# Patient Record
Sex: MALE | Race: WHITE | Employment: OTHER | URBAN - METROPOLITAN AREA
[De-identification: names, ages, dates, MRNs, and addresses within clinical notes are randomized per-mention and may not be internally consistent; named-entity substitution may affect disease eponyms.]

---

## 2017-02-11 PROBLEM — E86.0 DEHYDRATION: Status: ACTIVE | Noted: 2017-02-11

## 2017-02-11 PROBLEM — R53.1 GENERAL WEAKNESS: Status: ACTIVE | Noted: 2017-02-11

## 2017-09-27 PROBLEM — C34.90 LUNG CANCER (HCC): Status: ACTIVE | Noted: 2017-09-27

## 2018-01-16 PROBLEM — I70.245 ATHEROSCLEROSIS OF NATIVE ARTERIES OF LEFT LEG WITH ULCERATION OF OTHER PART OF FOOT (HCC): Status: ACTIVE | Noted: 2018-01-16

## 2018-03-21 ENCOUNTER — HOSPITAL ENCOUNTER (OUTPATIENT)
Dept: INTERVENTIONAL RADIOLOGY/VASCULAR | Age: 83
Discharge: HOME | End: 2018-03-23
Payer: MEDICARE

## 2018-03-21 DIAGNOSIS — I73.9 PERIPHERAL VASCULAR DISEASE (HCC): ICD-10-CM

## 2018-03-21 PROCEDURE — 93923 UPR/LXTR ART STDY 3+ LVLS: CPT

## 2018-03-27 ENCOUNTER — OFFICE VISIT (OUTPATIENT)
Dept: VASCULAR SURGERY | Age: 83
End: 2018-03-27
Payer: MEDICARE

## 2018-03-27 DIAGNOSIS — I70.245 ATHEROSCLEROSIS OF NATIVE ARTERIES OF LEFT LEG WITH ULCERATION OF OTHER PART OF FOOT (HCC): Primary | ICD-10-CM

## 2018-03-27 PROCEDURE — 4040F PNEUMOC VAC/ADMIN/RCVD: CPT | Performed by: SURGERY

## 2018-03-27 PROCEDURE — 1123F ACP DISCUSS/DSCN MKR DOCD: CPT | Performed by: SURGERY

## 2018-03-27 PROCEDURE — G8427 DOCREV CUR MEDS BY ELIG CLIN: HCPCS | Performed by: SURGERY

## 2018-03-27 PROCEDURE — 99214 OFFICE O/P EST MOD 30 MIN: CPT | Performed by: SURGERY

## 2018-03-27 PROCEDURE — G8420 CALC BMI NORM PARAMETERS: HCPCS | Performed by: SURGERY

## 2018-03-27 PROCEDURE — 1036F TOBACCO NON-USER: CPT | Performed by: SURGERY

## 2018-03-27 PROCEDURE — G8599 NO ASA/ANTIPLAT THER USE RNG: HCPCS | Performed by: SURGERY

## 2018-03-27 PROCEDURE — G8484 FLU IMMUNIZE NO ADMIN: HCPCS | Performed by: SURGERY

## 2018-03-27 RX ORDER — CILOSTAZOL 100 MG/1
100 TABLET ORAL 2 TIMES DAILY
Qty: 60 TABLET | Refills: 5 | Status: SHIPPED | OUTPATIENT
Start: 2018-03-27 | End: 2019-01-01

## 2018-03-27 RX ORDER — MIRTAZAPINE 7.5 MG/1
30 TABLET, FILM COATED ORAL NIGHTLY
Status: ON HOLD | COMMUNITY
End: 2019-01-01 | Stop reason: HOSPADM

## 2018-03-27 NOTE — PROGRESS NOTES
Vascular Surgery Outpatient Progress Note      Chief Complaint   Patient presents with    Circulatory Problem     new foot ulcers (R) foot from wearing an air cast, little toe was amputated       HISTORY OF PRESENT ILLNESS:                The patient is a 80 y.o. male who returns for follow-up evaluation of peripheral vascular disease. He had amputation of his fifth toe but developed a new wound on the lateral aspect of his foot. He had repeat arterial studies that suggested significant disease. He denies any fevers or chills. He states 3 out of 10 pain that exacerbates with walking. Past Medical History:        Diagnosis Date    Arthritis     Cancer ([facility] [address]     lung    Carotid artery stenosis     Dementia     HIGH CHOLESTEROL     treated with medication    Hypertension     Parkinson disease ([facility] [address]     Peripheral vascular disease ([facility] [address]      Past Surgical History:        Procedure Laterality Date    BRONCHOSCOPY      CARDIAC SURGERY      triple bypass    COLONOSCOPY  2011    Dr. Kayleen Petty at [address] [place] [address]      rt hip arthroplasty    HERNIA REPAIR      [facility]     Current Medications:   Prior to Admission medications    Medication Sig Start Date End Date Taking?  Authorizing Provider   mirtazapine (REMERON) 7.5 MG tablet Take 7.5 mg by mouth nightly   Yes Historical Provider, MD   cilostazol (PLETAL) 100 MG tablet Take 1 tablet by mouth 2 times daily 3/27/18  Yes Leydi Zuñiga MD   gabapentin (NEURONTIN) 100 MG capsule Take 100 mg by mouth 3 times daily   Yes Historical Provider, MD   acetaminophen (TYLENOL) 325 MG tablet Take 650 mg by mouth every 6 hours as needed for Pain   Yes Historical Provider, MD   Memantine HCl (NAMENDA PO) Take by mouth   Yes Historical Provider, MD   Donepezil HCl (ARICEPT PO) Take by mouth   Yes Historical Provider, MD   propranolol (INDERAL) 20 MG tablet Take 20 mg by mouth 2 times daily   Yes Historical Provider, MD   omeprazole (PRILOSEC) 40 MG delayed release capsule Take 40 mg by mouth daily   Yes Historical Provider, MD   CARBIDOPA PO Take by mouth 12.5/50 one three times per day   Yes Historical Provider, MD   hyoscyamine (LEVBID) 0.375 MG CR tablet Take 0.375 mg by mouth every 12 hours as needed for Cramping   Yes Historical Provider, MD   Vitamin D (CHOLECALCIFEROL) 1000 UNITS CAPS capsule Take 1,000 Units by mouth daily. Yes Historical Provider, MD   digoxin (LANOXIN) 0.25 MG tablet Take 250 mcg by mouth daily. Yes Historical Provider, MD   metoprolol (LOPRESSOR) 50 MG tablet Take 50 mg by mouth 2 times daily. Yes Historical Provider, MD   pravastatin (PRAVACHOL) 40 MG tablet Take 40 mg by mouth daily. Yes Historical Provider, MD   aspirin 81 MG EC tablet Take 1 tablet by mouth daily. 9/16/11  Yes Basilio Otoole MD   doxazosin (CARDURA) 1 MG tablet   Take 2 mg by mouth daily    Yes Historical Provider, MD   lisinopril (PRINIVIL;ZESTRIL) 20 MG tablet Take 20 mg by mouth daily. Yes Historical Provider, MD   Multiple Vitamins-Minerals (TREVOR MULTIVITAMIN FOR MEN PO) Take  by mouth. Yes Historical Provider, MD   cephALEXin (KEFLEX) 500 MG capsule Take 500 mg by mouth 4 times daily    Historical Provider, MD   MECLIZINE HCL PO Take by mouth    Historical Provider, MD   Naproxen Sodium (ALEVE PO) Take by mouth    Historical Provider, MD   warfarin (COUMADIN) 5 MG tablet 5mg daily 9/16/11 9/15/12  Basilio Otoole MD     Allergies:  Fexofenadine-pseudoephed er    Social History     Social History    Marital status:      Spouse name: N/A    Number of children: N/A    Years of education: N/A     Occupational History    Not on file.      Social History Main Topics    Smoking status: Former Smoker     Packs/day: 1.25     Types: Cigarettes     Quit date: 9/6/1985    Smokeless tobacco: Never Used    Alcohol use No    Drug use: No    Sexual activity: Not Currently     Other Topics Concern    Not on file     Social History Narrative    Lives in [facility] with 2 daughters - retired. Family History   Problem Relation Age of Onset    Cancer Mother      [address]  unconfirmed    Cancer Other      sister - skin       REVIEW OF SYSTEMS (New symptoms):    Eyes:      Blurred vision:  No [x]/Yes []               Diplopia:   No [x]/Yes []               Vision loss:       No [x]/Yes []   Ears, nose, throat:             Hearing loss:    No [x]/Yes []      Vertigo:   No [x]/Yes []                       Swallowing problem:  No [x]/Yes []               Nose bleeds:   No [x]/Yes []      Voice hoarseness:  No [x]/Yes []  Respiratory:             Cough:   No [x]/Yes []      Pleuritic chest pain:  No [x]/Yes []                        Dyspnea:   No [x]/Yes []      Wheezing:   No [x]/Yes []  Cardiovascular:             Angina:   No [x]/Yes []      Palpitations:   No [x]/Yes []          Claudication:    No [x]/Yes []      Leg swelling:   No [x]/Yes []  Gastrointestinal:             Nausea or vomiting:  No [x]/Yes []               Abdominal pain:  No [x]/Yes []                     Intestinal bleeding: No [x]/Yes []  Musculoskeletal:             Leg pain:   No []/Yes [x]      Back pain:   No [x]/Yes []                    Weakness:   No [x]/Yes []  Neurologic:             Numbness:   No [x]/Yes []      Paralysis:   No [x]/Yes []                       Headaches:   No [x]/Yes []  Hematologic, lymphatic:   Anemia:   No [x]/Yes []              Bleeding or bruising:  No [x]/Yes []              Fevers or chills: No [x]/Yes []  Endocrine:             Temp intolerance:   No [x]/Yes []                       Polydipsia, polyuria:  No [x]/Yes []  Skin:              Rash:    No [x]/Yes []      Ulcers:   No []/Yes [x]              Abnorm pigment: No [x]/Yes []  :              Frequency/urgency:  No [x]/Yes []      Hematuria:    No [x]/Yes []                      Incontinence:    No [x]/Yes []    PHYSICAL EXAM:  There were no vitals filed for this visit. General Appearance: alert and oriented to person, place and time, well developed and well- nourished, in no acute distress  Skin: warm and dry, no rash or erythema  Head: normocephalic and atraumatic  Eyes: extraocular eye movements intact, conjunctivae normal  ENT: external ear and ear canal normal bilaterally, nose without deformity  Pulmonary/Chest: clear to auscultation bilaterally- no wheezes, rales or rhonchi, normal air movement, no respiratory distress  Cardiovascular: normal rate, regular rhythm, normal S1 and S2, no murmurs, no carotid bruits  Abdomen: soft, non-tender, non-distended, normal bowel sounds, no masses or organomegaly  Musculoskeletal: normal range of motion, no joint swelling, deformity or tenderness  Neurologic: no cranial nerve deficit, gait, coordination and speech normal  Extremities: no leg edema bilaterally    PULSE EXAM      Right      Left   Brachial     Radial     Femoral     Popliteal     Dorsalis Pedis     Posterior Tibial     (3=normal, 2=diminished, 1=barely palpable, 4=widened)    RADIOLOGY:     IMPRESSION/RECOMMENDATIONS:      Problem List Items Addressed This Visit     Atherosclerosis of native arteries of left leg with ulceration of other part of foot ([address] - Primary          I reviewed with the patient and his daughter that I do recommend proceeding with an arteriogram to better evaluate the circulation and potentially perform intervention. They understand and agree. The daughter also mentioned that he was on Pletal previously but it was stopped when he was experiencing gastritis. She states that he was taking significant ibuprofen during the time for back pain and spinal stenosis and the gastritis resolved after he stopped taking ibuprofen. I will restart the Pletal and asked him to notify me of any side effects. No Follow-up on file.

## 2018-03-28 PROBLEM — I70.235 ATHEROSCLEROSIS OF NATIVE ARTERIES OF RIGHT LEG WITH ULCERATION OF OTHER PART OF FOOT (HCC): Chronic | Status: ACTIVE | Noted: 2018-03-28

## 2018-04-04 ENCOUNTER — HOSPITAL ENCOUNTER (OUTPATIENT)
Dept: CARDIAC CATH/INVASIVE PROCEDURES | Age: 83
Discharge: HOME | End: 2018-04-04
Payer: MEDICARE

## 2018-04-04 ENCOUNTER — TELEPHONE (OUTPATIENT)
Dept: VASCULAR SURGERY | Age: 83
End: 2018-04-04

## 2018-04-04 VITALS
RESPIRATION RATE: 16 BRPM | TEMPERATURE: 98.5 F | DIASTOLIC BLOOD PRESSURE: 75 MMHG | HEART RATE: 87 BPM | BODY MASS INDEX: 20 KG/M2 | HEIGHT: 68 IN | WEIGHT: 132 LBS | SYSTOLIC BLOOD PRESSURE: 148 MMHG

## 2018-04-04 DIAGNOSIS — I70.235 ATHEROSCLEROSIS OF NATIVE ARTERIES OF RIGHT LEG WITH ULCERATION OF OTHER PART OF FOOT (HCC): Chronic | ICD-10-CM

## 2018-04-04 LAB
ABO/RH: NORMAL
ANION GAP SERPL CALCULATED.3IONS-SCNC: 12 MMOL/L (ref 7–16)
ANTIBODY SCREEN: NORMAL
BUN BLDV-MCNC: 27 MG/DL (ref 8–23)
CALCIUM SERPL-MCNC: 9.7 MG/DL (ref 8.6–10.2)
CHLORIDE BLD-SCNC: 100 MMOL/L (ref 98–107)
CO2: 30 MMOL/L (ref 22–29)
CREAT SERPL-MCNC: 1.5 MG/DL (ref 0.7–1.2)
GFR AFRICAN AMERICAN: 54
GFR NON-AFRICAN AMERICAN: 45 ML/MIN/1.73
GLUCOSE BLD-MCNC: 102 MG/DL (ref 74–109)
HCT VFR BLD CALC: 29.1 % (ref 37–54)
HEMOGLOBIN: 9.6 G/DL (ref 12.5–16.5)
MCH RBC QN AUTO: 33.3 PG (ref 26–35)
MCHC RBC AUTO-ENTMCNC: 33 % (ref 32–34.5)
MCV RBC AUTO: 101 FL (ref 80–99.9)
PDW BLD-RTO: 13.6 FL (ref 11.5–15)
PLATELET # BLD: 163 E9/L (ref 130–450)
PMV BLD AUTO: 10.3 FL (ref 7–12)
POTASSIUM SERPL-SCNC: 4.5 MMOL/L (ref 3.5–5)
RBC # BLD: 2.88 E12/L (ref 3.8–5.8)
SODIUM BLD-SCNC: 142 MMOL/L (ref 132–146)
WBC # BLD: 6.7 E9/L (ref 4.5–11.5)

## 2018-04-04 PROCEDURE — C1725 CATH, TRANSLUMIN NON-LASER: HCPCS

## 2018-04-04 PROCEDURE — 86900 BLOOD TYPING SEROLOGIC ABO: CPT

## 2018-04-04 PROCEDURE — 2500000003 HC RX 250 WO HCPCS

## 2018-04-04 PROCEDURE — C1769 GUIDE WIRE: HCPCS

## 2018-04-04 PROCEDURE — 6360000002 HC RX W HCPCS

## 2018-04-04 PROCEDURE — C1894 INTRO/SHEATH, NON-LASER: HCPCS

## 2018-04-04 PROCEDURE — C1760 CLOSURE DEV, VASC: HCPCS

## 2018-04-04 PROCEDURE — 75710 ARTERY X-RAYS ARM/LEG: CPT | Performed by: SURGERY

## 2018-04-04 PROCEDURE — G0269 OCCLUSIVE DEVICE IN VEIN ART: HCPCS | Performed by: SURGERY

## 2018-04-04 PROCEDURE — 80048 BASIC METABOLIC PNL TOTAL CA: CPT

## 2018-04-04 PROCEDURE — 86850 RBC ANTIBODY SCREEN: CPT

## 2018-04-04 PROCEDURE — 86901 BLOOD TYPING SEROLOGIC RH(D): CPT

## 2018-04-04 PROCEDURE — 85027 COMPLETE CBC AUTOMATED: CPT

## 2018-04-04 PROCEDURE — 36415 COLL VENOUS BLD VENIPUNCTURE: CPT

## 2018-04-04 PROCEDURE — C1887 CATHETER, GUIDING: HCPCS

## 2018-04-04 PROCEDURE — 36247 INS CATH ABD/L-EXT ART 3RD: CPT | Performed by: SURGERY

## 2018-04-04 PROCEDURE — 2709999900 HC NON-CHARGEABLE SUPPLY

## 2018-04-04 RX ORDER — ONDANSETRON 2 MG/ML
4 INJECTION INTRAMUSCULAR; INTRAVENOUS EVERY 8 HOURS PRN
Status: DISCONTINUED | OUTPATIENT
Start: 2018-04-04 | End: 2018-04-05 | Stop reason: HOSPADM

## 2018-04-04 RX ORDER — SODIUM CHLORIDE 9 MG/ML
INJECTION, SOLUTION INTRAVENOUS CONTINUOUS
Status: DISCONTINUED | OUTPATIENT
Start: 2018-04-04 | End: 2018-04-05 | Stop reason: HOSPADM

## 2018-04-04 RX ORDER — ACETAMINOPHEN 325 MG/1
650 TABLET ORAL EVERY 4 HOURS PRN
Status: DISCONTINUED | OUTPATIENT
Start: 2018-04-04 | End: 2018-04-05 | Stop reason: HOSPADM

## 2018-04-04 RX ORDER — SODIUM CHLORIDE 0.9 % (FLUSH) 0.9 %
10 SYRINGE (ML) INJECTION PRN
Status: DISCONTINUED | OUTPATIENT
Start: 2018-04-04 | End: 2018-04-05 | Stop reason: HOSPADM

## 2018-04-09 ENCOUNTER — HOSPITAL ENCOUNTER (OUTPATIENT)
Dept: CT IMAGING | Age: 83
Discharge: HOME | End: 2018-04-11
Payer: MEDICARE

## 2018-04-09 DIAGNOSIS — C34.11 SQUAMOUS CELL CARCINOMA OF BRONCHUS IN RIGHT UPPER LOBE (HCC): ICD-10-CM

## 2018-04-09 PROCEDURE — 2580000003 HC RX 258: Performed by: RADIOLOGY

## 2018-04-09 PROCEDURE — 71260 CT THORAX DX C+: CPT

## 2018-04-09 PROCEDURE — 6360000004 HC RX CONTRAST MEDICATION: Performed by: RADIOLOGY

## 2018-04-09 RX ORDER — SODIUM CHLORIDE 0.9 % (FLUSH) 0.9 %
10 SYRINGE (ML) INJECTION ONCE
Status: COMPLETED | OUTPATIENT
Start: 2018-04-09 | End: 2018-04-09

## 2018-04-09 RX ADMIN — Medication 10 ML: at 07:44

## 2018-04-09 RX ADMIN — IOPAMIDOL 90 ML: 755 INJECTION, SOLUTION INTRAVENOUS at 07:44

## 2018-04-16 ENCOUNTER — HOSPITAL ENCOUNTER (OUTPATIENT)
Dept: WOUND CARE | Age: 83
Discharge: HOME | End: 2018-04-16
Payer: MEDICARE

## 2018-04-16 VITALS
TEMPERATURE: 97.6 F | HEART RATE: 72 BPM | RESPIRATION RATE: 16 BRPM | DIASTOLIC BLOOD PRESSURE: 70 MMHG | SYSTOLIC BLOOD PRESSURE: 120 MMHG | WEIGHT: 138 LBS | BODY MASS INDEX: 20.92 KG/M2 | HEIGHT: 68 IN

## 2018-04-16 PROCEDURE — 11042 DBRDMT SUBQ TIS 1ST 20SQCM/<: CPT

## 2018-04-16 PROCEDURE — 99213 OFFICE O/P EST LOW 20 MIN: CPT

## 2018-04-23 ENCOUNTER — HOSPITAL ENCOUNTER (OUTPATIENT)
Dept: WOUND CARE | Age: 83
Discharge: HOME | End: 2018-04-23
Payer: MEDICARE

## 2018-04-23 VITALS
HEIGHT: 68 IN | SYSTOLIC BLOOD PRESSURE: 114 MMHG | DIASTOLIC BLOOD PRESSURE: 60 MMHG | WEIGHT: 138 LBS | RESPIRATION RATE: 16 BRPM | TEMPERATURE: 98.4 F | BODY MASS INDEX: 20.92 KG/M2 | HEART RATE: 76 BPM

## 2018-04-23 PROCEDURE — 99213 OFFICE O/P EST LOW 20 MIN: CPT

## 2018-04-26 ENCOUNTER — HOSPITAL ENCOUNTER (OUTPATIENT)
Dept: RADIATION ONCOLOGY | Age: 83
Discharge: HOME | End: 2018-04-26
Payer: MEDICARE

## 2018-04-26 VITALS
TEMPERATURE: 97.8 F | WEIGHT: 132.2 LBS | HEART RATE: 104 BPM | DIASTOLIC BLOOD PRESSURE: 62 MMHG | RESPIRATION RATE: 20 BRPM | BODY MASS INDEX: 20.1 KG/M2 | SYSTOLIC BLOOD PRESSURE: 162 MMHG

## 2018-04-26 DIAGNOSIS — C80.1 CANCER (HCC): Primary | ICD-10-CM

## 2018-04-26 PROCEDURE — 99212 OFFICE O/P EST SF 10 MIN: CPT

## 2018-04-26 PROCEDURE — 99213 OFFICE O/P EST LOW 20 MIN: CPT | Performed by: RADIOLOGY

## 2018-05-07 ENCOUNTER — HOSPITAL ENCOUNTER (OUTPATIENT)
Dept: WOUND CARE | Age: 83
Discharge: HOME | End: 2018-05-07
Payer: MEDICARE

## 2018-05-07 VITALS
HEART RATE: 80 BPM | WEIGHT: 138 LBS | DIASTOLIC BLOOD PRESSURE: 70 MMHG | BODY MASS INDEX: 20.92 KG/M2 | RESPIRATION RATE: 20 BRPM | HEIGHT: 68 IN | TEMPERATURE: 98 F | SYSTOLIC BLOOD PRESSURE: 138 MMHG

## 2018-05-07 PROCEDURE — 11042 DBRDMT SUBQ TIS 1ST 20SQCM/<: CPT

## 2018-05-07 RX ORDER — CLOPIDOGREL BISULFATE 75 MG/1
75 TABLET ORAL DAILY
COMMUNITY

## 2018-05-14 ENCOUNTER — HOSPITAL ENCOUNTER (OUTPATIENT)
Dept: WOUND CARE | Age: 83
Discharge: HOME | End: 2018-05-14
Payer: MEDICARE

## 2018-05-14 VITALS
HEART RATE: 60 BPM | HEIGHT: 68 IN | RESPIRATION RATE: 20 BRPM | SYSTOLIC BLOOD PRESSURE: 126 MMHG | DIASTOLIC BLOOD PRESSURE: 68 MMHG | TEMPERATURE: 98.5 F | BODY MASS INDEX: 20 KG/M2 | WEIGHT: 132 LBS

## 2018-05-14 PROCEDURE — 99213 OFFICE O/P EST LOW 20 MIN: CPT

## 2018-06-18 ENCOUNTER — HOSPITAL ENCOUNTER (OUTPATIENT)
Dept: WOUND CARE | Age: 83
Discharge: HOME | End: 2018-06-18

## 2018-07-24 ENCOUNTER — HOSPITAL ENCOUNTER (OUTPATIENT)
Dept: RADIATION ONCOLOGY | Age: 83
Discharge: HOME | End: 2018-07-24
Payer: MEDICARE

## 2018-07-24 ENCOUNTER — TELEPHONE (OUTPATIENT)
Dept: RADIATION ONCOLOGY | Age: 83
End: 2018-07-24

## 2018-07-24 VITALS
SYSTOLIC BLOOD PRESSURE: 126 MMHG | DIASTOLIC BLOOD PRESSURE: 60 MMHG | BODY MASS INDEX: 20.28 KG/M2 | WEIGHT: 133.38 LBS

## 2018-07-24 DIAGNOSIS — C80.1 CANCER (HCC): Primary | ICD-10-CM

## 2018-07-24 PROCEDURE — 99213 OFFICE O/P EST LOW 20 MIN: CPT | Performed by: NURSE PRACTITIONER

## 2018-07-24 NOTE — PROGRESS NOTES
Radiation Oncology   3 Month Follow Up Note        7/30/2018    Baker Cabot  Vitals:    07/24/18 1610   BP: 126/60     Wt Readings from Last 1 Encounters:   07/24/18 133 lb 6 oz (60.5 kg)         Yessy Cotto MD,      CC: Patient is here for 3 month follow up for post-radiation completion. HPI:  Baker Cabot is a pleasant 80 y.o. male with a diagnosis of locally advanced right lung s/p definitive RT completed 11/08/2017. Seen today in 3 month follow-up visit. He is accompanied by both his daughters at today's visit. Patient denies skin complaints, pleuritic pain, productive or dry cough, shortness of breath, ART or orthopnea. Patient uses CPAP at night. Per Patient and daughter's report patient is is weak, using wheelchair at today's visit but uses walker at home. They report patient losing weight and has decreased appetite. Daughter reports patient with limited activity, sits in recliner throughout most of the day. Patient is able to ambulate short distances utilizing walker. Patient is following with Dr. Ben You for medical oncology. Past Medical History:   Diagnosis Date    Arthritis     Atherosclerosis of native arteries of right leg with ulceration of other part of foot ([facility] [address] 3/28/2018    Cancer (HCC)     lung    Carotid artery stenosis     Dementia     HIGH CHOLESTEROL     treated with medication    Hypertension     Parkinson disease ([facility] [address]     Peripheral vascular disease ([facility] [address]        Past Surgical History:   Procedure Laterality Date    BRONCHOSCOPY      CARDIAC CATHETERIZATION  04/04/2018    Left transfemoral right superficial femoral artery catheterization, right lower extremity runoff  Len Jasmine MD   [address]      triple bypass    COLONOSCOPY  2011    Dr. Kiley Gallardo at [address] [place] [address]    HIP FRACTURE SURGERY Right 09/06/2011    Right hip hemiarthroplasty RAJAT Zuniga MD       Social History     Social History doxazosin (CARDURA) 1 MG tablet   Take 2 mg by mouth daily       lisinopril (PRINIVIL;ZESTRIL) 20 MG tablet Take 10 mg by mouth daily       Multiple Vitamins-Minerals (TREVOR MULTIVITAMIN FOR MEN PO) Take  by mouth. No current facility-administered medications for this encounter. REVIEW OF SYSTEMS:    Constitutional:  No fever, chills or rigors. Eyes: + wears glasses. No changes in vision, discharge, or pain  ENT: + hearing impaired. No headaches or vertigo. No mouth sores or sore throat. No change in taste or smell. Cardiovascular: No chest discomfort, dyspnea on exertion, palpitations or loss of consciousness. Respiratory: + CPAP @ HS. No productive or dry coughing, wheezing, shortness of breath. No ART, orthopnea or pleuritic pain. Gastrointestinal: No abdominal pain, appetite loss, blood in stools. No change in bowel habits. No hematemesis   Genitourinary: No dysuria, trouble voiding, or hematuria. No nocturia or increased frequency. Musculoskeletal: + Ambulates with walker. + weakness. No joint swelling. Integumentary: No rash or pruritis. Neurological: No headache or diplopia. No change in mood, affect, memory, mentation, behavior. Psychiatric: No anxiety, or depression. Endocrine: No temperature intolerance. No excessive thirst, fluid intake, or urination. No tremor. Hematologic/Lymphatic: No abnormal bruising or bleeding, blood clots or swollen lymph nodes. Allergic/Immunologic: No nasal congestion or hives. PHYSICAL EXAMINATION:   Vitals:    07/24/18 1610   BP: 126/60   Site: Right Arm   Position: Sitting   Cuff Size: Medium Adult   Weight: 133 lb 6 oz (60.5 kg)     Body mass index is 20.28 kg/m². Constitutional: A well developed, thin, elderly 80 y.o. male. Alert, orientated and cooperative. Head: normocephalic and atraumatic. EENT: EOMI NARGIS. MMM. External canals are patent and no discharge was appreciated. Septum was midline.  Oral mucosa pink and moist.

## 2018-08-06 ENCOUNTER — HOSPITAL ENCOUNTER (OUTPATIENT)
Dept: CT IMAGING | Age: 83
Discharge: HOME | End: 2018-08-08
Payer: MEDICARE

## 2018-08-06 DIAGNOSIS — C34.11 CANCER OF BRONCHUS OF RIGHT UPPER LOBE (HCC): ICD-10-CM

## 2018-08-06 PROCEDURE — 71260 CT THORAX DX C+: CPT

## 2018-08-06 PROCEDURE — 6360000004 HC RX CONTRAST MEDICATION: Performed by: RADIOLOGY

## 2018-08-06 PROCEDURE — 74177 CT ABD & PELVIS W/CONTRAST: CPT

## 2018-08-06 RX ORDER — SODIUM CHLORIDE 0.9 % (FLUSH) 0.9 %
10 SYRINGE (ML) INJECTION
Status: ACTIVE | OUTPATIENT
Start: 2018-08-06 | End: 2018-08-06

## 2018-08-06 RX ADMIN — IOPAMIDOL 110 ML: 755 INJECTION, SOLUTION INTRAVENOUS at 07:20

## 2018-08-06 RX ADMIN — IOHEXOL 50 ML: 240 INJECTION, SOLUTION INTRATHECAL; INTRAVASCULAR; INTRAVENOUS; ORAL at 07:19

## 2018-08-27 ENCOUNTER — HOSPITAL ENCOUNTER (OUTPATIENT)
Dept: WOUND CARE | Age: 83
Discharge: HOME | End: 2018-08-27
Payer: MEDICARE

## 2018-08-27 VITALS
BODY MASS INDEX: 20 KG/M2 | HEIGHT: 68 IN | SYSTOLIC BLOOD PRESSURE: 110 MMHG | HEART RATE: 76 BPM | DIASTOLIC BLOOD PRESSURE: 60 MMHG | TEMPERATURE: 98.5 F | RESPIRATION RATE: 18 BRPM | WEIGHT: 132 LBS

## 2018-08-27 PROCEDURE — 99213 OFFICE O/P EST LOW 20 MIN: CPT

## 2018-08-27 NOTE — PROGRESS NOTES
Wound Healing Center Followup Visit Note    Referring Physician : Shelia Mcintosh MD  [address] [facility] RECORD NUMBER:  [id number]  AGE: 80 y.o. GENDER: male  : 1933  EPISODE DATE:  2018    Subjective:     Chief Complaint   Patient presents with    Wound Check     RT FOOT       HISTORY of PRESENT ILLNESS JANAE Giang is a 80 y.o. male who presents today for wound/ulcer evaluation. History of Wound Context:  Follow up with debridement     Wound/Ulcer Pain Timing/Severity: none  Quality of pain: N/A  Severity:  0 / 10   Modifying Factors: None  Associated Signs/Symptoms: none    Ulcer Identification:  Ulcer Type: non-healing surgical  Contributing Factors: arterial insufficiency    Diabetic/Pressure/Non Pressure Ulcers only:  Ulcer: Non-Pressure ulcer, limited to breakdown of skin    Wound: Surgical incision        PAST MEDICAL HISTORY      Diagnosis Date    Arthritis     Atherosclerosis of native arteries of right leg with ulceration of other part of foot ([address] 3/28/2018    Cancer (HCC)     lung    Carotid artery stenosis     Dementia     HIGH CHOLESTEROL     treated with medication    Hypertension     Parkinson disease ([address]     Peripheral vascular disease ([address]      Past Surgical History:   Procedure Laterality Date    BRONCHOSCOPY      CARDIAC CATHETERIZATION  2018    Left transfemoral right superficial femoral artery catheterization, right lower extremity runoff  Marcene Osler, MD   [facility] CARDIAC SURGERY      triple bypass    COLONOSCOPY      Dr. Verla Holstein at [address] [facility]    HIP FRACTURE SURGERY Right 2011    Right hip hemiarthroplasty RAJAT Jolley MD     Family History   Problem Relation Age of Onset    Cancer Mother         [address]  unconfirmed    Cancer Other         sister - skin     Social History   Substance Use Topics    Smoking status: Former Smoker     Packs/day: 1.25     Types: Cigarettes     Quit date: 9/6/1985    Smokeless tobacco: Never Used    Alcohol use No      Comment: remote Hx of moderate alcohol use, quit 1985     Allergies   Allergen Reactions    Fexofenadine-Pseudoephed Er      Current Outpatient Prescriptions on File Prior to Encounter   Medication Sig Dispense Refill    clopidogrel (PLAVIX) 75 MG tablet Take 75 mg by mouth daily      collagenase 250 UNIT/GM ointment Apply 1 g topically daily Apply topically daily.  mirtazapine (REMERON) 7.5 MG tablet Take 7.5 mg by mouth nightly      cilostazol (PLETAL) 100 MG tablet Take 1 tablet by mouth 2 times daily 60 tablet 5    gabapentin (NEURONTIN) 100 MG capsule Take 300 mg by mouth nightly. Jayna Goes acetaminophen (TYLENOL) 325 MG tablet Take 650 mg by mouth every 6 hours as needed for Pain      Memantine HCl (NAMENDA PO) Take 5 mg by mouth 2 times daily       Donepezil HCl (ARICEPT PO) Take by mouth      propranolol (INDERAL) 20 MG tablet Take 20 mg by mouth 2 times daily      CARBIDOPA PO Take  mg by mouth three times daily       hyoscyamine (LEVBID) 0.375 MG CR tablet Take 0.375 mg by mouth every 12 hours       Vitamin D (CHOLECALCIFEROL) 1000 UNITS CAPS capsule Take 2,000 Units by mouth daily       digoxin (LANOXIN) 0.25 MG tablet Take 250 mcg by mouth daily.  pravastatin (PRAVACHOL) 40 MG tablet Take 40 mg by mouth daily.  aspirin 81 MG EC tablet Take 1 tablet by mouth daily. 30 tablet 0    doxazosin (CARDURA) 1 MG tablet   Take 2 mg by mouth daily       lisinopril (PRINIVIL;ZESTRIL) 20 MG tablet Take 10 mg by mouth daily       Multiple Vitamins-Minerals (TREVOR MULTIVITAMIN FOR MEN PO) Take  by mouth. No current facility-administered medications on file prior to encounter.         REVIEW OF SYSTEMS See HPI    Objective:    /60   Pulse 76   Temp 98.5 °F (36.9 °C) (Oral)   Resp 18   Ht 5' 8\" (1.727 m)   Wt 132 lb (59.9 kg)   BMI 20.07 kg/m²   Wt Readings from Last 3 Encounters:   08/27/18 132 lb (59.9 kg)   07/24/18 133 lb 6 oz (60.5 kg)   05/14/18 132 lb (59.9 kg)     PHYSICAL EXAM  CONSTITUTIONAL:   Awake, alert, cooperative   EYES:  lids and lashes normal   ENT: external ears and nose without lesions   NECK:  supple, symmetrical, trachea midline   SKIN:  Open wound Present    Assessment:     Problem List Items Addressed This Visit     None        Procedure Note  Indications:  Based on my examination of this patient's wound(s)/ulcer(s) today, debridement is not required to promote healing and evaluate the wound base. Performed by: Bill Aguilar DPM    Consent obtained:  Yes    Time out taken:  Yes    Pain Control: Anesthetic  Anesthetic: 2% Lidocaine Gel Topical     Debridement:Other (comment)  Pre Debridement Measurements:  Are located in the [place]  Documentation Flow Sheet    Wound/Ulcer #: 1    Post Debridement Measurements:  Wound/Ulcer Descriptions are Pre Debridement except measurements:    Wound 09/05/11 Abrasion(s) Elbow (Active)   Number of days: 9146       Wound 09/05/11 Other (Comment) Hip surgical incision (Active)   Number of days: 2824       Wound 09/05/11 Other (Comment)  right hip surgical incision (Active)   Number of days: 0430       Wound 04/16/18 Arterial ulcer Foot Right;Lateral wound #2 right lateral foot, acquired 2-1-18 (Active)   Wound Image   5/14/2018  3:23 PM   Wound Type Wound 4/16/2018  3:03 PM   Wound Arterial 4/16/2018  3:03 PM   Dressing Status Clean;Dry; Intact 5/14/2018  4:09 PM   Dressing Changed Changed/New 5/14/2018  4:09 PM   Dressing/Treatment Dry dressing;Silicone dressing 0/47/8216  4:09 PM   Wound Cleansed Rinsed/Irrigated with saline 5/14/2018  4:09 PM   Wound Length (cm) 1.5 cm 5/14/2018  3:23 PM   Wound Width (cm) 2.3 cm 5/14/2018  3:23 PM   Wound Depth (cm)  0.2 5/14/2018  3:23 PM   Calculated Wound Size (cm^2) (l*w) 3.45 cm^2 5/14/2018  3:23 PM   Change in Wound Size % (l*w) -15 5/14/2018  3:23 PM   Wound Assessment Yellow;Pink 5/14/2018  3:23 PM IF OKAY WITH PRIMARY CARE. [address] [facility] followup visit _________2WEEKS____________________  (Please note your next appointment above and if you are unable to keep, kindly give a 24 hour notice. Thank you.)    Physician signature:__________________________      If you experience any of the following, please call the [facility] during business hours:    * Increase in Pain  * Temperature over 101  * Increase in drainage from your wound  * Drainage with a foul odor  * Bleeding  * Increase in swelling  * Need for compression bandage changes due to slippage, breakthrough drainage. If you need medical attention outside of the business hours of the [facility] please contact your PCP or go to the nearest emergency room.         Electronically signed by Dino Meyer DPM on 8/27/2018 at 2:15 PM

## 2018-08-27 NOTE — PLAN OF CARE
Problem: Wound:  Goal: Will show signs of wound healing; wound closure and no evidence of infection  Will show signs of wound healing; wound closure and no evidence of infection   Outcome: Ongoing      Problem: Arterial:  Goal: Optimize blood flow for wound healing  Optimize blood flow for wound healing   Outcome: Ongoing      Problem: Falls - Risk of:  Goal: Will remain free from falls  Will remain free from falls   Outcome: Ongoing

## 2018-09-10 ENCOUNTER — HOSPITAL ENCOUNTER (OUTPATIENT)
Dept: WOUND CARE | Age: 83
Discharge: HOME | End: 2018-09-10
Payer: MEDICARE

## 2018-09-17 ENCOUNTER — HOSPITAL ENCOUNTER (OUTPATIENT)
Dept: WOUND CARE | Age: 83
Discharge: HOME | End: 2018-09-17
Payer: MEDICARE

## 2018-09-17 VITALS
DIASTOLIC BLOOD PRESSURE: 80 MMHG | TEMPERATURE: 98.2 F | HEIGHT: 68 IN | WEIGHT: 132 LBS | SYSTOLIC BLOOD PRESSURE: 140 MMHG | HEART RATE: 80 BPM | BODY MASS INDEX: 20 KG/M2 | RESPIRATION RATE: 18 BRPM

## 2018-09-17 PROCEDURE — 99212 OFFICE O/P EST SF 10 MIN: CPT

## 2018-09-17 NOTE — PLAN OF CARE
Problem: Wound:  Goal: Will show signs of wound healing; wound closure and no evidence of infection  Will show signs of wound healing; wound closure and no evidence of infection   Outcome: Completed Date Met: 09/17/18      Problem: Arterial:  Goal: Optimize blood flow for wound healing  Optimize blood flow for wound healing   Outcome: Completed Date Met: 09/17/18      Problem: Falls - Risk of:  Goal: Will remain free from falls  Will remain free from falls   Outcome: Completed Date Met: 09/17/18

## 2018-09-17 NOTE — PROGRESS NOTES
procedural Pain 0 4/16/2018  3:19 PM   Number of days: 154       Wound 04/16/18 Arterial ulcer Ankle Right;Posterior wound #3 right posterior ankle, acquired 2-1-18 (Active)   Wound Image   5/14/2018  3:23 PM   Wound Type Wound 4/16/2018  3:03 PM   Wound Arterial 4/16/2018  3:03 PM   Dressing Status Clean;Dry; Intact 5/7/2018  3:26 PM   Dressing Changed Changed/New 5/7/2018  3:26 PM   Dressing/Treatment Moist to dry 5/7/2018  3:26 PM   Wound Cleansed Rinsed/Irrigated with saline 5/7/2018  3:26 PM   Wound Length (cm) 0 cm 5/14/2018  3:23 PM   Wound Width (cm) 0 cm 5/14/2018  3:23 PM   Wound Depth (cm)  0 5/14/2018  3:23 PM   Calculated Wound Size (cm^2) (l*w) 0 cm^2 5/14/2018  3:23 PM   Change in Wound Size % (l*w) 100 5/14/2018  3:23 PM   Wound Assessment Red 5/7/2018  2:34 PM   Drainage Amount None 5/7/2018  2:34 PM   Odor None 5/7/2018  2:34 PM   Zakia-wound Assessment Red 5/7/2018  2:34 PM   Time out No 5/7/2018  3:13 PM   Procedural Pain 1 4/16/2018  3:19 PM   Post procedural Pain 0 4/16/2018  3:19 PM   Number of days: 154       Wound 04/16/18 Arterial ulcer Foot Right;Medial wound #4 right medial foot, acquired 2-15-18 (Active)   Wound Image   5/14/2018  3:23 PM   Wound Type Wound 4/16/2018  3:03 PM   Wound Arterial 4/16/2018  3:03 PM   Dressing Status Clean;Dry; Intact 5/7/2018  3:26 PM   Dressing Changed Changed/New 5/7/2018  3:26 PM   Dressing/Treatment Moist to dry 5/7/2018  3:26 PM   Wound Cleansed Rinsed/Irrigated with saline 5/7/2018  3:26 PM   Wound Length (cm) 0.3 cm 5/14/2018  3:23 PM   Wound Width (cm) 0.3 cm 5/14/2018  3:23 PM   Wound Depth (cm)  0.1 5/14/2018  3:23 PM   Calculated Wound Size (cm^2) (l*w) 0.09 cm^2 5/14/2018  3:23 PM   Change in Wound Size % (l*w) 64 5/14/2018  3:23 PM   Wound Assessment Pink 5/14/2018  3:23 PM   Drainage Amount None 5/14/2018  3:23 PM   Odor None 5/14/2018  3:23 PM   Zakia-wound Assessment Pink; Intact 5/14/2018  3:23 PM   Time out No 5/14/2018  3:58 PM   Procedural Pain 1 4/16/2018  3:19 PM   Post procedural Pain 0 4/16/2018  3:19 PM   Number of days: 154       Wound 08/27/18 Other (Comment) Foot Lateral;Right #5 ACQUIRED  (Active)   Wound Image   9/17/2018  3:33 PM   Wound Type Wound 8/27/2018  1:49 PM   Dressing Changed Changed/New 8/27/2018  2:52 PM   Dressing/Treatment Dry dressing 8/27/2018  2:52 PM   Wound Cleansed Rinsed/Irrigated with saline 8/27/2018  2:52 PM   Wound Length (cm) 1 cm 8/27/2018  1:49 PM   Wound Width (cm) 0.3 cm 8/27/2018  1:49 PM   Wound Depth (cm)  0.1 8/27/2018  1:49 PM   Calculated Wound Size (cm^2) (l*w) 0.3 cm^2 8/27/2018  1:49 PM   Wound Assessment Red 8/27/2018  1:49 PM   Drainage Amount Small 8/27/2018  1:49 PM   Drainage Description Serous 8/27/2018  1:49 PM   Odor None 8/27/2018  1:49 PM   Zakia-wound Assessment Pink;Purple 8/27/2018  1:49 PM   Debridement per physician None 9/17/2018  3:33 PM   Number of days: 21       Incision 09/06/11 Hip Right (Active)   Number of days: 2568     Percent of Wound/Ulcer Debrided: 0%    Total Surface Area Debrided:  0 sq cm     Estimated Blood Loss:  None  Hemostasis Achieved:  not needed    Procedural Pain:  0  / 10   Post Procedural Pain:  0 / 10     Response to treatment:  Well tolerated by patient. Plan:   Treatment Note please see attached Discharge Instructions    Written patient dismissal instructions given to patient and signed by patient or POA. Patient discharged from [facility] with healed right foot wound.         Discharge Instructions       Visit Discharge/Physician Orders     Discharge condition: Stable     Assessment of pain at discharge:NONE     Anesthetic used: NONE     Discharge to: Home     Left via:Private automobile     Accompanied by: accompanied by sibling     ECF/HHA:      Dressing Orders:RIGHT LATERAL FOOT ULCER- healed     Treatment Orders:  ELEVATE LOWER LEGS AS TOLERATED     EAT FOOD HIGH IN PROTEIN AND VITAMIN C     TAKE MULTIVITAMIN DAILY IF OKAY WITH PRIMARY CARE.     [facility] followup visit _________as needed__________________  (Please note your next appointment above and if you are unable to keep, kindly give a 24 hour notice. Thank you.)     Physician signature:__________________________        If you experience any of the following, please call the [facility] during business hours:     * Increase in Pain  * Temperature over 101  * Increase in drainage from your wound  * Drainage with a foul odor  * Bleeding  * Increase in swelling  * Need for compression bandage changes due to slippage, breakthrough drainage.     If you need medical attention outside of the business hours of the [facility] please contact your PCP or go to the nearest emergency room.         Electronically signed by Antonietta Barrera DPM on 9/17/2018 at 3:38 PM

## 2018-09-26 PROBLEM — E86.0 DEHYDRATION: Status: RESOLVED | Noted: 2017-02-11 | Resolved: 2018-01-01

## 2019-01-01 ENCOUNTER — ANESTHESIA EVENT (OUTPATIENT)
Dept: ENDOSCOPY | Age: 84
DRG: 871 | End: 2019-01-01
Payer: MEDICARE

## 2019-01-01 ENCOUNTER — HOSPITAL ENCOUNTER (OUTPATIENT)
Dept: INFUSION THERAPY | Age: 84
Discharge: HOME | End: 2019-05-08
Payer: MEDICARE

## 2019-01-01 ENCOUNTER — APPOINTMENT (OUTPATIENT)
Dept: GENERAL RADIOLOGY | Age: 84
DRG: 870 | End: 2019-01-01
Payer: MEDICARE

## 2019-01-01 ENCOUNTER — HOSPITAL ENCOUNTER (OUTPATIENT)
Dept: CT IMAGING | Age: 84
Discharge: HOME | End: 2019-02-05
Payer: MEDICARE

## 2019-01-01 ENCOUNTER — HOSPITAL ENCOUNTER (OUTPATIENT)
Dept: PET IMAGING | Age: 84
Discharge: HOME | End: 2019-02-18
Payer: MEDICARE

## 2019-01-01 ENCOUNTER — APPOINTMENT (OUTPATIENT)
Dept: CT IMAGING | Age: 84
DRG: 871 | End: 2019-01-01
Payer: MEDICARE

## 2019-01-01 ENCOUNTER — APPOINTMENT (OUTPATIENT)
Dept: GENERAL RADIOLOGY | Age: 84
DRG: 871 | End: 2019-01-01
Payer: MEDICARE

## 2019-01-01 ENCOUNTER — HOSPITAL ENCOUNTER (OUTPATIENT)
Dept: INFUSION THERAPY | Age: 84
Discharge: HOME | End: 2019-04-29
Payer: MEDICARE

## 2019-01-01 ENCOUNTER — APPOINTMENT (OUTPATIENT)
Dept: CT IMAGING | Age: 84
End: 2019-01-01
Payer: MEDICARE

## 2019-01-01 ENCOUNTER — HOSPITAL ENCOUNTER (OUTPATIENT)
Dept: RADIATION ONCOLOGY | Age: 84
Discharge: HOME | End: 2019-04-16
Attending: RADIOLOGY
Payer: MEDICARE

## 2019-01-01 ENCOUNTER — HOSPITAL ENCOUNTER (EMERGENCY)
Age: 84
Discharge: HOME | End: 2019-06-09
Attending: EMERGENCY MEDICINE
Payer: MEDICARE

## 2019-01-01 ENCOUNTER — HOSPITAL ENCOUNTER (OUTPATIENT)
Dept: RADIATION ONCOLOGY | Age: 84
Discharge: HOME | End: 2019-05-03
Attending: RADIOLOGY
Payer: MEDICARE

## 2019-01-01 ENCOUNTER — APPOINTMENT (OUTPATIENT)
Dept: CT IMAGING | Age: 84
DRG: 870 | End: 2019-01-01
Payer: MEDICARE

## 2019-01-01 ENCOUNTER — TELEPHONE (OUTPATIENT)
Dept: CASE MANAGEMENT | Age: 84
End: 2019-01-01

## 2019-01-01 ENCOUNTER — HOSPITAL ENCOUNTER (OUTPATIENT)
Dept: RADIATION ONCOLOGY | Age: 84
Discharge: HOME | End: 2019-03-06
Payer: MEDICARE

## 2019-01-01 ENCOUNTER — HOSPITAL ENCOUNTER (INPATIENT)
Age: 84
LOS: 7 days | DRG: 870 | End: 2019-09-18
Attending: EMERGENCY MEDICINE | Admitting: INTERNAL MEDICINE
Payer: MEDICARE

## 2019-01-01 ENCOUNTER — HOSPITAL ENCOUNTER (OUTPATIENT)
Dept: INFUSION THERAPY | Age: 84
Discharge: HOME | End: 2019-04-25
Payer: MEDICARE

## 2019-01-01 ENCOUNTER — APPOINTMENT (OUTPATIENT)
Dept: GENERAL RADIOLOGY | Age: 84
DRG: 308 | End: 2019-01-01
Payer: MEDICARE

## 2019-01-01 ENCOUNTER — TELEPHONE (OUTPATIENT)
Dept: RADIATION ONCOLOGY | Age: 84
End: 2019-01-01

## 2019-01-01 ENCOUNTER — APPOINTMENT (OUTPATIENT)
Dept: GENERAL RADIOLOGY | Age: 84
End: 2019-01-01
Payer: MEDICARE

## 2019-01-01 ENCOUNTER — HOSPITAL ENCOUNTER (OUTPATIENT)
Dept: RADIATION ONCOLOGY | Age: 84
Discharge: HOME | End: 2019-05-01
Attending: RADIOLOGY
Payer: MEDICARE

## 2019-01-01 ENCOUNTER — HOSPITAL ENCOUNTER (INPATIENT)
Age: 84
LOS: 15 days | Discharge: NURSING, REHAB OR LONG-TERM CARE | DRG: 871 | End: 2019-07-08
Attending: EMERGENCY MEDICINE | Admitting: INTERNAL MEDICINE
Payer: MEDICARE

## 2019-01-01 ENCOUNTER — HOSPITAL ENCOUNTER (OUTPATIENT)
Dept: RADIATION ONCOLOGY | Age: 84
Discharge: HOME | End: 2019-06-10
Attending: RADIOLOGY
Payer: MEDICARE

## 2019-01-01 ENCOUNTER — APPOINTMENT (OUTPATIENT)
Dept: RADIATION ONCOLOGY | Age: 84
End: 2019-01-01
Attending: RADIOLOGY
Payer: MEDICARE

## 2019-01-01 ENCOUNTER — HOSPITAL ENCOUNTER (OUTPATIENT)
Dept: RADIATION ONCOLOGY | Age: 84
Discharge: HOME | End: 2019-04-24
Attending: RADIOLOGY
Payer: MEDICARE

## 2019-01-01 ENCOUNTER — HOSPITAL ENCOUNTER (INPATIENT)
Age: 84
LOS: 2 days | Discharge: HOME | DRG: 308 | End: 2019-07-17
Attending: EMERGENCY MEDICINE | Admitting: INTERNAL MEDICINE
Payer: MEDICARE

## 2019-01-01 ENCOUNTER — ANESTHESIA (OUTPATIENT)
Dept: ENDOSCOPY | Age: 84
DRG: 871 | End: 2019-01-01
Payer: MEDICARE

## 2019-01-01 ENCOUNTER — HOSPITAL ENCOUNTER (OUTPATIENT)
Dept: RADIATION ONCOLOGY | Age: 84
Discharge: HOME | End: 2019-01-31
Payer: MEDICARE

## 2019-01-01 ENCOUNTER — HOSPITAL ENCOUNTER (OUTPATIENT)
Dept: CT IMAGING | Age: 84
Discharge: HOME | End: 2019-06-10
Payer: MEDICARE

## 2019-01-01 ENCOUNTER — APPOINTMENT (OUTPATIENT)
Dept: ULTRASOUND IMAGING | Age: 84
DRG: 870 | End: 2019-01-01
Payer: MEDICARE

## 2019-01-01 ENCOUNTER — HOSPITAL ENCOUNTER (OUTPATIENT)
Dept: RADIATION ONCOLOGY | Age: 84
Discharge: HOME | End: 2019-02-25
Payer: MEDICARE

## 2019-01-01 VITALS
WEIGHT: 129 LBS | OXYGEN SATURATION: 96 % | BODY MASS INDEX: 19.61 KG/M2 | HEART RATE: 64 BPM | DIASTOLIC BLOOD PRESSURE: 48 MMHG | TEMPERATURE: 96 F | SYSTOLIC BLOOD PRESSURE: 84 MMHG

## 2019-01-01 VITALS
HEART RATE: 90 BPM | DIASTOLIC BLOOD PRESSURE: 64 MMHG | WEIGHT: 133.3 LBS | OXYGEN SATURATION: 97 % | BODY MASS INDEX: 20.27 KG/M2 | SYSTOLIC BLOOD PRESSURE: 142 MMHG

## 2019-01-01 VITALS
HEART RATE: 69 BPM | TEMPERATURE: 99.1 F | RESPIRATION RATE: 22 BRPM | DIASTOLIC BLOOD PRESSURE: 67 MMHG | SYSTOLIC BLOOD PRESSURE: 146 MMHG

## 2019-01-01 VITALS
OXYGEN SATURATION: 95 % | RESPIRATION RATE: 18 BRPM | WEIGHT: 129.8 LBS | TEMPERATURE: 98.3 F | SYSTOLIC BLOOD PRESSURE: 114 MMHG | DIASTOLIC BLOOD PRESSURE: 49 MMHG | BODY MASS INDEX: 19.67 KG/M2 | HEART RATE: 71 BPM | HEIGHT: 68 IN

## 2019-01-01 VITALS
SYSTOLIC BLOOD PRESSURE: 134 MMHG | TEMPERATURE: 99.2 F | WEIGHT: 130.5 LBS | RESPIRATION RATE: 16 BRPM | BODY MASS INDEX: 19.78 KG/M2 | HEIGHT: 68 IN | OXYGEN SATURATION: 99 % | DIASTOLIC BLOOD PRESSURE: 64 MMHG | HEART RATE: 96 BPM

## 2019-01-01 VITALS — OXYGEN SATURATION: 99 % | DIASTOLIC BLOOD PRESSURE: 77 MMHG | SYSTOLIC BLOOD PRESSURE: 125 MMHG

## 2019-01-01 VITALS
TEMPERATURE: 100.6 F | OXYGEN SATURATION: 93 % | HEIGHT: 68 IN | RESPIRATION RATE: 9 BRPM | SYSTOLIC BLOOD PRESSURE: 166 MMHG | WEIGHT: 161 LBS | DIASTOLIC BLOOD PRESSURE: 88 MMHG | BODY MASS INDEX: 24.4 KG/M2

## 2019-01-01 VITALS
RESPIRATION RATE: 18 BRPM | HEIGHT: 68 IN | WEIGHT: 131.31 LBS | DIASTOLIC BLOOD PRESSURE: 60 MMHG | BODY MASS INDEX: 19.9 KG/M2 | TEMPERATURE: 98.4 F | SYSTOLIC BLOOD PRESSURE: 104 MMHG | HEART RATE: 70 BPM

## 2019-01-01 VITALS
HEART RATE: 67 BPM | DIASTOLIC BLOOD PRESSURE: 78 MMHG | WEIGHT: 132.4 LBS | OXYGEN SATURATION: 97 % | TEMPERATURE: 97.6 F | SYSTOLIC BLOOD PRESSURE: 150 MMHG | BODY MASS INDEX: 20.13 KG/M2

## 2019-01-01 VITALS
HEART RATE: 72 BPM | TEMPERATURE: 98 F | WEIGHT: 133.2 LBS | SYSTOLIC BLOOD PRESSURE: 122 MMHG | HEIGHT: 68 IN | DIASTOLIC BLOOD PRESSURE: 60 MMHG | RESPIRATION RATE: 18 BRPM | BODY MASS INDEX: 20.19 KG/M2

## 2019-01-01 VITALS
BODY MASS INDEX: 20.16 KG/M2 | WEIGHT: 133 LBS | TEMPERATURE: 97.5 F | HEIGHT: 68 IN | SYSTOLIC BLOOD PRESSURE: 161 MMHG | DIASTOLIC BLOOD PRESSURE: 79 MMHG | RESPIRATION RATE: 18 BRPM | OXYGEN SATURATION: 96 % | HEART RATE: 75 BPM

## 2019-01-01 VITALS
TEMPERATURE: 98.2 F | RESPIRATION RATE: 22 BRPM | SYSTOLIC BLOOD PRESSURE: 104 MMHG | HEART RATE: 62 BPM | DIASTOLIC BLOOD PRESSURE: 54 MMHG

## 2019-01-01 DIAGNOSIS — A41.9 SEPTIC SHOCK (HCC): Primary | ICD-10-CM

## 2019-01-01 DIAGNOSIS — R50.9 FEVER, UNSPECIFIED FEVER CAUSE: Primary | ICD-10-CM

## 2019-01-01 DIAGNOSIS — C34.11 CANCER OF BRONCHUS OF RIGHT UPPER LOBE (HCC): ICD-10-CM

## 2019-01-01 DIAGNOSIS — C34.90 MALIGNANT NEOPLASM OF LUNG, UNSPECIFIED LATERALITY, UNSPECIFIED PART OF LUNG (HCC): Primary | ICD-10-CM

## 2019-01-01 DIAGNOSIS — R65.21 SEPTIC SHOCK (HCC): Primary | ICD-10-CM

## 2019-01-01 DIAGNOSIS — C80.1 CANCER (HCC): Primary | ICD-10-CM

## 2019-01-01 DIAGNOSIS — G20 PARKINSON'S DISEASE (HCC): ICD-10-CM

## 2019-01-01 DIAGNOSIS — J96.01 ACUTE RESPIRATORY FAILURE WITH HYPOXIA (HCC): ICD-10-CM

## 2019-01-01 DIAGNOSIS — E86.0 DEHYDRATION: ICD-10-CM

## 2019-01-01 DIAGNOSIS — E87.5 HYPERKALEMIA: ICD-10-CM

## 2019-01-01 DIAGNOSIS — N30.00 ACUTE CYSTITIS WITHOUT HEMATURIA: ICD-10-CM

## 2019-01-01 DIAGNOSIS — K83.8 COMMON BILE DUCT DILATATION: ICD-10-CM

## 2019-01-01 DIAGNOSIS — R00.1 BRADYCARDIA: Primary | ICD-10-CM

## 2019-01-01 DIAGNOSIS — N17.9 AKI (ACUTE KIDNEY INJURY) (HCC): ICD-10-CM

## 2019-01-01 DIAGNOSIS — C34.11 MALIGNANT NEOPLASM OF UPPER LOBE OF RIGHT LUNG (HCC): Primary | ICD-10-CM

## 2019-01-01 DIAGNOSIS — R26.2 AMBULATORY DYSFUNCTION: Primary | ICD-10-CM

## 2019-01-01 DIAGNOSIS — K56.7 ILEUS (HCC): ICD-10-CM

## 2019-01-01 LAB
AADO2: 119.1 MMHG
AADO2: 151.1 MMHG
AADO2: 177.5 MMHG
AADO2: 180 MMHG
AADO2: 196.2 MMHG
AADO2: 210.7 MMHG
AADO2: 247.7 MMHG
AADO2: 270.4 MMHG
AADO2: 322.9 MMHG
AADO2: 448.1 MMHG
AADO2: 96.9 MMHG
ABO/RH: NORMAL
ALBUMIN SERPL-MCNC: 1.5 G/DL (ref 3.5–5.2)
ALBUMIN SERPL-MCNC: 1.8 G/DL (ref 3.5–5.2)
ALBUMIN SERPL-MCNC: 1.8 G/DL (ref 3.5–5.2)
ALBUMIN SERPL-MCNC: 1.9 G/DL (ref 3.5–5.2)
ALBUMIN SERPL-MCNC: 2.1 G/DL (ref 3.5–5.2)
ALBUMIN SERPL-MCNC: 2.2 G/DL (ref 3.5–5.2)
ALBUMIN SERPL-MCNC: 2.8 G/DL (ref 3.5–5.2)
ALBUMIN SERPL-MCNC: 2.9 G/DL (ref 3.5–5.2)
ALBUMIN SERPL-MCNC: 3.8 G/DL (ref 3.5–5.2)
ALBUMIN SERPL-MCNC: 3.9 G/DL (ref 3.5–5.2)
ALP BLD-CCNC: 121 U/L (ref 40–129)
ALP BLD-CCNC: 255 U/L (ref 40–129)
ALP BLD-CCNC: 745 U/L (ref 40–129)
ALP BLD-CCNC: 91 U/L (ref 40–129)
ALP BLD-CCNC: 93 U/L (ref 40–129)
ALT SERPL-CCNC: 10 U/L (ref 0–40)
ALT SERPL-CCNC: 109 U/L (ref 0–40)
ALT SERPL-CCNC: 16 U/L (ref 0–40)
ALT SERPL-CCNC: 7 U/L (ref 0–40)
ALT SERPL-CCNC: <5 U/L (ref 0–40)
ANION GAP SERPL CALCULATED.3IONS-SCNC: 10 MMOL/L (ref 7–16)
ANION GAP SERPL CALCULATED.3IONS-SCNC: 11 MMOL/L (ref 7–16)
ANION GAP SERPL CALCULATED.3IONS-SCNC: 12 MMOL/L (ref 7–16)
ANION GAP SERPL CALCULATED.3IONS-SCNC: 13 MMOL/L (ref 7–16)
ANION GAP SERPL CALCULATED.3IONS-SCNC: 14 MMOL/L (ref 7–16)
ANION GAP SERPL CALCULATED.3IONS-SCNC: 15 MMOL/L (ref 7–16)
ANION GAP SERPL CALCULATED.3IONS-SCNC: 16 MMOL/L (ref 7–16)
ANION GAP SERPL CALCULATED.3IONS-SCNC: 17 MMOL/L (ref 7–16)
ANION GAP SERPL CALCULATED.3IONS-SCNC: 17 MMOL/L (ref 7–16)
ANION GAP SERPL CALCULATED.3IONS-SCNC: 19 MMOL/L (ref 7–16)
ANION GAP SERPL CALCULATED.3IONS-SCNC: 24 MMOL/L (ref 7–16)
ANION GAP SERPL CALCULATED.3IONS-SCNC: 7 MMOL/L (ref 7–16)
ANION GAP SERPL CALCULATED.3IONS-SCNC: 8 MMOL/L (ref 7–16)
ANION GAP SERPL CALCULATED.3IONS-SCNC: 9 MMOL/L (ref 7–16)
ANISOCYTOSIS: ABNORMAL
ANTIBODY SCREEN: NORMAL
AST SERPL-CCNC: 16 U/L (ref 0–39)
AST SERPL-CCNC: 18 U/L (ref 0–39)
AST SERPL-CCNC: 29 U/L (ref 0–39)
AST SERPL-CCNC: 40 U/L (ref 0–39)
AST SERPL-CCNC: 772 U/L (ref 0–39)
B.E.: -10.2 MMOL/L (ref -3–3)
B.E.: -10.5 MMOL/L (ref -3–3)
B.E.: -11.3 MMOL/L (ref -3–3)
B.E.: -11.5 MMOL/L (ref -3–3)
B.E.: -12 MMOL/L (ref -3–3)
B.E.: -13.7 MMOL/L (ref -3–3)
B.E.: -7.3 MMOL/L (ref -3–3)
B.E.: -8.5 MMOL/L (ref -3–3)
B.E.: -8.7 MMOL/L (ref -3–3)
B.E.: -9.5 MMOL/L (ref -3–3)
B.E.: -9.8 MMOL/L (ref -3–3)
B.E.: 1.2 MMOL/L (ref -3–3)
BACTERIA: ABNORMAL /HPF
BACTERIA: NORMAL /HPF
BASOPHILIC STIPPLING: ABNORMAL
BASOPHILIC STIPPLING: ABNORMAL
BASOPHILS ABSOLUTE: 0 E9/L (ref 0–0.2)
BASOPHILS ABSOLUTE: 0.02 E9/L (ref 0–0.2)
BASOPHILS ABSOLUTE: 0.02 E9/L (ref 0–0.2)
BASOPHILS ABSOLUTE: 0.03 E9/L (ref 0–0.2)
BASOPHILS ABSOLUTE: 0.04 E9/L (ref 0–0.2)
BASOPHILS ABSOLUTE: 0.06 E9/L (ref 0–0.2)
BASOPHILS ABSOLUTE: 0.08 E9/L (ref 0–0.2)
BASOPHILS RELATIVE PERCENT: 0 % (ref 0–2)
BASOPHILS RELATIVE PERCENT: 0.1 % (ref 0–2)
BASOPHILS RELATIVE PERCENT: 0.2 % (ref 0–2)
BASOPHILS RELATIVE PERCENT: 0.3 % (ref 0–2)
BASOPHILS RELATIVE PERCENT: 0.4 % (ref 0–2)
BASOPHILS RELATIVE PERCENT: 0.5 % (ref 0–2)
BASOPHILS RELATIVE PERCENT: 0.9 % (ref 0–2)
BILIRUB SERPL-MCNC: 0.3 MG/DL (ref 0–1.2)
BILIRUB SERPL-MCNC: 0.3 MG/DL (ref 0–1.2)
BILIRUB SERPL-MCNC: 0.4 MG/DL (ref 0–1.2)
BILIRUB SERPL-MCNC: 0.7 MG/DL (ref 0–1.2)
BILIRUB SERPL-MCNC: 1.5 MG/DL (ref 0–1.2)
BILIRUBIN DIRECT: 1.1 MG/DL (ref 0–0.3)
BILIRUBIN DIRECT: <0.2 MG/DL (ref 0–0.3)
BILIRUBIN URINE: ABNORMAL
BILIRUBIN URINE: NEGATIVE
BILIRUBIN, INDIRECT: ABNORMAL MG/DL (ref 0–1)
BLOOD BANK DISPENSE STATUS: NORMAL
BLOOD BANK PRODUCT CODE: NORMAL
BLOOD CULTURE, ROUTINE: ABNORMAL
BLOOD CULTURE, ROUTINE: ABNORMAL
BLOOD CULTURE, ROUTINE: NORMAL
BLOOD, URINE: ABNORMAL
BLOOD, URINE: NEGATIVE
BPU ID: NORMAL
BUN BLDV-MCNC: 30 MG/DL (ref 8–23)
BUN BLDV-MCNC: 32 MG/DL (ref 8–23)
BUN BLDV-MCNC: 32 MG/DL (ref 8–23)
BUN BLDV-MCNC: 33 MG/DL (ref 8–23)
BUN BLDV-MCNC: 34 MG/DL (ref 8–23)
BUN BLDV-MCNC: 35 MG/DL (ref 8–23)
BUN BLDV-MCNC: 39 MG/DL (ref 8–23)
BUN BLDV-MCNC: 40 MG/DL (ref 8–23)
BUN BLDV-MCNC: 43 MG/DL (ref 8–23)
BUN BLDV-MCNC: 45 MG/DL (ref 8–23)
BUN BLDV-MCNC: 48 MG/DL (ref 8–23)
BUN BLDV-MCNC: 48 MG/DL (ref 8–23)
BUN BLDV-MCNC: 50 MG/DL (ref 8–23)
BUN BLDV-MCNC: 51 MG/DL (ref 8–23)
BUN BLDV-MCNC: 51 MG/DL (ref 8–23)
BUN BLDV-MCNC: 53 MG/DL (ref 8–23)
BUN BLDV-MCNC: 54 MG/DL (ref 8–23)
BUN BLDV-MCNC: 54 MG/DL (ref 8–23)
BUN BLDV-MCNC: 56 MG/DL (ref 8–23)
BUN BLDV-MCNC: 56 MG/DL (ref 8–23)
BUN BLDV-MCNC: 58 MG/DL (ref 8–23)
BUN BLDV-MCNC: 62 MG/DL (ref 8–23)
BUN BLDV-MCNC: 62 MG/DL (ref 8–23)
BUN BLDV-MCNC: 63 MG/DL (ref 8–23)
BUN BLDV-MCNC: 65 MG/DL (ref 8–23)
BUN BLDV-MCNC: 65 MG/DL (ref 8–23)
BUN BLDV-MCNC: 67 MG/DL (ref 8–23)
BUN BLDV-MCNC: 68 MG/DL (ref 8–23)
BUN BLDV-MCNC: 70 MG/DL (ref 8–23)
BUN BLDV-MCNC: 70 MG/DL (ref 8–23)
BUN BLDV-MCNC: 74 MG/DL (ref 8–23)
BUN BLDV-MCNC: 76 MG/DL (ref 8–23)
BUN BLDV-MCNC: 78 MG/DL (ref 8–23)
BUN BLDV-MCNC: 81 MG/DL (ref 8–23)
BUN BLDV-MCNC: 84 MG/DL (ref 8–23)
BUN BLDV-MCNC: 85 MG/DL (ref 8–23)
BUN BLDV-MCNC: 87 MG/DL (ref 8–23)
BUN BLDV-MCNC: 91 MG/DL (ref 8–23)
BURR CELLS: ABNORMAL
C DIFF TOXIN/ANTIGEN: ABNORMAL
CALCIUM IONIZED: 1.1 MMOL/L (ref 1.15–1.33)
CALCIUM IONIZED: 1.15 MMOL/L (ref 1.15–1.33)
CALCIUM IONIZED: 1.2 MMOL/L (ref 1.15–1.33)
CALCIUM IONIZED: 1.21 MMOL/L (ref 1.15–1.33)
CALCIUM IONIZED: 1.21 MMOL/L (ref 1.15–1.33)
CALCIUM IONIZED: 1.24 MMOL/L (ref 1.15–1.33)
CALCIUM IONIZED: 1.24 MMOL/L (ref 1.15–1.33)
CALCIUM SERPL-MCNC: 7.5 MG/DL (ref 8.6–10.2)
CALCIUM SERPL-MCNC: 7.6 MG/DL (ref 8.6–10.2)
CALCIUM SERPL-MCNC: 7.6 MG/DL (ref 8.6–10.2)
CALCIUM SERPL-MCNC: 7.7 MG/DL (ref 8.6–10.2)
CALCIUM SERPL-MCNC: 7.8 MG/DL (ref 8.6–10.2)
CALCIUM SERPL-MCNC: 7.9 MG/DL (ref 8.6–10.2)
CALCIUM SERPL-MCNC: 8 MG/DL (ref 8.6–10.2)
CALCIUM SERPL-MCNC: 8.1 MG/DL (ref 8.6–10.2)
CALCIUM SERPL-MCNC: 8.1 MG/DL (ref 8.6–10.2)
CALCIUM SERPL-MCNC: 8.2 MG/DL (ref 8.6–10.2)
CALCIUM SERPL-MCNC: 8.3 MG/DL (ref 8.6–10.2)
CALCIUM SERPL-MCNC: 8.4 MG/DL (ref 8.6–10.2)
CALCIUM SERPL-MCNC: 8.5 MG/DL (ref 8.6–10.2)
CALCIUM SERPL-MCNC: 8.5 MG/DL (ref 8.6–10.2)
CALCIUM SERPL-MCNC: 8.6 MG/DL (ref 8.6–10.2)
CALCIUM SERPL-MCNC: 8.7 MG/DL (ref 8.6–10.2)
CALCIUM SERPL-MCNC: 8.8 MG/DL (ref 8.6–10.2)
CALCIUM SERPL-MCNC: 8.9 MG/DL (ref 8.6–10.2)
CALCIUM SERPL-MCNC: 9.1 MG/DL (ref 8.6–10.2)
CALCIUM SERPL-MCNC: 9.1 MG/DL (ref 8.6–10.2)
CALCIUM SERPL-MCNC: 9.2 MG/DL (ref 8.6–10.2)
CALCIUM SERPL-MCNC: 9.3 MG/DL (ref 8.6–10.2)
CALCIUM SERPL-MCNC: 9.4 MG/DL (ref 8.6–10.2)
CHLORIDE BLD-SCNC: 100 MMOL/L (ref 98–107)
CHLORIDE BLD-SCNC: 100 MMOL/L (ref 98–107)
CHLORIDE BLD-SCNC: 101 MMOL/L (ref 98–107)
CHLORIDE BLD-SCNC: 107 MMOL/L (ref 98–107)
CHLORIDE BLD-SCNC: 107 MMOL/L (ref 98–107)
CHLORIDE BLD-SCNC: 108 MMOL/L (ref 98–107)
CHLORIDE BLD-SCNC: 108 MMOL/L (ref 98–107)
CHLORIDE BLD-SCNC: 109 MMOL/L (ref 98–107)
CHLORIDE BLD-SCNC: 111 MMOL/L (ref 98–107)
CHLORIDE BLD-SCNC: 111 MMOL/L (ref 98–107)
CHLORIDE BLD-SCNC: 112 MMOL/L (ref 98–107)
CHLORIDE BLD-SCNC: 112 MMOL/L (ref 98–107)
CHLORIDE BLD-SCNC: 113 MMOL/L (ref 98–107)
CHLORIDE BLD-SCNC: 114 MMOL/L (ref 98–107)
CHLORIDE BLD-SCNC: 115 MMOL/L (ref 98–107)
CHLORIDE BLD-SCNC: 115 MMOL/L (ref 98–107)
CHLORIDE BLD-SCNC: 116 MMOL/L (ref 98–107)
CHLORIDE BLD-SCNC: 119 MMOL/L (ref 98–107)
CHLORIDE BLD-SCNC: 120 MMOL/L (ref 98–107)
CHLORIDE BLD-SCNC: 121 MMOL/L (ref 98–107)
CHLORIDE BLD-SCNC: 122 MMOL/L (ref 98–107)
CHLORIDE BLD-SCNC: 124 MMOL/L (ref 98–107)
CHLORIDE BLD-SCNC: 126 MMOL/L (ref 98–107)
CHLORIDE BLD-SCNC: 127 MMOL/L (ref 98–107)
CHLORIDE BLD-SCNC: 91 MMOL/L (ref 98–107)
CHLORIDE BLD-SCNC: 92 MMOL/L (ref 98–107)
CHLORIDE BLD-SCNC: 95 MMOL/L (ref 98–107)
CHLORIDE BLD-SCNC: 96 MMOL/L (ref 98–107)
CHLORIDE BLD-SCNC: 96 MMOL/L (ref 98–107)
CHLORIDE BLD-SCNC: 97 MMOL/L (ref 98–107)
CHLORIDE BLD-SCNC: 98 MMOL/L (ref 98–107)
CHLORIDE BLD-SCNC: 99 MMOL/L (ref 98–107)
CHLORIDE URINE RANDOM: <20 MMOL/L
CK MB: 6 NG/ML (ref 0–7.7)
CLARITY: ABNORMAL
CLARITY: CLEAR
CO2: 14 MMOL/L (ref 22–29)
CO2: 15 MMOL/L (ref 22–29)
CO2: 16 MMOL/L (ref 22–29)
CO2: 17 MMOL/L (ref 22–29)
CO2: 18 MMOL/L (ref 22–29)
CO2: 19 MMOL/L (ref 22–29)
CO2: 19 MMOL/L (ref 22–29)
CO2: 20 MMOL/L (ref 22–29)
CO2: 20 MMOL/L (ref 22–29)
CO2: 21 MMOL/L (ref 22–29)
CO2: 22 MMOL/L (ref 22–29)
CO2: 23 MMOL/L (ref 22–29)
CO2: 23 MMOL/L (ref 22–29)
CO2: 24 MMOL/L (ref 22–29)
CO2: 24 MMOL/L (ref 22–29)
CO2: 25 MMOL/L (ref 22–29)
CO2: 26 MMOL/L (ref 22–29)
CO2: 27 MMOL/L (ref 22–29)
CO2: 28 MMOL/L (ref 22–29)
CO2: 29 MMOL/L (ref 22–29)
CO2: 29 MMOL/L (ref 22–29)
CO2: 31 MMOL/L (ref 22–29)
CO2: 32 MMOL/L (ref 22–29)
CO2: 34 MMOL/L (ref 22–29)
COHB: 0.1 % (ref 0–1.5)
COHB: 0.1 % (ref 0–1.5)
COHB: 0.2 % (ref 0–1.5)
COHB: 0.3 % (ref 0–1.5)
COHB: 0.3 % (ref 0–1.5)
COHB: 0.4 % (ref 0–1.5)
COHB: 0.5 % (ref 0–1.5)
COHB: 0.5 % (ref 0–1.5)
COHB: 0.6 % (ref 0–1.5)
COHB: 0.9 % (ref 0–1.5)
COHB: 1 % (ref 0–1.5)
COHB: 1.5 % (ref 0–1.5)
COLOR: YELLOW
CORTISOL TOTAL: 96.3 MCG/DL (ref 2.68–18.4)
CREAT SERPL-MCNC: 1.2 MG/DL (ref 0.7–1.2)
CREAT SERPL-MCNC: 1.3 MG/DL (ref 0.7–1.2)
CREAT SERPL-MCNC: 1.4 MG/DL (ref 0.7–1.2)
CREAT SERPL-MCNC: 1.4 MG/DL (ref 0.7–1.2)
CREAT SERPL-MCNC: 1.5 MG/DL (ref 0.7–1.2)
CREAT SERPL-MCNC: 1.5 MG/DL (ref 0.7–1.2)
CREAT SERPL-MCNC: 1.6 MG/DL (ref 0.7–1.2)
CREAT SERPL-MCNC: 1.7 MG/DL (ref 0.7–1.2)
CREAT SERPL-MCNC: 1.8 MG/DL (ref 0.7–1.2)
CREAT SERPL-MCNC: 1.9 MG/DL (ref 0.7–1.2)
CREAT SERPL-MCNC: 2 MG/DL (ref 0.7–1.2)
CREAT SERPL-MCNC: 2 MG/DL (ref 0.7–1.2)
CREAT SERPL-MCNC: 2.1 MG/DL (ref 0.7–1.2)
CREAT SERPL-MCNC: 2.1 MG/DL (ref 0.7–1.2)
CREAT SERPL-MCNC: 2.2 MG/DL (ref 0.7–1.2)
CREAT SERPL-MCNC: 2.2 MG/DL (ref 0.7–1.2)
CREAT SERPL-MCNC: 2.3 MG/DL (ref 0.7–1.2)
CREAT SERPL-MCNC: 2.3 MG/DL (ref 0.7–1.2)
CREAT SERPL-MCNC: 2.4 MG/DL (ref 0.7–1.2)
CREAT SERPL-MCNC: 2.5 MG/DL (ref 0.7–1.2)
CREAT SERPL-MCNC: 2.6 MG/DL (ref 0.7–1.2)
CREAT SERPL-MCNC: 2.7 MG/DL (ref 0.7–1.2)
CREAT SERPL-MCNC: 2.9 MG/DL (ref 0.7–1.2)
CREAT SERPL-MCNC: 2.9 MG/DL (ref 0.7–1.2)
CREAT SERPL-MCNC: 3 MG/DL (ref 0.7–1.2)
CREATININE URINE: 70 MG/DL (ref 40–278)
CRITICAL: ABNORMAL
CULTURE, BLOOD 2: ABNORMAL
CULTURE, BLOOD 2: ABNORMAL
CULTURE, BLOOD 2: NORMAL
DATE ANALYZED: ABNORMAL
DATE OF COLLECTION: ABNORMAL
DESCRIPTION BLOOD BANK: NORMAL
DIGOXIN LEVEL: 1.7 NG/ML (ref 0.8–2)
DIGOXIN LEVEL: 3.5 NG/ML (ref 0.8–2)
DOHLE BODIES: ABNORMAL
EKG ATRIAL RATE: 104 BPM
EKG ATRIAL RATE: 109 BPM
EKG ATRIAL RATE: 227 BPM
EKG ATRIAL RATE: 277 BPM
EKG ATRIAL RATE: 48 BPM
EKG ATRIAL RATE: 72 BPM
EKG ATRIAL RATE: 95 BPM
EKG P AXIS: 65 DEGREES
EKG P AXIS: 83 DEGREES
EKG P-R INTERVAL: 208 MS
EKG P-R INTERVAL: 208 MS
EKG P-R INTERVAL: 96 MS
EKG Q-T INTERVAL: 298 MS
EKG Q-T INTERVAL: 332 MS
EKG Q-T INTERVAL: 346 MS
EKG Q-T INTERVAL: 346 MS
EKG Q-T INTERVAL: 376 MS
EKG Q-T INTERVAL: 380 MS
EKG Q-T INTERVAL: 448 MS
EKG QRS DURATION: 100 MS
EKG QRS DURATION: 102 MS
EKG QRS DURATION: 104 MS
EKG QRS DURATION: 78 MS
EKG QRS DURATION: 92 MS
EKG QRS DURATION: 92 MS
EKG QRS DURATION: 98 MS
EKG QTC CALCULATION (BAZETT): 331 MS
EKG QTC CALCULATION (BAZETT): 378 MS
EKG QTC CALCULATION (BAZETT): 410 MS
EKG QTC CALCULATION (BAZETT): 436 MS
EKG QTC CALCULATION (BAZETT): 465 MS
EKG QTC CALCULATION (BAZETT): 472 MS
EKG QTC CALCULATION (BAZETT): 490 MS
EKG R AXIS: -25 DEGREES
EKG R AXIS: -28 DEGREES
EKG R AXIS: -39 DEGREES
EKG R AXIS: -41 DEGREES
EKG R AXIS: -44 DEGREES
EKG R AXIS: 15 DEGREES
EKG R AXIS: 3 DEGREES
EKG T AXIS: 108 DEGREES
EKG T AXIS: 54 DEGREES
EKG T AXIS: 74 DEGREES
EKG T AXIS: 86 DEGREES
EKG T AXIS: 89 DEGREES
EKG T AXIS: 94 DEGREES
EKG T AXIS: 95 DEGREES
EKG VENTRICULAR RATE: 104 BPM
EKG VENTRICULAR RATE: 109 BPM
EKG VENTRICULAR RATE: 163 BPM
EKG VENTRICULAR RATE: 33 BPM
EKG VENTRICULAR RATE: 70 BPM
EKG VENTRICULAR RATE: 72 BPM
EKG VENTRICULAR RATE: 95 BPM
EOSINOPHILS ABSOLUTE: 0 E9/L (ref 0.05–0.5)
EOSINOPHILS ABSOLUTE: 0.01 E9/L (ref 0.05–0.5)
EOSINOPHILS ABSOLUTE: 0.01 E9/L (ref 0.05–0.5)
EOSINOPHILS ABSOLUTE: 0.07 E9/L (ref 0.05–0.5)
EOSINOPHILS ABSOLUTE: 0.07 E9/L (ref 0.05–0.5)
EOSINOPHILS ABSOLUTE: 0.08 E9/L (ref 0.05–0.5)
EOSINOPHILS ABSOLUTE: 0.16 E9/L (ref 0.05–0.5)
EOSINOPHILS ABSOLUTE: 0.21 E9/L (ref 0.05–0.5)
EOSINOPHILS ABSOLUTE: 0.24 E9/L (ref 0.05–0.5)
EOSINOPHILS ABSOLUTE: 0.26 E9/L (ref 0.05–0.5)
EOSINOPHILS ABSOLUTE: 0.3 E9/L (ref 0.05–0.5)
EOSINOPHILS ABSOLUTE: 0.36 E9/L (ref 0.05–0.5)
EOSINOPHILS ABSOLUTE: 0.39 E9/L (ref 0.05–0.5)
EOSINOPHILS ABSOLUTE: 0.44 E9/L (ref 0.05–0.5)
EOSINOPHILS RELATIVE PERCENT: 0 % (ref 0–6)
EOSINOPHILS RELATIVE PERCENT: 0.1 % (ref 0–6)
EOSINOPHILS RELATIVE PERCENT: 0.7 % (ref 0–6)
EOSINOPHILS RELATIVE PERCENT: 0.9 % (ref 0–6)
EOSINOPHILS RELATIVE PERCENT: 0.9 % (ref 0–6)
EOSINOPHILS RELATIVE PERCENT: 1.4 % (ref 0–6)
EOSINOPHILS RELATIVE PERCENT: 1.5 % (ref 0–6)
EOSINOPHILS RELATIVE PERCENT: 1.7 % (ref 0–6)
EOSINOPHILS RELATIVE PERCENT: 1.9 % (ref 0–6)
EOSINOPHILS RELATIVE PERCENT: 2.4 % (ref 0–6)
EOSINOPHILS RELATIVE PERCENT: 2.9 % (ref 0–6)
EOSINOPHILS RELATIVE PERCENT: 3.2 % (ref 0–6)
EOSINOPHILS RELATIVE PERCENT: 4 % (ref 0–6)
FILM ARRAY ADENOVIRUS: NORMAL
FILM ARRAY BORDETELLA PERTUSSIS: NORMAL
FILM ARRAY CHLAMYDOPHILIA PNEUMONIAE: NORMAL
FILM ARRAY CORONAVIRUS 229E: NORMAL
FILM ARRAY CORONAVIRUS HKU1: NORMAL
FILM ARRAY CORONAVIRUS NL63: NORMAL
FILM ARRAY CORONAVIRUS OC43: NORMAL
FILM ARRAY INFLUENZA A VIRUS 09H1: NORMAL
FILM ARRAY INFLUENZA A VIRUS H1: NORMAL
FILM ARRAY INFLUENZA A VIRUS H3: NORMAL
FILM ARRAY INFLUENZA A VIRUS: NORMAL
FILM ARRAY INFLUENZA B: NORMAL
FILM ARRAY METAPNEUMOVIRUS: NORMAL
FILM ARRAY MYCOPLASMA PNEUMONIAE: NORMAL
FILM ARRAY PARAINFLUENZA VIRUS 1: NORMAL
FILM ARRAY PARAINFLUENZA VIRUS 2: NORMAL
FILM ARRAY PARAINFLUENZA VIRUS 3: NORMAL
FILM ARRAY PARAINFLUENZA VIRUS 4: NORMAL
FILM ARRAY RESPIRATORY SYNCITIAL VIRUS: NORMAL
FILM ARRAY RHINOVIRUS/ENTEROVIRUS: NORMAL
FIO2: 100 %
FIO2: 100 %
FIO2: 40 %
FIO2: 40 %
FIO2: 45 %
FIO2: 45 %
FIO2: 50 %
FIO2: 60 %
FIO2: 70 %
GAMMA GLUTAMYL TRANSFERASE: 415 U/L (ref 10–71)
GFR AFRICAN AMERICAN: 24
GFR AFRICAN AMERICAN: 25
GFR AFRICAN AMERICAN: 25
GFR AFRICAN AMERICAN: 27
GFR AFRICAN AMERICAN: 28
GFR AFRICAN AMERICAN: 28
GFR AFRICAN AMERICAN: 30
GFR AFRICAN AMERICAN: 31
GFR AFRICAN AMERICAN: 33
GFR AFRICAN AMERICAN: 33
GFR AFRICAN AMERICAN: 35
GFR AFRICAN AMERICAN: 35
GFR AFRICAN AMERICAN: 36
GFR AFRICAN AMERICAN: 39
GFR AFRICAN AMERICAN: 39
GFR AFRICAN AMERICAN: 41
GFR AFRICAN AMERICAN: 44
GFR AFRICAN AMERICAN: 46
GFR AFRICAN AMERICAN: 50
GFR AFRICAN AMERICAN: 54
GFR AFRICAN AMERICAN: 58
GFR AFRICAN AMERICAN: 58
GFR AFRICAN AMERICAN: >60
GFR NON-AFRICAN AMERICAN: 20 ML/MIN/1.73
GFR NON-AFRICAN AMERICAN: 21 ML/MIN/1.73
GFR NON-AFRICAN AMERICAN: 21 ML/MIN/1.73
GFR NON-AFRICAN AMERICAN: 23 ML/MIN/1.73
GFR NON-AFRICAN AMERICAN: 24 ML/MIN/1.73
GFR NON-AFRICAN AMERICAN: 24 ML/MIN/1.73
GFR NON-AFRICAN AMERICAN: 25 ML/MIN/1.73
GFR NON-AFRICAN AMERICAN: 26 ML/MIN/1.73
GFR NON-AFRICAN AMERICAN: 27 ML/MIN/1.73
GFR NON-AFRICAN AMERICAN: 27 ML/MIN/1.73
GFR NON-AFRICAN AMERICAN: 29 ML/MIN/1.73
GFR NON-AFRICAN AMERICAN: 29 ML/MIN/1.73
GFR NON-AFRICAN AMERICAN: 30 ML/MIN/1.73
GFR NON-AFRICAN AMERICAN: 32 ML/MIN/1.73
GFR NON-AFRICAN AMERICAN: 32 ML/MIN/1.73
GFR NON-AFRICAN AMERICAN: 34 ML/MIN/1.73
GFR NON-AFRICAN AMERICAN: 36 ML/MIN/1.73
GFR NON-AFRICAN AMERICAN: 38 ML/MIN/1.73
GFR NON-AFRICAN AMERICAN: 41 ML/MIN/1.73
GFR NON-AFRICAN AMERICAN: 44 ML/MIN/1.73
GFR NON-AFRICAN AMERICAN: 48 ML/MIN/1.73
GFR NON-AFRICAN AMERICAN: 48 ML/MIN/1.73
GFR NON-AFRICAN AMERICAN: 52 ML/MIN/1.73
GFR NON-AFRICAN AMERICAN: 57 ML/MIN/1.73
GLUCOSE BLD-MCNC: 100 MG/DL (ref 74–99)
GLUCOSE BLD-MCNC: 104 MG/DL (ref 74–99)
GLUCOSE BLD-MCNC: 105 MG/DL (ref 74–99)
GLUCOSE BLD-MCNC: 106 MG/DL (ref 74–99)
GLUCOSE BLD-MCNC: 115 MG/DL (ref 74–99)
GLUCOSE BLD-MCNC: 120 MG/DL (ref 74–99)
GLUCOSE BLD-MCNC: 123 MG/DL (ref 74–99)
GLUCOSE BLD-MCNC: 123 MG/DL (ref 74–99)
GLUCOSE BLD-MCNC: 126 MG/DL (ref 74–99)
GLUCOSE BLD-MCNC: 127 MG/DL (ref 74–99)
GLUCOSE BLD-MCNC: 136 MG/DL (ref 74–99)
GLUCOSE BLD-MCNC: 138 MG/DL (ref 74–99)
GLUCOSE BLD-MCNC: 141 MG/DL (ref 74–99)
GLUCOSE BLD-MCNC: 143 MG/DL (ref 74–99)
GLUCOSE BLD-MCNC: 146 MG/DL (ref 74–99)
GLUCOSE BLD-MCNC: 147 MG/DL (ref 74–99)
GLUCOSE BLD-MCNC: 153 MG/DL (ref 74–99)
GLUCOSE BLD-MCNC: 153 MG/DL (ref 74–99)
GLUCOSE BLD-MCNC: 154 MG/DL (ref 74–99)
GLUCOSE BLD-MCNC: 155 MG/DL (ref 74–99)
GLUCOSE BLD-MCNC: 157 MG/DL (ref 74–99)
GLUCOSE BLD-MCNC: 161 MG/DL (ref 74–99)
GLUCOSE BLD-MCNC: 166 MG/DL (ref 74–99)
GLUCOSE BLD-MCNC: 176 MG/DL (ref 74–99)
GLUCOSE BLD-MCNC: 178 MG/DL (ref 74–99)
GLUCOSE BLD-MCNC: 185 MG/DL (ref 74–99)
GLUCOSE BLD-MCNC: 186 MG/DL (ref 74–99)
GLUCOSE BLD-MCNC: 191 MG/DL (ref 74–99)
GLUCOSE BLD-MCNC: 191 MG/DL (ref 74–99)
GLUCOSE BLD-MCNC: 194 MG/DL (ref 74–99)
GLUCOSE BLD-MCNC: 205 MG/DL (ref 74–99)
GLUCOSE BLD-MCNC: 225 MG/DL (ref 74–99)
GLUCOSE BLD-MCNC: 233 MG/DL (ref 74–99)
GLUCOSE BLD-MCNC: 247 MG/DL (ref 74–99)
GLUCOSE BLD-MCNC: 362 MG/DL (ref 74–99)
GLUCOSE BLD-MCNC: 516 MG/DL (ref 74–99)
GLUCOSE BLD-MCNC: 63 MG/DL (ref 74–99)
GLUCOSE BLD-MCNC: 79 MG/DL (ref 74–99)
GLUCOSE BLD-MCNC: 89 MG/DL (ref 74–99)
GLUCOSE BLD-MCNC: 93 MG/DL (ref 74–99)
GLUCOSE BLD-MCNC: 95 MG/DL (ref 74–99)
GLUCOSE BLD-MCNC: 99 MG/DL (ref 74–99)
GLUCOSE URINE: 100 MG/DL
GLUCOSE URINE: NEGATIVE MG/DL
HCO3: 11.9 MMOL/L (ref 22–26)
HCO3: 13.7 MMOL/L (ref 22–26)
HCO3: 13.9 MMOL/L (ref 22–26)
HCO3: 14.1 MMOL/L (ref 22–26)
HCO3: 14.2 MMOL/L (ref 22–26)
HCO3: 15.1 MMOL/L (ref 22–26)
HCO3: 15.2 MMOL/L (ref 22–26)
HCO3: 15.2 MMOL/L (ref 22–26)
HCO3: 15.3 MMOL/L (ref 22–26)
HCO3: 15.8 MMOL/L (ref 22–26)
HCO3: 16.5 MMOL/L (ref 22–26)
HCO3: 24.6 MMOL/L (ref 22–26)
HCT VFR BLD CALC: 21.5 % (ref 37–54)
HCT VFR BLD CALC: 21.7 % (ref 37–54)
HCT VFR BLD CALC: 22.4 % (ref 37–54)
HCT VFR BLD CALC: 23.5 % (ref 37–54)
HCT VFR BLD CALC: 24.1 % (ref 37–54)
HCT VFR BLD CALC: 24.2 % (ref 37–54)
HCT VFR BLD CALC: 24.4 % (ref 37–54)
HCT VFR BLD CALC: 24.8 % (ref 37–54)
HCT VFR BLD CALC: 25.4 % (ref 37–54)
HCT VFR BLD CALC: 26.1 % (ref 37–54)
HCT VFR BLD CALC: 26.2 % (ref 37–54)
HCT VFR BLD CALC: 26.6 % (ref 37–54)
HCT VFR BLD CALC: 27.2 % (ref 37–54)
HCT VFR BLD CALC: 27.2 % (ref 37–54)
HCT VFR BLD CALC: 27.6 % (ref 37–54)
HCT VFR BLD CALC: 27.6 % (ref 37–54)
HCT VFR BLD CALC: 27.8 % (ref 37–54)
HCT VFR BLD CALC: 27.9 % (ref 37–54)
HCT VFR BLD CALC: 28.6 % (ref 37–54)
HCT VFR BLD CALC: 28.7 % (ref 37–54)
HCT VFR BLD CALC: 29.1 % (ref 37–54)
HCT VFR BLD CALC: 29.2 % (ref 37–54)
HCT VFR BLD CALC: 29.2 % (ref 37–54)
HCT VFR BLD CALC: 29.4 % (ref 37–54)
HCT VFR BLD CALC: 29.8 % (ref 37–54)
HCT VFR BLD CALC: 30.2 % (ref 37–54)
HCT VFR BLD CALC: 30.9 % (ref 37–54)
HCT VFR BLD CALC: 32.4 % (ref 37–54)
HCT VFR BLD CALC: 32.4 % (ref 37–54)
HCT VFR BLD CALC: 33.2 % (ref 37–54)
HCT VFR BLD CALC: 33.5 % (ref 37–54)
HCT VFR BLD CALC: 34.3 % (ref 37–54)
HCT VFR BLD CALC: 38.7 % (ref 37–54)
HEMOGLOBIN: 10 G/DL (ref 12.5–16.5)
HEMOGLOBIN: 10.1 G/DL (ref 12.5–16.5)
HEMOGLOBIN: 10.3 G/DL (ref 12.5–16.5)
HEMOGLOBIN: 10.6 G/DL (ref 12.5–16.5)
HEMOGLOBIN: 10.7 G/DL (ref 12.5–16.5)
HEMOGLOBIN: 12.6 G/DL (ref 12.5–16.5)
HEMOGLOBIN: 6.8 G/DL (ref 12.5–16.5)
HEMOGLOBIN: 6.8 G/DL (ref 12.5–16.5)
HEMOGLOBIN: 7.1 G/DL (ref 12.5–16.5)
HEMOGLOBIN: 7.5 G/DL (ref 12.5–16.5)
HEMOGLOBIN: 7.5 G/DL (ref 12.5–16.5)
HEMOGLOBIN: 7.8 G/DL (ref 12.5–16.5)
HEMOGLOBIN: 7.9 G/DL (ref 12.5–16.5)
HEMOGLOBIN: 8.1 G/DL (ref 12.5–16.5)
HEMOGLOBIN: 8.1 G/DL (ref 12.5–16.5)
HEMOGLOBIN: 8.2 G/DL (ref 12.5–16.5)
HEMOGLOBIN: 8.5 G/DL (ref 12.5–16.5)
HEMOGLOBIN: 8.6 G/DL (ref 12.5–16.5)
HEMOGLOBIN: 8.7 G/DL (ref 12.5–16.5)
HEMOGLOBIN: 8.9 G/DL (ref 12.5–16.5)
HEMOGLOBIN: 9 G/DL (ref 12.5–16.5)
HEMOGLOBIN: 9.1 G/DL (ref 12.5–16.5)
HEMOGLOBIN: 9.1 G/DL (ref 12.5–16.5)
HEMOGLOBIN: 9.2 G/DL (ref 12.5–16.5)
HEMOGLOBIN: 9.3 G/DL (ref 12.5–16.5)
HEMOGLOBIN: 9.4 G/DL (ref 12.5–16.5)
HEMOGLOBIN: 9.7 G/DL (ref 12.5–16.5)
HEMOGLOBIN: 9.9 G/DL (ref 12.5–16.5)
HEMOGLOBIN: 9.9 G/DL (ref 12.5–16.5)
HHB: 0.3 % (ref 0–5)
HHB: 0.5 % (ref 0–5)
HHB: 0.7 % (ref 0–5)
HHB: 1 % (ref 0–5)
HHB: 1.5 % (ref 0–5)
HHB: 1.5 % (ref 0–5)
HHB: 1.6 % (ref 0–5)
HHB: 1.7 % (ref 0–5)
HHB: 1.7 % (ref 0–5)
HHB: 1.9 % (ref 0–5)
HHB: 6.6 % (ref 0–5)
HHB: 9 % (ref 0–5)
HYPOCHROMIA: ABNORMAL
IMMATURE GRANULOCYTES #: 0.07 E9/L
IMMATURE GRANULOCYTES #: 0.08 E9/L
IMMATURE GRANULOCYTES #: 0.1 E9/L
IMMATURE GRANULOCYTES #: 0.13 E9/L
IMMATURE GRANULOCYTES #: 0.17 E9/L
IMMATURE GRANULOCYTES #: 0.2 E9/L
IMMATURE GRANULOCYTES #: 0.2 E9/L
IMMATURE GRANULOCYTES #: 0.21 E9/L
IMMATURE GRANULOCYTES #: 0.3 E9/L
IMMATURE GRANULOCYTES #: 0.39 E9/L
IMMATURE GRANULOCYTES %: 0.6 % (ref 0–5)
IMMATURE GRANULOCYTES %: 1 % (ref 0–5)
IMMATURE GRANULOCYTES %: 1 % (ref 0–5)
IMMATURE GRANULOCYTES %: 1.2 % (ref 0–5)
IMMATURE GRANULOCYTES %: 1.5 % (ref 0–5)
IMMATURE GRANULOCYTES %: 1.7 % (ref 0–5)
IMMATURE GRANULOCYTES %: 2 % (ref 0–5)
IMMATURE GRANULOCYTES %: 2.1 % (ref 0–5)
IMMATURE GRANULOCYTES %: 2.1 % (ref 0–5)
IMMATURE GRANULOCYTES %: 2.2 % (ref 0–5)
KETONES, URINE: 15 MG/DL
KETONES, URINE: NEGATIVE MG/DL
LAB: ABNORMAL
LACTIC ACID, SEPSIS: 1.9 MMOL/L (ref 0.5–1.9)
LACTIC ACID, SEPSIS: 3.9 MMOL/L (ref 0.5–1.9)
LACTIC ACID, SEPSIS: 5 MMOL/L (ref 0.5–1.9)
LACTIC ACID: 0.8 MMOL/L (ref 0.5–2.2)
LACTIC ACID: 1.5 MMOL/L (ref 0.5–2.2)
LACTIC ACID: 1.8 MMOL/L (ref 0.5–2.2)
LACTIC ACID: 3.3 MMOL/L (ref 0.5–2.2)
LACTIC ACID: 3.7 MMOL/L (ref 0.5–2.2)
LACTIC ACID: 3.8 MMOL/L (ref 0.5–2.2)
LACTIC ACID: 6.2 MMOL/L (ref 0.5–2.2)
LACTIC ACID: 6.5 MMOL/L (ref 0.5–2.2)
LEUKOCYTE ESTERASE, URINE: ABNORMAL
LV EF: 70 %
LVEF MODALITY: NORMAL
LYMPHOCYTES ABSOLUTE: 0 E9/L (ref 1.5–4)
LYMPHOCYTES ABSOLUTE: 0 E9/L (ref 1.5–4)
LYMPHOCYTES ABSOLUTE: 0.11 E9/L (ref 1.5–4)
LYMPHOCYTES ABSOLUTE: 0.17 E9/L (ref 1.5–4)
LYMPHOCYTES ABSOLUTE: 0.24 E9/L (ref 1.5–4)
LYMPHOCYTES ABSOLUTE: 0.25 E9/L (ref 1.5–4)
LYMPHOCYTES ABSOLUTE: 0.32 E9/L (ref 1.5–4)
LYMPHOCYTES ABSOLUTE: 0.36 E9/L (ref 1.5–4)
LYMPHOCYTES ABSOLUTE: 0.38 E9/L (ref 1.5–4)
LYMPHOCYTES ABSOLUTE: 0.39 E9/L (ref 1.5–4)
LYMPHOCYTES ABSOLUTE: 0.44 E9/L (ref 1.5–4)
LYMPHOCYTES ABSOLUTE: 0.58 E9/L (ref 1.5–4)
LYMPHOCYTES ABSOLUTE: 0.59 E9/L (ref 1.5–4)
LYMPHOCYTES ABSOLUTE: 0.59 E9/L (ref 1.5–4)
LYMPHOCYTES ABSOLUTE: 0.61 E9/L (ref 1.5–4)
LYMPHOCYTES ABSOLUTE: 0.62 E9/L (ref 1.5–4)
LYMPHOCYTES ABSOLUTE: 0.63 E9/L (ref 1.5–4)
LYMPHOCYTES ABSOLUTE: 0.74 E9/L (ref 1.5–4)
LYMPHOCYTES ABSOLUTE: 0.74 E9/L (ref 1.5–4)
LYMPHOCYTES ABSOLUTE: 0.79 E9/L (ref 1.5–4)
LYMPHOCYTES ABSOLUTE: 0.83 E9/L (ref 1.5–4)
LYMPHOCYTES RELATIVE PERCENT: 0.6 % (ref 20–42)
LYMPHOCYTES RELATIVE PERCENT: 0.9 % (ref 20–42)
LYMPHOCYTES RELATIVE PERCENT: 1.7 % (ref 20–42)
LYMPHOCYTES RELATIVE PERCENT: 1.9 % (ref 20–42)
LYMPHOCYTES RELATIVE PERCENT: 2.6 % (ref 20–42)
LYMPHOCYTES RELATIVE PERCENT: 3.2 % (ref 20–42)
LYMPHOCYTES RELATIVE PERCENT: 3.5 % (ref 20–42)
LYMPHOCYTES RELATIVE PERCENT: 4 % (ref 20–42)
LYMPHOCYTES RELATIVE PERCENT: 4.3 % (ref 20–42)
LYMPHOCYTES RELATIVE PERCENT: 4.4 % (ref 20–42)
LYMPHOCYTES RELATIVE PERCENT: 5.1 % (ref 20–42)
LYMPHOCYTES RELATIVE PERCENT: 5.2 % (ref 20–42)
LYMPHOCYTES RELATIVE PERCENT: 5.3 % (ref 20–42)
LYMPHOCYTES RELATIVE PERCENT: 5.9 % (ref 20–42)
LYMPHOCYTES RELATIVE PERCENT: 6 % (ref 20–42)
LYMPHOCYTES RELATIVE PERCENT: 6.2 % (ref 20–42)
LYMPHOCYTES RELATIVE PERCENT: 6.4 % (ref 20–42)
LYMPHOCYTES RELATIVE PERCENT: 6.6 % (ref 20–42)
LYMPHOCYTES RELATIVE PERCENT: 7.3 % (ref 20–42)
LYMPHOCYTES RELATIVE PERCENT: 7.8 % (ref 20–42)
Lab: ABNORMAL
MAGNESIUM: 2.1 MG/DL (ref 1.6–2.6)
MAGNESIUM: 2.2 MG/DL (ref 1.6–2.6)
MAGNESIUM: 2.3 MG/DL (ref 1.6–2.6)
MAGNESIUM: 2.3 MG/DL (ref 1.6–2.6)
MCH RBC QN AUTO: 31.3 PG (ref 26–35)
MCH RBC QN AUTO: 31.5 PG (ref 26–35)
MCH RBC QN AUTO: 31.9 PG (ref 26–35)
MCH RBC QN AUTO: 32 PG (ref 26–35)
MCH RBC QN AUTO: 32.1 PG (ref 26–35)
MCH RBC QN AUTO: 32.1 PG (ref 26–35)
MCH RBC QN AUTO: 32.2 PG (ref 26–35)
MCH RBC QN AUTO: 32.4 PG (ref 26–35)
MCH RBC QN AUTO: 32.7 PG (ref 26–35)
MCH RBC QN AUTO: 32.8 PG (ref 26–35)
MCH RBC QN AUTO: 32.9 PG (ref 26–35)
MCH RBC QN AUTO: 32.9 PG (ref 26–35)
MCH RBC QN AUTO: 33 PG (ref 26–35)
MCH RBC QN AUTO: 33.1 PG (ref 26–35)
MCH RBC QN AUTO: 33.2 PG (ref 26–35)
MCH RBC QN AUTO: 33.3 PG (ref 26–35)
MCH RBC QN AUTO: 33.6 PG (ref 26–35)
MCHC RBC AUTO-ENTMCNC: 30.7 % (ref 32–34.5)
MCHC RBC AUTO-ENTMCNC: 30.9 % (ref 32–34.5)
MCHC RBC AUTO-ENTMCNC: 31 % (ref 32–34.5)
MCHC RBC AUTO-ENTMCNC: 31.1 % (ref 32–34.5)
MCHC RBC AUTO-ENTMCNC: 31.1 % (ref 32–34.5)
MCHC RBC AUTO-ENTMCNC: 31.2 % (ref 32–34.5)
MCHC RBC AUTO-ENTMCNC: 31.3 % (ref 32–34.5)
MCHC RBC AUTO-ENTMCNC: 31.3 % (ref 32–34.5)
MCHC RBC AUTO-ENTMCNC: 31.5 % (ref 32–34.5)
MCHC RBC AUTO-ENTMCNC: 31.6 % (ref 32–34.5)
MCHC RBC AUTO-ENTMCNC: 31.6 % (ref 32–34.5)
MCHC RBC AUTO-ENTMCNC: 31.7 % (ref 32–34.5)
MCHC RBC AUTO-ENTMCNC: 31.8 % (ref 32–34.5)
MCHC RBC AUTO-ENTMCNC: 31.9 % (ref 32–34.5)
MCHC RBC AUTO-ENTMCNC: 32 % (ref 32–34.5)
MCHC RBC AUTO-ENTMCNC: 32.2 % (ref 32–34.5)
MCHC RBC AUTO-ENTMCNC: 32.2 % (ref 32–34.5)
MCHC RBC AUTO-ENTMCNC: 32.4 % (ref 32–34.5)
MCHC RBC AUTO-ENTMCNC: 32.6 % (ref 32–34.5)
MCHC RBC AUTO-ENTMCNC: 32.8 % (ref 32–34.5)
MCHC RBC AUTO-ENTMCNC: 33.2 % (ref 32–34.5)
MCV RBC AUTO: 100.3 FL (ref 80–99.9)
MCV RBC AUTO: 100.4 FL (ref 80–99.9)
MCV RBC AUTO: 100.4 FL (ref 80–99.9)
MCV RBC AUTO: 100.5 FL (ref 80–99.9)
MCV RBC AUTO: 100.7 FL (ref 80–99.9)
MCV RBC AUTO: 101.3 FL (ref 80–99.9)
MCV RBC AUTO: 101.5 FL (ref 80–99.9)
MCV RBC AUTO: 101.5 FL (ref 80–99.9)
MCV RBC AUTO: 102.2 FL (ref 80–99.9)
MCV RBC AUTO: 102.4 FL (ref 80–99.9)
MCV RBC AUTO: 102.6 FL (ref 80–99.9)
MCV RBC AUTO: 102.7 FL (ref 80–99.9)
MCV RBC AUTO: 103.5 FL (ref 80–99.9)
MCV RBC AUTO: 103.5 FL (ref 80–99.9)
MCV RBC AUTO: 103.6 FL (ref 80–99.9)
MCV RBC AUTO: 104 FL (ref 80–99.9)
MCV RBC AUTO: 104.2 FL (ref 80–99.9)
MCV RBC AUTO: 104.6 FL (ref 80–99.9)
MCV RBC AUTO: 104.8 FL (ref 80–99.9)
MCV RBC AUTO: 106.2 FL (ref 80–99.9)
MCV RBC AUTO: 106.2 FL (ref 80–99.9)
MCV RBC AUTO: 106.3 FL (ref 80–99.9)
MCV RBC AUTO: 98.5 FL (ref 80–99.9)
MCV RBC AUTO: 99.3 FL (ref 80–99.9)
MCV RBC AUTO: 99.6 FL (ref 80–99.9)
METAMYELOCYTES RELATIVE PERCENT: 4.3 % (ref 0–1)
METER GLUCOSE: 117 MG/DL (ref 74–99)
METER GLUCOSE: 120 MG/DL (ref 74–99)
METER GLUCOSE: 320 MG/DL (ref 74–99)
METER GLUCOSE: 41 MG/DL (ref 74–99)
METER GLUCOSE: 41 MG/DL (ref 74–99)
METER GLUCOSE: 44 MG/DL (ref 74–99)
METER GLUCOSE: 54 MG/DL (ref 74–99)
METER GLUCOSE: 69 MG/DL (ref 74–99)
METER GLUCOSE: 73 MG/DL (ref 74–99)
METER GLUCOSE: 78 MG/DL (ref 74–99)
METER GLUCOSE: 79 MG/DL (ref 74–99)
METER GLUCOSE: 87 MG/DL (ref 74–99)
METER GLUCOSE: 96 MG/DL (ref 74–99)
METER GLUCOSE: 99 MG/DL (ref 74–99)
METHB: 0.1 % (ref 0–1.5)
METHB: 0.2 % (ref 0–1.5)
METHB: 0.3 % (ref 0–1.5)
METHB: 0.4 % (ref 0–1.5)
MODE: ABNORMAL
MODE: AC
MONOCYTES ABSOLUTE: 0.19 E9/L (ref 0.1–0.95)
MONOCYTES ABSOLUTE: 0.28 E9/L (ref 0.1–0.95)
MONOCYTES ABSOLUTE: 0.33 E9/L (ref 0.1–0.95)
MONOCYTES ABSOLUTE: 0.36 E9/L (ref 0.1–0.95)
MONOCYTES ABSOLUTE: 0.49 E9/L (ref 0.1–0.95)
MONOCYTES ABSOLUTE: 0.51 E9/L (ref 0.1–0.95)
MONOCYTES ABSOLUTE: 0.52 E9/L (ref 0.1–0.95)
MONOCYTES ABSOLUTE: 0.53 E9/L (ref 0.1–0.95)
MONOCYTES ABSOLUTE: 0.64 E9/L (ref 0.1–0.95)
MONOCYTES ABSOLUTE: 0.7 E9/L (ref 0.1–0.95)
MONOCYTES ABSOLUTE: 0.71 E9/L (ref 0.1–0.95)
MONOCYTES ABSOLUTE: 0.73 E9/L (ref 0.1–0.95)
MONOCYTES ABSOLUTE: 0.76 E9/L (ref 0.1–0.95)
MONOCYTES ABSOLUTE: 0.8 E9/L (ref 0.1–0.95)
MONOCYTES ABSOLUTE: 1 E9/L (ref 0.1–0.95)
MONOCYTES ABSOLUTE: 1.02 E9/L (ref 0.1–0.95)
MONOCYTES ABSOLUTE: 1.06 E9/L (ref 0.1–0.95)
MONOCYTES ABSOLUTE: 1.09 E9/L (ref 0.1–0.95)
MONOCYTES ABSOLUTE: 1.16 E9/L (ref 0.1–0.95)
MONOCYTES ABSOLUTE: 1.18 E9/L (ref 0.1–0.95)
MONOCYTES ABSOLUTE: 1.19 E9/L (ref 0.1–0.95)
MONOCYTES ABSOLUTE: 1.33 E9/L (ref 0.1–0.95)
MONOCYTES ABSOLUTE: 3.63 E9/L (ref 0.1–0.95)
MONOCYTES RELATIVE PERCENT: 0.9 % (ref 2–12)
MONOCYTES RELATIVE PERCENT: 0.9 % (ref 2–12)
MONOCYTES RELATIVE PERCENT: 1.7 % (ref 2–12)
MONOCYTES RELATIVE PERCENT: 11.4 % (ref 2–12)
MONOCYTES RELATIVE PERCENT: 12.3 % (ref 2–12)
MONOCYTES RELATIVE PERCENT: 12.5 % (ref 2–12)
MONOCYTES RELATIVE PERCENT: 13.4 % (ref 2–12)
MONOCYTES RELATIVE PERCENT: 2.6 % (ref 2–12)
MONOCYTES RELATIVE PERCENT: 2.6 % (ref 2–12)
MONOCYTES RELATIVE PERCENT: 3.5 % (ref 2–12)
MONOCYTES RELATIVE PERCENT: 4 % (ref 2–12)
MONOCYTES RELATIVE PERCENT: 4.3 % (ref 2–12)
MONOCYTES RELATIVE PERCENT: 4.7 % (ref 2–12)
MONOCYTES RELATIVE PERCENT: 5.2 % (ref 2–12)
MONOCYTES RELATIVE PERCENT: 6.1 % (ref 2–12)
MONOCYTES RELATIVE PERCENT: 6.3 % (ref 2–12)
MONOCYTES RELATIVE PERCENT: 7 % (ref 2–12)
MONOCYTES RELATIVE PERCENT: 7 % (ref 2–12)
MONOCYTES RELATIVE PERCENT: 7.4 % (ref 2–12)
MONOCYTES RELATIVE PERCENT: 8 % (ref 2–12)
MONOCYTES RELATIVE PERCENT: 8.2 % (ref 2–12)
MONOCYTES RELATIVE PERCENT: 8.6 % (ref 2–12)
MONOCYTES RELATIVE PERCENT: 8.7 % (ref 2–12)
MYELOCYTE PERCENT: 0.9 % (ref 0–0)
NEUTROPHILS ABSOLUTE: 10.14 E9/L (ref 1.8–7.3)
NEUTROPHILS ABSOLUTE: 11.35 E9/L (ref 1.8–7.3)
NEUTROPHILS ABSOLUTE: 11.74 E9/L (ref 1.8–7.3)
NEUTROPHILS ABSOLUTE: 12.04 E9/L (ref 1.8–7.3)
NEUTROPHILS ABSOLUTE: 16.36 E9/L (ref 1.8–7.3)
NEUTROPHILS ABSOLUTE: 16.76 E9/L (ref 1.8–7.3)
NEUTROPHILS ABSOLUTE: 18.24 E9/L (ref 1.8–7.3)
NEUTROPHILS ABSOLUTE: 24.54 E9/L (ref 1.8–7.3)
NEUTROPHILS ABSOLUTE: 26.77 E9/L (ref 1.8–7.3)
NEUTROPHILS ABSOLUTE: 38.02 E9/L (ref 1.8–7.3)
NEUTROPHILS ABSOLUTE: 5.37 E9/L (ref 1.8–7.3)
NEUTROPHILS ABSOLUTE: 56.87 E9/L (ref 1.8–7.3)
NEUTROPHILS ABSOLUTE: 6.42 E9/L (ref 1.8–7.3)
NEUTROPHILS ABSOLUTE: 6.97 E9/L (ref 1.8–7.3)
NEUTROPHILS ABSOLUTE: 7.47 E9/L (ref 1.8–7.3)
NEUTROPHILS ABSOLUTE: 7.57 E9/L (ref 1.8–7.3)
NEUTROPHILS ABSOLUTE: 7.73 E9/L (ref 1.8–7.3)
NEUTROPHILS ABSOLUTE: 7.78 E9/L (ref 1.8–7.3)
NEUTROPHILS ABSOLUTE: 8.04 E9/L (ref 1.8–7.3)
NEUTROPHILS ABSOLUTE: 8.19 E9/L (ref 1.8–7.3)
NEUTROPHILS ABSOLUTE: 9.32 E9/L (ref 1.8–7.3)
NEUTROPHILS ABSOLUTE: 9.48 E9/L (ref 1.8–7.3)
NEUTROPHILS ABSOLUTE: 9.94 E9/L (ref 1.8–7.3)
NEUTROPHILS RELATIVE PERCENT: 77.3 % (ref 43–80)
NEUTROPHILS RELATIVE PERCENT: 78.3 % (ref 43–80)
NEUTROPHILS RELATIVE PERCENT: 78.7 % (ref 43–80)
NEUTROPHILS RELATIVE PERCENT: 78.8 % (ref 43–80)
NEUTROPHILS RELATIVE PERCENT: 82.1 % (ref 43–80)
NEUTROPHILS RELATIVE PERCENT: 83.5 % (ref 43–80)
NEUTROPHILS RELATIVE PERCENT: 83.6 % (ref 43–80)
NEUTROPHILS RELATIVE PERCENT: 84.4 % (ref 43–80)
NEUTROPHILS RELATIVE PERCENT: 84.4 % (ref 43–80)
NEUTROPHILS RELATIVE PERCENT: 85.4 % (ref 43–80)
NEUTROPHILS RELATIVE PERCENT: 86.2 % (ref 43–80)
NEUTROPHILS RELATIVE PERCENT: 87.9 % (ref 43–80)
NEUTROPHILS RELATIVE PERCENT: 88 % (ref 43–80)
NEUTROPHILS RELATIVE PERCENT: 89.6 % (ref 43–80)
NEUTROPHILS RELATIVE PERCENT: 91.3 % (ref 43–80)
NEUTROPHILS RELATIVE PERCENT: 91.3 % (ref 43–80)
NEUTROPHILS RELATIVE PERCENT: 92.2 % (ref 43–80)
NEUTROPHILS RELATIVE PERCENT: 93 % (ref 43–80)
NEUTROPHILS RELATIVE PERCENT: 93.9 % (ref 43–80)
NEUTROPHILS RELATIVE PERCENT: 96.5 % (ref 43–80)
NEUTROPHILS RELATIVE PERCENT: 96.5 % (ref 43–80)
NEUTROPHILS RELATIVE PERCENT: 97.4 % (ref 43–80)
NEUTROPHILS RELATIVE PERCENT: 97.4 % (ref 43–80)
NITRITE, URINE: NEGATIVE
NITRITE, URINE: POSITIVE
O2 CONTENT: 10.5 ML/DL
O2 CONTENT: 11.2 ML/DL
O2 CONTENT: 11.3 ML/DL
O2 CONTENT: 11.4 ML/DL
O2 CONTENT: 12.1 ML/DL
O2 CONTENT: 12.5 ML/DL
O2 CONTENT: 12.9 ML/DL
O2 CONTENT: 13.4 ML/DL
O2 CONTENT: 13.5 ML/DL
O2 CONTENT: 13.5 ML/DL
O2 CONTENT: 13.6 ML/DL
O2 CONTENT: 13.8 ML/DL
O2 SATURATION: 90.9 % (ref 92–98.5)
O2 SATURATION: 93.4 % (ref 92–98.5)
O2 SATURATION: 98.1 % (ref 92–98.5)
O2 SATURATION: 98.3 % (ref 92–98.5)
O2 SATURATION: 98.3 % (ref 92–98.5)
O2 SATURATION: 98.4 % (ref 92–98.5)
O2 SATURATION: 98.5 % (ref 92–98.5)
O2 SATURATION: 98.5 % (ref 92–98.5)
O2 SATURATION: 99 % (ref 92–98.5)
O2 SATURATION: 99.3 % (ref 92–98.5)
O2 SATURATION: 99.5 % (ref 92–98.5)
O2 SATURATION: 99.7 % (ref 92–98.5)
O2HB: 90.4 % (ref 94–97)
O2HB: 92.7 % (ref 94–97)
O2HB: 97.1 % (ref 94–97)
O2HB: 97.2 % (ref 94–97)
O2HB: 97.6 % (ref 94–97)
O2HB: 97.7 % (ref 94–97)
O2HB: 97.8 % (ref 94–97)
O2HB: 97.8 % (ref 94–97)
O2HB: 98.2 % (ref 94–97)
O2HB: 98.6 % (ref 94–97)
O2HB: 98.8 % (ref 94–97)
O2HB: 99 % (ref 94–97)
OPERATOR ID: 1023
OPERATOR ID: 1874
OPERATOR ID: 2464
OPERATOR ID: 2464
OPERATOR ID: 2577
OPERATOR ID: 421
OPERATOR ID: 7874
OPERATOR ID: ABNORMAL
ORGANISM: ABNORMAL
OVALOCYTES: ABNORMAL
PATIENT TEMP: 37 C
PCO2: 24.4 MMHG (ref 35–45)
PCO2: 24.7 MMHG (ref 35–45)
PCO2: 25.2 MMHG (ref 35–45)
PCO2: 26.2 MMHG (ref 35–45)
PCO2: 27 MMHG (ref 35–45)
PCO2: 27.3 MMHG (ref 35–45)
PCO2: 28.7 MMHG (ref 35–45)
PCO2: 30.9 MMHG (ref 35–45)
PCO2: 31.9 MMHG (ref 35–45)
PCO2: 32.5 MMHG (ref 35–45)
PCO2: 34 MMHG (ref 35–45)
PCO2: 40.7 MMHG (ref 35–45)
PDW BLD-RTO: 14.3 FL (ref 11.5–15)
PDW BLD-RTO: 14.4 FL (ref 11.5–15)
PDW BLD-RTO: 14.5 FL (ref 11.5–15)
PDW BLD-RTO: 14.6 FL (ref 11.5–15)
PDW BLD-RTO: 14.7 FL (ref 11.5–15)
PDW BLD-RTO: 14.8 FL (ref 11.5–15)
PDW BLD-RTO: 14.9 FL (ref 11.5–15)
PDW BLD-RTO: 15.1 FL (ref 11.5–15)
PDW BLD-RTO: 15.2 FL (ref 11.5–15)
PDW BLD-RTO: 15.3 FL (ref 11.5–15)
PDW BLD-RTO: 15.4 FL (ref 11.5–15)
PDW BLD-RTO: 15.5 FL (ref 11.5–15)
PDW BLD-RTO: 15.5 FL (ref 11.5–15)
PDW BLD-RTO: 16.6 FL (ref 11.5–15)
PDW BLD-RTO: 16.7 FL (ref 11.5–15)
PDW BLD-RTO: 16.8 FL (ref 11.5–15)
PDW BLD-RTO: 16.9 FL (ref 11.5–15)
PDW BLD-RTO: 17 FL (ref 11.5–15)
PEEP/CPAP: 5 CMH2O
PEEP/CPAP: 7 CMH2O
PEEP/CPAP: 7 CMH2O
PERFORMED ON: ABNORMAL
PFO2: 1.32 MMHG/%
PFO2: 1.51 MMHG/%
PFO2: 1.89 MMHG/%
PFO2: 2.14 MMHG/%
PFO2: 2.58 MMHG/%
PFO2: 2.6 MMHG/%
PFO2: 2.88 MMHG/%
PFO2: 2.99 MMHG/%
PFO2: 3.16 MMHG/%
PFO2: 3.63 MMHG/%
PFO2: 3.77 MMHG/%
PH BLOOD GAS: 7.21 (ref 7.35–7.45)
PH BLOOD GAS: 7.25 (ref 7.35–7.45)
PH BLOOD GAS: 7.26 (ref 7.35–7.45)
PH BLOOD GAS: 7.3 (ref 7.35–7.45)
PH BLOOD GAS: 7.3 (ref 7.35–7.45)
PH BLOOD GAS: 7.31 (ref 7.35–7.45)
PH BLOOD GAS: 7.34 (ref 7.35–7.45)
PH BLOOD GAS: 7.38 (ref 7.35–7.45)
PH BLOOD GAS: 7.39 (ref 7.35–7.45)
PH BLOOD GAS: 7.4 (ref 7.35–7.45)
PH BLOOD GAS: 7.41 (ref 7.35–7.45)
PH BLOOD GAS: 7.48 (ref 7.35–7.45)
PH UA: 5 (ref 5–9)
PH UA: 5 (ref 5–9)
PH UA: 5.5 (ref 5–9)
PH UA: 6 (ref 5–9)
PHOSPHORUS: 3.2 MG/DL (ref 2.5–4.5)
PHOSPHORUS: 3.3 MG/DL (ref 2.5–4.5)
PHOSPHORUS: 3.9 MG/DL (ref 2.5–4.5)
PHOSPHORUS: 4.4 MG/DL (ref 2.5–4.5)
PLATELET # BLD: 108 E9/L (ref 130–450)
PLATELET # BLD: 110 E9/L (ref 130–450)
PLATELET # BLD: 117 E9/L (ref 130–450)
PLATELET # BLD: 119 E9/L (ref 130–450)
PLATELET # BLD: 131 E9/L (ref 130–450)
PLATELET # BLD: 134 E9/L (ref 130–450)
PLATELET # BLD: 136 E9/L (ref 130–450)
PLATELET # BLD: 139 E9/L (ref 130–450)
PLATELET # BLD: 140 E9/L (ref 130–450)
PLATELET # BLD: 145 E9/L (ref 130–450)
PLATELET # BLD: 162 E9/L (ref 130–450)
PLATELET # BLD: 163 E9/L (ref 130–450)
PLATELET # BLD: 176 E9/L (ref 130–450)
PLATELET # BLD: 180 E9/L (ref 130–450)
PLATELET # BLD: 180 E9/L (ref 130–450)
PLATELET # BLD: 181 E9/L (ref 130–450)
PLATELET # BLD: 186 E9/L (ref 130–450)
PLATELET # BLD: 195 E9/L (ref 130–450)
PLATELET # BLD: 195 E9/L (ref 130–450)
PLATELET # BLD: 197 E9/L (ref 130–450)
PLATELET # BLD: 209 E9/L (ref 130–450)
PLATELET # BLD: 234 E9/L (ref 130–450)
PLATELET # BLD: 236 E9/L (ref 130–450)
PLATELET # BLD: 243 E9/L (ref 130–450)
PLATELET # BLD: 261 E9/L (ref 130–450)
PMV BLD AUTO: 10.4 FL (ref 7–12)
PMV BLD AUTO: 10.8 FL (ref 7–12)
PMV BLD AUTO: 10.9 FL (ref 7–12)
PMV BLD AUTO: 11.2 FL (ref 7–12)
PMV BLD AUTO: 11.3 FL (ref 7–12)
PMV BLD AUTO: 11.5 FL (ref 7–12)
PMV BLD AUTO: 11.5 FL (ref 7–12)
PMV BLD AUTO: 11.6 FL (ref 7–12)
PMV BLD AUTO: 11.6 FL (ref 7–12)
PMV BLD AUTO: 11.8 FL (ref 7–12)
PMV BLD AUTO: 12 FL (ref 7–12)
PMV BLD AUTO: 12 FL (ref 7–12)
PMV BLD AUTO: 12.1 FL (ref 7–12)
PMV BLD AUTO: 12.4 FL (ref 7–12)
PMV BLD AUTO: 12.5 FL (ref 7–12)
PMV BLD AUTO: 12.5 FL (ref 7–12)
PO2: 119.4 MMHG (ref 60–100)
PO2: 129.2 MMHG (ref 60–100)
PO2: 129.8 MMHG (ref 60–100)
PO2: 129.9 MMHG (ref 60–100)
PO2: 132.5 MMHG (ref 60–100)
PO2: 145.3 MMHG (ref 60–100)
PO2: 189.4 MMHG (ref 60–100)
PO2: 213.7 MMHG (ref 60–100)
PO2: 282.5 MMHG (ref 60–100)
PO2: 376.9 MMHG (ref 60–100)
PO2: 65.9 MMHG (ref 60–100)
PO2: 67.9 MMHG (ref 60–100)
POC CHLORIDE: 104 MMOL/L (ref 100–108)
POC CHLORIDE: 104 MMOL/L (ref 100–108)
POC CHLORIDE: 95 MMOL/L (ref 100–108)
POC CHLORIDE: 96 MMOL/L (ref 100–108)
POC CREATININE: 1.5 MG/DL (ref 0.7–1.2)
POC CREATININE: 2.1 MG/DL (ref 0.7–1.2)
POC CREATININE: 2.1 MG/DL (ref 0.7–1.2)
POC CREATININE: 2.6 MG/DL (ref 0.7–1.2)
POC POTASSIUM: 4.6 MMOL/L (ref 3.5–5)
POC POTASSIUM: 5.2 MMOL/L (ref 3.5–5)
POC POTASSIUM: 5.7 MMOL/L (ref 3.5–5)
POC POTASSIUM: 7.8 MMOL/L (ref 3.5–5)
POC SODIUM: 131 MMOL/L (ref 132–146)
POC SODIUM: 134 MMOL/L (ref 132–146)
POC SODIUM: 135 MMOL/L (ref 132–146)
POC SODIUM: 140 MMOL/L (ref 132–146)
POIKILOCYTES: ABNORMAL
POLYCHROMASIA: ABNORMAL
POTASSIUM REFLEX MAGNESIUM: 3.6 MMOL/L (ref 3.5–5)
POTASSIUM REFLEX MAGNESIUM: 3.7 MMOL/L (ref 3.5–5)
POTASSIUM REFLEX MAGNESIUM: 3.7 MMOL/L (ref 3.5–5)
POTASSIUM REFLEX MAGNESIUM: 4.2 MMOL/L (ref 3.5–5)
POTASSIUM REFLEX MAGNESIUM: 4.2 MMOL/L (ref 3.5–5)
POTASSIUM REFLEX MAGNESIUM: 4.3 MMOL/L (ref 3.5–5)
POTASSIUM REFLEX MAGNESIUM: 4.7 MMOL/L (ref 3.5–5)
POTASSIUM REFLEX MAGNESIUM: 4.9 MMOL/L (ref 3.5–5)
POTASSIUM REFLEX MAGNESIUM: 5 MMOL/L (ref 3.5–5)
POTASSIUM REFLEX MAGNESIUM: 5.1 MMOL/L (ref 3.5–5)
POTASSIUM REFLEX MAGNESIUM: 5.6 MMOL/L (ref 3.5–5)
POTASSIUM REFLEX MAGNESIUM: 5.6 MMOL/L (ref 3.5–5)
POTASSIUM REFLEX MAGNESIUM: 6.1 MMOL/L (ref 3.5–5)
POTASSIUM SERPL-SCNC: 3.2 MMOL/L (ref 3.5–5)
POTASSIUM SERPL-SCNC: 3.3 MMOL/L (ref 3.5–5)
POTASSIUM SERPL-SCNC: 3.5 MMOL/L (ref 3.5–5)
POTASSIUM SERPL-SCNC: 3.6 MMOL/L (ref 3.5–5)
POTASSIUM SERPL-SCNC: 3.6 MMOL/L (ref 3.5–5)
POTASSIUM SERPL-SCNC: 3.7 MMOL/L (ref 3.5–5)
POTASSIUM SERPL-SCNC: 3.8 MMOL/L (ref 3.5–5)
POTASSIUM SERPL-SCNC: 3.9 MMOL/L (ref 3.5–5)
POTASSIUM SERPL-SCNC: 3.9 MMOL/L (ref 3.5–5)
POTASSIUM SERPL-SCNC: 4 MMOL/L (ref 3.5–5)
POTASSIUM SERPL-SCNC: 4 MMOL/L (ref 3.5–5)
POTASSIUM SERPL-SCNC: 4.1 MMOL/L (ref 3.5–5)
POTASSIUM SERPL-SCNC: 4.1 MMOL/L (ref 3.5–5)
POTASSIUM SERPL-SCNC: 4.2 MMOL/L (ref 3.5–5)
POTASSIUM SERPL-SCNC: 4.3 MMOL/L (ref 3.5–5)
POTASSIUM SERPL-SCNC: 4.4 MMOL/L (ref 3.5–5)
POTASSIUM SERPL-SCNC: 4.5 MMOL/L (ref 3.5–5)
POTASSIUM SERPL-SCNC: 4.5 MMOL/L (ref 3.5–5)
POTASSIUM SERPL-SCNC: 4.6 MMOL/L (ref 3.5–5)
POTASSIUM SERPL-SCNC: 4.7 MMOL/L (ref 3.5–5)
POTASSIUM SERPL-SCNC: 5.1 MMOL/L (ref 3.5–5)
POTASSIUM SERPL-SCNC: 5.7 MMOL/L (ref 3.5–5)
POTASSIUM SERPL-SCNC: 6.1 MMOL/L (ref 3.5–5)
POTASSIUM SERPL-SCNC: 6.1 MMOL/L (ref 3.5–5)
POTASSIUM, UR: 62.7 MMOL/L
PROCALCITONIN: 0.61 NG/ML (ref 0–0.08)
PROCALCITONIN: 94.44 NG/ML (ref 0–0.08)
PROTEIN UA: 30 MG/DL
PROTEIN UA: >=300 MG/DL
PROTEIN UA: ABNORMAL MG/DL
PROTEIN UA: NEGATIVE MG/DL
RBC # BLD: 2.1 E12/L (ref 3.8–5.8)
RBC # BLD: 2.11 E12/L (ref 3.8–5.8)
RBC # BLD: 2.4 E12/L (ref 3.8–5.8)
RBC # BLD: 2.45 E12/L (ref 3.8–5.8)
RBC # BLD: 2.5 E12/L (ref 3.8–5.8)
RBC # BLD: 2.6 E12/L (ref 3.8–5.8)
RBC # BLD: 2.65 E12/L (ref 3.8–5.8)
RBC # BLD: 2.7 E12/L (ref 3.8–5.8)
RBC # BLD: 2.71 E12/L (ref 3.8–5.8)
RBC # BLD: 2.72 E12/L (ref 3.8–5.8)
RBC # BLD: 2.75 E12/L (ref 3.8–5.8)
RBC # BLD: 2.78 E12/L (ref 3.8–5.8)
RBC # BLD: 2.78 E12/L (ref 3.8–5.8)
RBC # BLD: 2.82 E12/L (ref 3.8–5.8)
RBC # BLD: 2.84 E12/L (ref 3.8–5.8)
RBC # BLD: 2.86 E12/L (ref 3.8–5.8)
RBC # BLD: 2.91 E12/L (ref 3.8–5.8)
RBC # BLD: 2.98 E12/L (ref 3.8–5.8)
RBC # BLD: 3.01 E12/L (ref 3.8–5.8)
RBC # BLD: 3.05 E12/L (ref 3.8–5.8)
RBC # BLD: 3.15 E12/L (ref 3.8–5.8)
RBC # BLD: 3.17 E12/L (ref 3.8–5.8)
RBC # BLD: 3.27 E12/L (ref 3.8–5.8)
RBC # BLD: 3.31 E12/L (ref 3.8–5.8)
RBC # BLD: 3.85 E12/L (ref 3.8–5.8)
RBC # BLD: NORMAL 10*6/UL
RBC UA: ABNORMAL /HPF (ref 0–2)
RBC UA: NORMAL /HPF (ref 0–2)
REASON FOR REJECTION: NORMAL
REJECTED TEST: NORMAL
RI(T): 0.67
RI(T): 1
RI(T): 1.04
RI(T): 1.16
RI(T): 1.37
RI(T): 1.39
RI(T): 2.1
RI(T): 2.44
RI(T): 3.1
RI(T): 3.76
RI(T): 72 %
RR MECHANICAL: 12 B/MIN
RR MECHANICAL: 16 B/MIN
RR MECHANICAL: 16 B/MIN
RR MECHANICAL: 18 B/MIN
SCHISTOCYTES: ABNORMAL
SODIUM BLD-SCNC: 131 MMOL/L (ref 132–146)
SODIUM BLD-SCNC: 132 MMOL/L (ref 132–146)
SODIUM BLD-SCNC: 132 MMOL/L (ref 132–146)
SODIUM BLD-SCNC: 134 MMOL/L (ref 132–146)
SODIUM BLD-SCNC: 135 MMOL/L (ref 132–146)
SODIUM BLD-SCNC: 135 MMOL/L (ref 132–146)
SODIUM BLD-SCNC: 136 MMOL/L (ref 132–146)
SODIUM BLD-SCNC: 136 MMOL/L (ref 132–146)
SODIUM BLD-SCNC: 137 MMOL/L (ref 132–146)
SODIUM BLD-SCNC: 138 MMOL/L (ref 132–146)
SODIUM BLD-SCNC: 139 MMOL/L (ref 132–146)
SODIUM BLD-SCNC: 140 MMOL/L (ref 132–146)
SODIUM BLD-SCNC: 141 MMOL/L (ref 132–146)
SODIUM BLD-SCNC: 142 MMOL/L (ref 132–146)
SODIUM BLD-SCNC: 142 MMOL/L (ref 132–146)
SODIUM BLD-SCNC: 143 MMOL/L (ref 132–146)
SODIUM BLD-SCNC: 143 MMOL/L (ref 132–146)
SODIUM BLD-SCNC: 144 MMOL/L (ref 132–146)
SODIUM BLD-SCNC: 144 MMOL/L (ref 132–146)
SODIUM BLD-SCNC: 145 MMOL/L (ref 132–146)
SODIUM BLD-SCNC: 146 MMOL/L (ref 132–146)
SODIUM BLD-SCNC: 147 MMOL/L (ref 132–146)
SODIUM BLD-SCNC: 148 MMOL/L (ref 132–146)
SODIUM BLD-SCNC: 150 MMOL/L (ref 132–146)
SODIUM BLD-SCNC: 151 MMOL/L (ref 132–146)
SODIUM BLD-SCNC: 153 MMOL/L (ref 132–146)
SODIUM BLD-SCNC: 154 MMOL/L (ref 132–146)
SODIUM BLD-SCNC: 155 MMOL/L (ref 132–146)
SODIUM BLD-SCNC: 155 MMOL/L (ref 132–146)
SODIUM BLD-SCNC: 158 MMOL/L (ref 132–146)
SODIUM BLD-SCNC: 159 MMOL/L (ref 132–146)
SODIUM BLD-SCNC: 161 MMOL/L (ref 132–146)
SODIUM URINE: 28 MMOL/L
SOURCE, BLOOD GAS: ABNORMAL
SPECIFIC GRAVITY UA: 1.02 (ref 1–1.03)
SPECIFIC GRAVITY UA: 1.02 (ref 1–1.03)
SPECIFIC GRAVITY UA: <=1.005 (ref 1–1.03)
SPECIFIC GRAVITY UA: <=1.005 (ref 1–1.03)
TEAR DROP CELLS: ABNORMAL
TEAR DROP CELLS: ABNORMAL
THB: 7.3 G/DL (ref 11.5–16.5)
THB: 8 G/DL (ref 11.5–16.5)
THB: 8.1 G/DL (ref 11.5–16.5)
THB: 8.5 G/DL (ref 11.5–16.5)
THB: 8.6 G/DL (ref 11.5–16.5)
THB: 8.6 G/DL (ref 11.5–16.5)
THB: 9.3 G/DL (ref 11.5–16.5)
THB: 9.4 G/DL (ref 11.5–16.5)
THB: 9.6 G/DL (ref 11.5–16.5)
THB: 9.6 G/DL (ref 11.5–16.5)
THB: 9.7 G/DL (ref 11.5–16.5)
THB: 9.8 G/DL (ref 11.5–16.5)
TIME ANALYZED: 126
TIME ANALYZED: 1303
TIME ANALYZED: 159
TIME ANALYZED: 1907
TIME ANALYZED: 2246
TIME ANALYZED: 434
TIME ANALYZED: 452
TIME ANALYZED: 525
TIME ANALYZED: 531
TIME ANALYZED: 544
TIME ANALYZED: 602
TIME ANALYZED: 638
TOTAL CK: 516 U/L (ref 20–200)
TOTAL PROTEIN: 5.2 G/DL (ref 6.4–8.3)
TOTAL PROTEIN: 5.9 G/DL (ref 6.4–8.3)
TOTAL PROTEIN: 6.5 G/DL (ref 6.4–8.3)
TOTAL PROTEIN: 6.9 G/DL (ref 6.4–8.3)
TOTAL PROTEIN: 7.1 G/DL (ref 6.4–8.3)
TOXIC GRANULATION: ABNORMAL
TOXIC GRANULATION: ABNORMAL
TROPONIN: 0.02 NG/ML (ref 0–0.03)
TROPONIN: 0.02 NG/ML (ref 0–0.03)
TROPONIN: 0.21 NG/ML (ref 0–0.03)
TROPONIN: <0.01 NG/ML (ref 0–0.03)
UREA NITROGEN, UR: 359 MG/DL (ref 800–1666)
URINE CULTURE, ROUTINE: ABNORMAL
URINE CULTURE, ROUTINE: NORMAL
UROBILINOGEN, URINE: 0.2 E.U./DL
UROBILINOGEN, URINE: 1 E.U./DL
VT MECHANICAL: 400 ML
VT MECHANICAL: 500 ML
WBC # BLD: 10.8 E9/L (ref 4.5–11.5)
WBC # BLD: 10.9 E9/L (ref 4.5–11.5)
WBC # BLD: 11 E9/L (ref 4.5–11.5)
WBC # BLD: 11.2 E9/L (ref 4.5–11.5)
WBC # BLD: 12.9 E9/L (ref 4.5–11.5)
WBC # BLD: 13.3 E9/L (ref 4.5–11.5)
WBC # BLD: 14.4 E9/L (ref 4.5–11.5)
WBC # BLD: 17.4 E9/L (ref 4.5–11.5)
WBC # BLD: 18.8 E9/L (ref 4.5–11.5)
WBC # BLD: 19.1 E9/L (ref 4.5–11.5)
WBC # BLD: 25.3 E9/L (ref 4.5–11.5)
WBC # BLD: 27.6 E9/L (ref 4.5–11.5)
WBC # BLD: 39.2 E9/L (ref 4.5–11.5)
WBC # BLD: 6.1 E9/L (ref 4.5–11.5)
WBC # BLD: 60.5 E9/L (ref 4.5–11.5)
WBC # BLD: 7.7 E9/L (ref 4.5–11.5)
WBC # BLD: 8.1 E9/L (ref 4.5–11.5)
WBC # BLD: 8.3 E9/L (ref 4.5–11.5)
WBC # BLD: 8.4 E9/L (ref 4.5–11.5)
WBC # BLD: 8.9 E9/L (ref 4.5–11.5)
WBC # BLD: 9.2 E9/L (ref 4.5–11.5)
WBC # BLD: 9.5 E9/L (ref 4.5–11.5)
WBC # BLD: 9.6 E9/L (ref 4.5–11.5)
WBC # BLD: 9.8 E9/L (ref 4.5–11.5)
WBC # BLD: 9.9 E9/L (ref 4.5–11.5)
WBC UA: >20 /HPF (ref 0–5)
WBC UA: ABNORMAL /HPF (ref 0–5)
WBC UA: ABNORMAL /HPF (ref 0–5)
WBC UA: NORMAL /HPF (ref 0–5)

## 2019-01-01 PROCEDURE — 6360000002 HC RX W HCPCS: Performed by: INTERNAL MEDICINE

## 2019-01-01 PROCEDURE — C9113 INJ PANTOPRAZOLE SODIUM, VIA: HCPCS | Performed by: INTERNAL MEDICINE

## 2019-01-01 PROCEDURE — 6370000000 HC RX 637 (ALT 250 FOR IP): Performed by: SURGERY

## 2019-01-01 PROCEDURE — 71045 X-RAY EXAM CHEST 1 VIEW: CPT

## 2019-01-01 PROCEDURE — 84484 ASSAY OF TROPONIN QUANT: CPT

## 2019-01-01 PROCEDURE — 87449 NOS EACH ORGANISM AG IA: CPT

## 2019-01-01 PROCEDURE — 94003 VENT MGMT INPAT SUBQ DAY: CPT

## 2019-01-01 PROCEDURE — 94002 VENT MGMT INPAT INIT DAY: CPT

## 2019-01-01 PROCEDURE — 7100000001 HC PACU RECOVERY - ADDTL 15 MIN: Performed by: SURGERY

## 2019-01-01 PROCEDURE — 36415 COLL VENOUS BLD VENIPUNCTURE: CPT

## 2019-01-01 PROCEDURE — 1200000000 HC SEMI PRIVATE

## 2019-01-01 PROCEDURE — 82805 BLOOD GASES W/O2 SATURATION: CPT

## 2019-01-01 PROCEDURE — 87633 RESP VIRUS 12-25 TARGETS: CPT

## 2019-01-01 PROCEDURE — 82565 ASSAY OF CREATININE: CPT

## 2019-01-01 PROCEDURE — 2140000000 HC CCU INTERMEDIATE R&B

## 2019-01-01 PROCEDURE — 92526 ORAL FUNCTION THERAPY: CPT

## 2019-01-01 PROCEDURE — 6370000000 HC RX 637 (ALT 250 FOR IP): Performed by: INTERNAL MEDICINE

## 2019-01-01 PROCEDURE — 80048 BASIC METABOLIC PNL TOTAL CA: CPT

## 2019-01-01 PROCEDURE — 6370000000 HC RX 637 (ALT 250 FOR IP): Performed by: STUDENT IN AN ORGANIZED HEALTH CARE EDUCATION/TRAINING PROGRAM

## 2019-01-01 PROCEDURE — 2500000003 HC RX 250 WO HCPCS: Performed by: INTERNAL MEDICINE

## 2019-01-01 PROCEDURE — 51702 INSERT TEMP BLADDER CATH: CPT

## 2019-01-01 PROCEDURE — 99291 CRITICAL CARE FIRST HOUR: CPT | Performed by: INTERNAL MEDICINE

## 2019-01-01 PROCEDURE — 82962 GLUCOSE BLOOD TEST: CPT

## 2019-01-01 PROCEDURE — 6370000000 HC RX 637 (ALT 250 FOR IP): Performed by: SPECIALIST

## 2019-01-01 PROCEDURE — 6360000002 HC RX W HCPCS: Performed by: STUDENT IN AN ORGANIZED HEALTH CARE EDUCATION/TRAINING PROGRAM

## 2019-01-01 PROCEDURE — 82330 ASSAY OF CALCIUM: CPT

## 2019-01-01 PROCEDURE — 2580000003 HC RX 258: Performed by: INTERNAL MEDICINE

## 2019-01-01 PROCEDURE — 36620 INSERTION CATHETER ARTERY: CPT

## 2019-01-01 PROCEDURE — 6360000004 HC RX CONTRAST MEDICATION: Performed by: RADIOLOGY

## 2019-01-01 PROCEDURE — 2580000003 HC RX 258: Performed by: SPECIALIST

## 2019-01-01 PROCEDURE — 97530 THERAPEUTIC ACTIVITIES: CPT

## 2019-01-01 PROCEDURE — 84132 ASSAY OF SERUM POTASSIUM: CPT

## 2019-01-01 PROCEDURE — 2709999900 HC NON-CHARGEABLE SUPPLY: Performed by: SURGERY

## 2019-01-01 PROCEDURE — 2580000003 HC RX 258: Performed by: SURGERY

## 2019-01-01 PROCEDURE — 6360000002 HC RX W HCPCS: Performed by: SURGERY

## 2019-01-01 PROCEDURE — 87486 CHLMYD PNEUM DNA AMP PROBE: CPT

## 2019-01-01 PROCEDURE — 2500000003 HC RX 250 WO HCPCS: Performed by: STUDENT IN AN ORGANIZED HEALTH CARE EDUCATION/TRAINING PROGRAM

## 2019-01-01 PROCEDURE — 96360 HYDRATION IV INFUSION INIT: CPT

## 2019-01-01 PROCEDURE — 86901 BLOOD TYPING SEROLOGIC RH(D): CPT

## 2019-01-01 PROCEDURE — A9552 F18 FDG: HCPCS | Performed by: RADIOLOGY

## 2019-01-01 PROCEDURE — 74176 CT ABD & PELVIS W/O CONTRAST: CPT

## 2019-01-01 PROCEDURE — 87040 BLOOD CULTURE FOR BACTERIA: CPT

## 2019-01-01 PROCEDURE — C9113 INJ PANTOPRAZOLE SODIUM, VIA: HCPCS | Performed by: SURGERY

## 2019-01-01 PROCEDURE — 2000000000 HC ICU R&B

## 2019-01-01 PROCEDURE — 85018 HEMOGLOBIN: CPT

## 2019-01-01 PROCEDURE — 6360000002 HC RX W HCPCS: Performed by: SPECIALIST

## 2019-01-01 PROCEDURE — 83605 ASSAY OF LACTIC ACID: CPT

## 2019-01-01 PROCEDURE — P9016 RBC LEUKOCYTES REDUCED: HCPCS

## 2019-01-01 PROCEDURE — 99999 PR OFFICE/OUTPT VISIT,PROCEDURE ONLY: CPT | Performed by: RADIOLOGY

## 2019-01-01 PROCEDURE — 99231 SBSQ HOSP IP/OBS SF/LOW 25: CPT | Performed by: CLINICAL NURSE SPECIALIST

## 2019-01-01 PROCEDURE — 99231 SBSQ HOSP IP/OBS SF/LOW 25: CPT | Performed by: NURSE PRACTITIONER

## 2019-01-01 PROCEDURE — 0BH17EZ INSERTION OF ENDOTRACHEAL AIRWAY INTO TRACHEA, VIA NATURAL OR ARTIFICIAL OPENING: ICD-10-PCS | Performed by: INTERNAL MEDICINE

## 2019-01-01 PROCEDURE — 77386 HC NTSTY MODUL RAD TX DLVR CPLX: CPT | Performed by: RADIOLOGY

## 2019-01-01 PROCEDURE — 94660 CPAP INITIATION&MGMT: CPT

## 2019-01-01 PROCEDURE — 77336 RADIATION PHYSICS CONSULT: CPT | Performed by: RADIOLOGY

## 2019-01-01 PROCEDURE — 85025 COMPLETE CBC W/AUTO DIFF WBC: CPT

## 2019-01-01 PROCEDURE — 80076 HEPATIC FUNCTION PANEL: CPT

## 2019-01-01 PROCEDURE — 74018 RADEX ABDOMEN 1 VIEW: CPT

## 2019-01-01 PROCEDURE — 81001 URINALYSIS AUTO W/SCOPE: CPT

## 2019-01-01 PROCEDURE — 97535 SELF CARE MNGMENT TRAINING: CPT

## 2019-01-01 PROCEDURE — 3609018000 HC EGD ESOPHAGOGASTRODUODENOSCOPY PEG TUBE INSERTION: Performed by: SURGERY

## 2019-01-01 PROCEDURE — 99213 OFFICE O/P EST LOW 20 MIN: CPT

## 2019-01-01 PROCEDURE — 99285 EMERGENCY DEPT VISIT HI MDM: CPT

## 2019-01-01 PROCEDURE — 93306 TTE W/DOPPLER COMPLETE: CPT

## 2019-01-01 PROCEDURE — 96366 THER/PROPH/DIAG IV INF ADDON: CPT

## 2019-01-01 PROCEDURE — 82040 ASSAY OF SERUM ALBUMIN: CPT

## 2019-01-01 PROCEDURE — 80053 COMPREHEN METABOLIC PANEL: CPT

## 2019-01-01 PROCEDURE — 3430000000 HC RX DIAGNOSTIC RADIOPHARMACEUTICAL: Performed by: RADIOLOGY

## 2019-01-01 PROCEDURE — 77334 RADIATION TREATMENT AID(S): CPT

## 2019-01-01 PROCEDURE — 97162 PT EVAL MOD COMPLEX 30 MIN: CPT

## 2019-01-01 PROCEDURE — 94799 UNLISTED PULMONARY SVC/PX: CPT

## 2019-01-01 PROCEDURE — 99215 OFFICE O/P EST HI 40 MIN: CPT | Performed by: RADIOLOGY

## 2019-01-01 PROCEDURE — 96365 THER/PROPH/DIAG IV INF INIT: CPT

## 2019-01-01 PROCEDURE — 2500000003 HC RX 250 WO HCPCS: Performed by: PREVENTIVE MEDICINE

## 2019-01-01 PROCEDURE — 37799 UNLISTED PX VASCULAR SURGERY: CPT

## 2019-01-01 PROCEDURE — 84540 ASSAY OF URINE/UREA-N: CPT

## 2019-01-01 PROCEDURE — 84100 ASSAY OF PHOSPHORUS: CPT

## 2019-01-01 PROCEDURE — 2500000003 HC RX 250 WO HCPCS: Performed by: PHYSICIAN ASSISTANT

## 2019-01-01 PROCEDURE — 2500000003 HC RX 250 WO HCPCS

## 2019-01-01 PROCEDURE — 6360000002 HC RX W HCPCS: Performed by: EMERGENCY MEDICINE

## 2019-01-01 PROCEDURE — 96374 THER/PROPH/DIAG INJ IV PUSH: CPT

## 2019-01-01 PROCEDURE — 93010 ELECTROCARDIOGRAM REPORT: CPT | Performed by: INTERNAL MEDICINE

## 2019-01-01 PROCEDURE — 99213 OFFICE O/P EST LOW 20 MIN: CPT | Performed by: RADIOLOGY

## 2019-01-01 PROCEDURE — 99212 OFFICE O/P EST SF 10 MIN: CPT

## 2019-01-01 PROCEDURE — 99223 1ST HOSP IP/OBS HIGH 75: CPT | Performed by: CLINICAL NURSE SPECIALIST

## 2019-01-01 PROCEDURE — 82947 ASSAY GLUCOSE BLOOD QUANT: CPT

## 2019-01-01 PROCEDURE — 80162 ASSAY OF DIGOXIN TOTAL: CPT

## 2019-01-01 PROCEDURE — 93005 ELECTROCARDIOGRAM TRACING: CPT | Performed by: INTERNAL MEDICINE

## 2019-01-01 PROCEDURE — 84300 ASSAY OF URINE SODIUM: CPT

## 2019-01-01 PROCEDURE — 85014 HEMATOCRIT: CPT

## 2019-01-01 PROCEDURE — 77301 RADIOTHERAPY DOSE PLAN IMRT: CPT | Performed by: RADIOLOGY

## 2019-01-01 PROCEDURE — 87077 CULTURE AEROBIC IDENTIFY: CPT

## 2019-01-01 PROCEDURE — 82436 ASSAY OF URINE CHLORIDE: CPT

## 2019-01-01 PROCEDURE — 2580000003 HC RX 258: Performed by: STUDENT IN AN ORGANIZED HEALTH CARE EDUCATION/TRAINING PROGRAM

## 2019-01-01 PROCEDURE — 2500000003 HC RX 250 WO HCPCS: Performed by: NURSE PRACTITIONER

## 2019-01-01 PROCEDURE — 2500000003 HC RX 250 WO HCPCS: Performed by: ANESTHESIOLOGY

## 2019-01-01 PROCEDURE — 86900 BLOOD TYPING SEROLOGIC ABO: CPT

## 2019-01-01 PROCEDURE — 82435 ASSAY OF BLOOD CHLORIDE: CPT

## 2019-01-01 PROCEDURE — 71250 CT THORAX DX C-: CPT

## 2019-01-01 PROCEDURE — 83735 ASSAY OF MAGNESIUM: CPT

## 2019-01-01 PROCEDURE — 76770 US EXAM ABDO BACK WALL COMP: CPT

## 2019-01-01 PROCEDURE — 7100000000 HC PACU RECOVERY - FIRST 15 MIN: Performed by: SURGERY

## 2019-01-01 PROCEDURE — 2500000003 HC RX 250 WO HCPCS: Performed by: SURGERY

## 2019-01-01 PROCEDURE — 77300 RADIATION THERAPY DOSE PLAN: CPT | Performed by: RADIOLOGY

## 2019-01-01 PROCEDURE — 82977 ASSAY OF GGT: CPT

## 2019-01-01 PROCEDURE — 97110 THERAPEUTIC EXERCISES: CPT

## 2019-01-01 PROCEDURE — 78815 PET IMAGE W/CT SKULL-THIGH: CPT

## 2019-01-01 PROCEDURE — 87581 M.PNEUMON DNA AMP PROBE: CPT

## 2019-01-01 PROCEDURE — 87324 CLOSTRIDIUM AG IA: CPT

## 2019-01-01 PROCEDURE — 92611 MOTION FLUOROSCOPY/SWALLOW: CPT | Performed by: SPEECH-LANGUAGE PATHOLOGIST

## 2019-01-01 PROCEDURE — 99291 CRITICAL CARE FIRST HOUR: CPT

## 2019-01-01 PROCEDURE — 96375 TX/PRO/DX INJ NEW DRUG ADDON: CPT

## 2019-01-01 PROCEDURE — 70450 CT HEAD/BRAIN W/O DYE: CPT

## 2019-01-01 PROCEDURE — 93005 ELECTROCARDIOGRAM TRACING: CPT | Performed by: STUDENT IN AN ORGANIZED HEALTH CARE EDUCATION/TRAINING PROGRAM

## 2019-01-01 PROCEDURE — 99221 1ST HOSP IP/OBS SF/LOW 40: CPT | Performed by: EMERGENCY MEDICINE

## 2019-01-01 PROCEDURE — 2580000003 HC RX 258: Performed by: NURSE PRACTITIONER

## 2019-01-01 PROCEDURE — 85027 COMPLETE CBC AUTOMATED: CPT

## 2019-01-01 PROCEDURE — 82550 ASSAY OF CK (CPK): CPT

## 2019-01-01 PROCEDURE — 2580000003 HC RX 258: Performed by: RADIOLOGY

## 2019-01-01 PROCEDURE — 2580000003 HC RX 258

## 2019-01-01 PROCEDURE — 97166 OT EVAL MOD COMPLEX 45 MIN: CPT

## 2019-01-01 PROCEDURE — 6370000000 HC RX 637 (ALT 250 FOR IP): Performed by: NURSE PRACTITIONER

## 2019-01-01 PROCEDURE — 84145 PROCALCITONIN (PCT): CPT

## 2019-01-01 PROCEDURE — 0DH63UZ INSERTION OF FEEDING DEVICE INTO STOMACH, PERCUTANEOUS APPROACH: ICD-10-PCS | Performed by: INTERNAL MEDICINE

## 2019-01-01 PROCEDURE — 77293 RESPIRATOR MOTION MGMT SIMUL: CPT | Performed by: RADIOLOGY

## 2019-01-01 PROCEDURE — 02HV33Z INSERTION OF INFUSION DEVICE INTO SUPERIOR VENA CAVA, PERCUTANEOUS APPROACH: ICD-10-PCS | Performed by: INTERNAL MEDICINE

## 2019-01-01 PROCEDURE — 87088 URINE BACTERIA CULTURE: CPT

## 2019-01-01 PROCEDURE — 6360000002 HC RX W HCPCS

## 2019-01-01 PROCEDURE — 3700000001 HC ADD 15 MINUTES (ANESTHESIA): Performed by: SURGERY

## 2019-01-01 PROCEDURE — 96367 TX/PROPH/DG ADDL SEQ IV INF: CPT

## 2019-01-01 PROCEDURE — 74230 X-RAY XM SWLNG FUNCJ C+: CPT

## 2019-01-01 PROCEDURE — 99232 SBSQ HOSP IP/OBS MODERATE 35: CPT | Performed by: EMERGENCY MEDICINE

## 2019-01-01 PROCEDURE — 2580000003 HC RX 258: Performed by: EMERGENCY MEDICINE

## 2019-01-01 PROCEDURE — 5A1955Z RESPIRATORY VENTILATION, GREATER THAN 96 CONSECUTIVE HOURS: ICD-10-PCS | Performed by: INTERNAL MEDICINE

## 2019-01-01 PROCEDURE — 73620 X-RAY EXAM OF FOOT: CPT

## 2019-01-01 PROCEDURE — 71260 CT THORAX DX C+: CPT

## 2019-01-01 PROCEDURE — 2500000003 HC RX 250 WO HCPCS: Performed by: EMERGENCY MEDICINE

## 2019-01-01 PROCEDURE — 87081 CULTURE SCREEN ONLY: CPT

## 2019-01-01 PROCEDURE — 86923 COMPATIBILITY TEST ELECTRIC: CPT

## 2019-01-01 PROCEDURE — 82553 CREATINE MB FRACTION: CPT

## 2019-01-01 PROCEDURE — 87186 SC STD MICRODIL/AGAR DIL: CPT

## 2019-01-01 PROCEDURE — 36430 TRANSFUSION BLD/BLD COMPNT: CPT

## 2019-01-01 PROCEDURE — 87798 DETECT AGENT NOS DNA AMP: CPT

## 2019-01-01 PROCEDURE — 82248 BILIRUBIN DIRECT: CPT

## 2019-01-01 PROCEDURE — 74160 CT ABDOMEN W/CONTRAST: CPT

## 2019-01-01 PROCEDURE — 77338 DESIGN MLC DEVICE FOR IMRT: CPT | Performed by: RADIOLOGY

## 2019-01-01 PROCEDURE — 36556 INSERT NON-TUNNEL CV CATH: CPT

## 2019-01-01 PROCEDURE — 84295 ASSAY OF SERUM SODIUM: CPT

## 2019-01-01 PROCEDURE — 6360000002 HC RX W HCPCS: Performed by: ANESTHESIOLOGY

## 2019-01-01 PROCEDURE — 82533 TOTAL CORTISOL: CPT

## 2019-01-01 PROCEDURE — 86850 RBC ANTIBODY SCREEN: CPT

## 2019-01-01 PROCEDURE — 92526 ORAL FUNCTION THERAPY: CPT | Performed by: SPEECH-LANGUAGE PATHOLOGIST

## 2019-01-01 PROCEDURE — 82570 ASSAY OF URINE CREATININE: CPT

## 2019-01-01 PROCEDURE — 3700000000 HC ANESTHESIA ATTENDED CARE: Performed by: SURGERY

## 2019-01-01 PROCEDURE — 99284 EMERGENCY DEPT VISIT MOD MDM: CPT

## 2019-01-01 PROCEDURE — 84133 ASSAY OF URINE POTASSIUM: CPT

## 2019-01-01 PROCEDURE — 99999 PR OFFICE/OUTPT VISIT,PROCEDURE ONLY: CPT | Performed by: NURSE PRACTITIONER

## 2019-01-01 PROCEDURE — 94761 N-INVAS EAR/PLS OXIMETRY MLT: CPT

## 2019-01-01 PROCEDURE — 93005 ELECTROCARDIOGRAM TRACING: CPT | Performed by: PREVENTIVE MEDICINE

## 2019-01-01 PROCEDURE — 93005 ELECTROCARDIOGRAM TRACING: CPT | Performed by: EMERGENCY MEDICINE

## 2019-01-01 PROCEDURE — 2700000000 HC OXYGEN THERAPY PER DAY

## 2019-01-01 PROCEDURE — 2580000003 HC RX 258: Performed by: PREVENTIVE MEDICINE

## 2019-01-01 RX ORDER — ESMOLOL HYDROCHLORIDE 10 MG/ML
INJECTION INTRAVENOUS PRN
Status: DISCONTINUED | OUTPATIENT
Start: 2019-01-01 | End: 2019-01-01 | Stop reason: SDUPTHER

## 2019-01-01 RX ORDER — DEXTROSE MONOHYDRATE 25 G/50ML
INJECTION, SOLUTION INTRAVENOUS
Status: DISPENSED
Start: 2019-01-01 | End: 2019-01-01

## 2019-01-01 RX ORDER — CHOLECALCIFEROL (VITAMIN D3) 50 MCG
2000 TABLET ORAL DAILY
Status: DISCONTINUED | OUTPATIENT
Start: 2019-01-01 | End: 2019-01-01 | Stop reason: HOSPADM

## 2019-01-01 RX ORDER — SODIUM CHLORIDE 450 MG/100ML
INJECTION, SOLUTION INTRAVENOUS CONTINUOUS
Status: DISCONTINUED | OUTPATIENT
Start: 2019-01-01 | End: 2019-01-01

## 2019-01-01 RX ORDER — SODIUM CHLORIDE 0.9 % (FLUSH) 0.9 %
10 SYRINGE (ML) INJECTION EVERY 12 HOURS SCHEDULED
Status: DISCONTINUED | OUTPATIENT
Start: 2019-01-01 | End: 2019-01-01 | Stop reason: HOSPADM

## 2019-01-01 RX ORDER — ASPIRIN 81 MG/1
81 TABLET ORAL DAILY
COMMUNITY

## 2019-01-01 RX ORDER — FLUDEOXYGLUCOSE F 18 200 MCI/ML
15 INJECTION, SOLUTION INTRAVENOUS
Status: COMPLETED | OUTPATIENT
Start: 2019-01-01 | End: 2019-01-01

## 2019-01-01 RX ORDER — SODIUM CHLORIDE 0.9 % (FLUSH) 0.9 %
10 SYRINGE (ML) INJECTION PRN
Status: CANCELLED | OUTPATIENT
Start: 2019-01-01

## 2019-01-01 RX ORDER — SODIUM CHLORIDE 9 MG/ML
INJECTION, SOLUTION INTRAVENOUS CONTINUOUS
Status: DISCONTINUED | OUTPATIENT
Start: 2019-01-01 | End: 2019-01-01

## 2019-01-01 RX ORDER — DEXTROSE MONOHYDRATE 25 G/50ML
25 INJECTION, SOLUTION INTRAVENOUS ONCE
Status: COMPLETED | OUTPATIENT
Start: 2019-01-01 | End: 2019-01-01

## 2019-01-01 RX ORDER — DIGOXIN 0.25 MG/ML
250 INJECTION INTRAMUSCULAR; INTRAVENOUS ONCE
Status: COMPLETED | OUTPATIENT
Start: 2019-01-01 | End: 2019-01-01

## 2019-01-01 RX ORDER — DILTIAZEM HYDROCHLORIDE 5 MG/ML
5 INJECTION INTRAVENOUS ONCE
Status: COMPLETED | OUTPATIENT
Start: 2019-01-01 | End: 2019-01-01

## 2019-01-01 RX ORDER — SODIUM CHLORIDE 0.9 % (FLUSH) 0.9 %
10 SYRINGE (ML) INJECTION PRN
Status: DISCONTINUED | OUTPATIENT
Start: 2019-01-01 | End: 2019-01-01 | Stop reason: SDUPTHER

## 2019-01-01 RX ORDER — ACETAMINOPHEN 500 MG
1000 TABLET ORAL ONCE
Status: DISCONTINUED | OUTPATIENT
Start: 2019-01-01 | End: 2019-01-01

## 2019-01-01 RX ORDER — POTASSIUM CHLORIDE 20 MEQ/1
20 TABLET, EXTENDED RELEASE ORAL ONCE
Status: COMPLETED | OUTPATIENT
Start: 2019-01-01 | End: 2019-01-01

## 2019-01-01 RX ORDER — DONEPEZIL HYDROCHLORIDE 5 MG/1
10 TABLET, FILM COATED ORAL DAILY
Status: DISCONTINUED | OUTPATIENT
Start: 2019-01-01 | End: 2019-01-01 | Stop reason: HOSPADM

## 2019-01-01 RX ORDER — HEPARIN SODIUM (PORCINE) LOCK FLUSH IV SOLN 100 UNIT/ML 100 UNIT/ML
500 SOLUTION INTRAVENOUS PRN
Status: CANCELLED | OUTPATIENT
Start: 2019-01-01

## 2019-01-01 RX ORDER — 0.9 % SODIUM CHLORIDE 0.9 %
1000 INTRAVENOUS SOLUTION INTRAVENOUS ONCE
Status: CANCELLED | OUTPATIENT
Start: 2019-01-01

## 2019-01-01 RX ORDER — LIDOCAINE HYDROCHLORIDE 20 MG/ML
JELLY TOPICAL ONCE
Status: COMPLETED | OUTPATIENT
Start: 2019-01-01 | End: 2019-01-01

## 2019-01-01 RX ORDER — 0.9 % SODIUM CHLORIDE 0.9 %
1000 INTRAVENOUS SOLUTION INTRAVENOUS ONCE
Status: COMPLETED | OUTPATIENT
Start: 2019-01-01 | End: 2019-01-01

## 2019-01-01 RX ORDER — DEXTROSE MONOHYDRATE 25 G/50ML
12.5 INJECTION, SOLUTION INTRAVENOUS PRN
Status: DISCONTINUED | OUTPATIENT
Start: 2019-01-01 | End: 2019-01-01 | Stop reason: HOSPADM

## 2019-01-01 RX ORDER — DEXTROSE MONOHYDRATE 50 MG/ML
INJECTION, SOLUTION INTRAVENOUS CONTINUOUS
Status: DISCONTINUED | OUTPATIENT
Start: 2019-01-01 | End: 2019-01-01

## 2019-01-01 RX ORDER — LORAZEPAM 0.5 MG/1
0.5 TABLET ORAL EVERY 4 HOURS PRN
Status: DISCONTINUED | OUTPATIENT
Start: 2019-01-01 | End: 2019-01-01 | Stop reason: HOSPADM

## 2019-01-01 RX ORDER — SODIUM CHLORIDE 0.9 % (FLUSH) 0.9 %
10 SYRINGE (ML) INJECTION EVERY 12 HOURS SCHEDULED
Status: DISCONTINUED | OUTPATIENT
Start: 2019-01-01 | End: 2019-01-01 | Stop reason: SDUPTHER

## 2019-01-01 RX ORDER — HYDRALAZINE HYDROCHLORIDE 20 MG/ML
5 INJECTION INTRAMUSCULAR; INTRAVENOUS EVERY 10 MIN PRN
Status: DISCONTINUED | OUTPATIENT
Start: 2019-01-01 | End: 2019-01-01 | Stop reason: HOSPADM

## 2019-01-01 RX ORDER — SODIUM CHLORIDE 9 MG/ML
INJECTION, SOLUTION INTRAVENOUS CONTINUOUS PRN
Status: DISCONTINUED | OUTPATIENT
Start: 2019-01-01 | End: 2019-01-01 | Stop reason: SDUPTHER

## 2019-01-01 RX ORDER — SODIUM CHLORIDE 0.9 % (FLUSH) 0.9 %
10 SYRINGE (ML) INJECTION PRN
Status: DISCONTINUED | OUTPATIENT
Start: 2019-01-01 | End: 2019-01-01 | Stop reason: HOSPADM

## 2019-01-01 RX ORDER — MIRTAZAPINE 15 MG/1
7.5 TABLET, FILM COATED ORAL NIGHTLY
Status: CANCELLED | OUTPATIENT
Start: 2019-01-01

## 2019-01-01 RX ORDER — LANSOPRAZOLE 30 MG/1
30 CAPSULE, DELAYED RELEASE ORAL DAILY
COMMUNITY

## 2019-01-01 RX ORDER — 0.9 % SODIUM CHLORIDE 0.9 %
10 VIAL (ML) INJECTION DAILY
Status: DISCONTINUED | OUTPATIENT
Start: 2019-01-01 | End: 2019-01-01 | Stop reason: HOSPADM

## 2019-01-01 RX ORDER — BUSPIRONE HYDROCHLORIDE 10 MG/1
10 TABLET ORAL 2 TIMES DAILY
COMMUNITY

## 2019-01-01 RX ORDER — KETAMINE HYDROCHLORIDE 10 MG/ML
INJECTION, SOLUTION INTRAMUSCULAR; INTRAVENOUS
Status: DISCONTINUED
Start: 2019-01-01 | End: 2019-01-01 | Stop reason: WASHOUT

## 2019-01-01 RX ORDER — SODIUM CHLORIDE 9 MG/ML
INJECTION, SOLUTION INTRAVENOUS CONTINUOUS PRN
Status: COMPLETED | OUTPATIENT
Start: 2019-01-01 | End: 2019-01-01

## 2019-01-01 RX ORDER — PROPOFOL 10 MG/ML
INJECTION, EMULSION INTRAVENOUS PRN
Status: DISCONTINUED | OUTPATIENT
Start: 2019-01-01 | End: 2019-01-01 | Stop reason: SDUPTHER

## 2019-01-01 RX ORDER — ETOMIDATE 2 MG/ML
20 INJECTION INTRAVENOUS ONCE
Status: COMPLETED | OUTPATIENT
Start: 2019-01-01 | End: 2019-01-01

## 2019-01-01 RX ORDER — MIRTAZAPINE 15 MG/1
7.5 TABLET, FILM COATED ORAL NIGHTLY
Status: DISCONTINUED | OUTPATIENT
Start: 2019-01-01 | End: 2019-01-01 | Stop reason: HOSPADM

## 2019-01-01 RX ORDER — 0.9 % SODIUM CHLORIDE 0.9 %
500 INTRAVENOUS SOLUTION INTRAVENOUS ONCE
Status: DISCONTINUED | OUTPATIENT
Start: 2019-01-01 | End: 2019-01-01

## 2019-01-01 RX ORDER — ASPIRIN 81 MG/1
81 TABLET ORAL DAILY
Status: DISCONTINUED | OUTPATIENT
Start: 2019-01-01 | End: 2019-01-01 | Stop reason: HOSPADM

## 2019-01-01 RX ORDER — ROCURONIUM BROMIDE 10 MG/ML
INJECTION, SOLUTION INTRAVENOUS
Status: DISPENSED
Start: 2019-01-01 | End: 2019-01-01

## 2019-01-01 RX ORDER — SODIUM CHLORIDE 9 MG/ML
INJECTION, SOLUTION INTRAVENOUS CONTINUOUS
Status: DISCONTINUED | OUTPATIENT
Start: 2019-01-01 | End: 2019-01-01 | Stop reason: HOSPADM

## 2019-01-01 RX ORDER — PANTOPRAZOLE SODIUM 40 MG/1
40 TABLET, DELAYED RELEASE ORAL
Status: DISCONTINUED | OUTPATIENT
Start: 2019-01-01 | End: 2019-01-01 | Stop reason: SDUPTHER

## 2019-01-01 RX ORDER — 0.9 % SODIUM CHLORIDE 0.9 %
500 INTRAVENOUS SOLUTION INTRAVENOUS ONCE
Status: COMPLETED | OUTPATIENT
Start: 2019-01-01 | End: 2019-01-01

## 2019-01-01 RX ORDER — ACETAMINOPHEN 325 MG/1
650 TABLET ORAL EVERY 4 HOURS PRN
Status: DISCONTINUED | OUTPATIENT
Start: 2019-01-01 | End: 2019-01-01 | Stop reason: HOSPADM

## 2019-01-01 RX ORDER — LABETALOL HYDROCHLORIDE 5 MG/ML
INJECTION, SOLUTION INTRAVENOUS
Status: COMPLETED
Start: 2019-01-01 | End: 2019-01-01

## 2019-01-01 RX ORDER — LISINOPRIL 10 MG/1
10 TABLET ORAL DAILY
Status: DISCONTINUED | OUTPATIENT
Start: 2019-01-01 | End: 2019-01-01 | Stop reason: HOSPADM

## 2019-01-01 RX ORDER — PANTOPRAZOLE SODIUM 40 MG/10ML
40 INJECTION, POWDER, LYOPHILIZED, FOR SOLUTION INTRAVENOUS 2 TIMES DAILY
Status: DISCONTINUED | OUTPATIENT
Start: 2019-01-01 | End: 2019-01-01

## 2019-01-01 RX ORDER — DEXTROSE MONOHYDRATE 25 G/50ML
50 INJECTION, SOLUTION INTRAVENOUS ONCE
Status: COMPLETED | OUTPATIENT
Start: 2019-01-01 | End: 2019-01-01

## 2019-01-01 RX ORDER — LIDOCAINE HYDROCHLORIDE 10 MG/ML
INJECTION, SOLUTION INFILTRATION; PERINEURAL PRN
Status: DISCONTINUED | OUTPATIENT
Start: 2019-01-01 | End: 2019-01-01 | Stop reason: ALTCHOICE

## 2019-01-01 RX ORDER — ROCURONIUM BROMIDE 10 MG/ML
100 INJECTION, SOLUTION INTRAVENOUS ONCE
Status: COMPLETED | OUTPATIENT
Start: 2019-01-01 | End: 2019-01-01

## 2019-01-01 RX ORDER — PANTOPRAZOLE SODIUM 40 MG/1
40 TABLET, DELAYED RELEASE ORAL
Status: DISCONTINUED | OUTPATIENT
Start: 2019-01-01 | End: 2019-01-01 | Stop reason: HOSPADM

## 2019-01-01 RX ORDER — DOXAZOSIN 2 MG/1
2 TABLET ORAL DAILY
Status: DISCONTINUED | OUTPATIENT
Start: 2019-01-01 | End: 2019-01-01 | Stop reason: HOSPADM

## 2019-01-01 RX ORDER — MIRTAZAPINE 7.5 MG/1
7.5 TABLET, FILM COATED ORAL NIGHTLY
Qty: 30 TABLET | Refills: 0
Start: 2019-01-01

## 2019-01-01 RX ORDER — PROPRANOLOL HYDROCHLORIDE 20 MG/1
20 TABLET ORAL 2 TIMES DAILY
Status: DISCONTINUED | OUTPATIENT
Start: 2019-01-01 | End: 2019-01-01 | Stop reason: HOSPADM

## 2019-01-01 RX ORDER — M-VIT,TX,IRON,MINS/CALC/FOLIC 27MG-0.4MG
1 TABLET ORAL DAILY
Status: DISCONTINUED | OUTPATIENT
Start: 2019-01-01 | End: 2019-01-01 | Stop reason: HOSPADM

## 2019-01-01 RX ORDER — ACETAMINOPHEN 500 MG
1000 TABLET ORAL ONCE
Status: COMPLETED | OUTPATIENT
Start: 2019-01-01 | End: 2019-01-01

## 2019-01-01 RX ORDER — PANTOPRAZOLE SODIUM 40 MG/10ML
40 INJECTION, POWDER, LYOPHILIZED, FOR SOLUTION INTRAVENOUS DAILY
Status: DISCONTINUED | OUTPATIENT
Start: 2019-01-01 | End: 2019-01-01 | Stop reason: HOSPADM

## 2019-01-01 RX ORDER — ETOMIDATE 2 MG/ML
INJECTION INTRAVENOUS
Status: DISPENSED
Start: 2019-01-01 | End: 2019-01-01

## 2019-01-01 RX ORDER — HALOPERIDOL 5 MG/ML
2 INJECTION INTRAMUSCULAR EVERY 6 HOURS PRN
Status: DISCONTINUED | OUTPATIENT
Start: 2019-01-01 | End: 2019-01-01 | Stop reason: HOSPADM

## 2019-01-01 RX ORDER — DEXTROSE MONOHYDRATE 50 MG/ML
INJECTION, SOLUTION INTRAVENOUS CONTINUOUS
Status: ACTIVE | OUTPATIENT
Start: 2019-01-01 | End: 2019-01-01

## 2019-01-01 RX ORDER — LIDOCAINE HYDROCHLORIDE 10 MG/ML
INJECTION, SOLUTION EPIDURAL; INFILTRATION; INTRACAUDAL; PERINEURAL
Status: COMPLETED
Start: 2019-01-01 | End: 2019-01-01

## 2019-01-01 RX ORDER — PHENAZOPYRIDINE HYDROCHLORIDE 200 MG/1
200 TABLET, FILM COATED ORAL
Qty: 9 TABLET | Refills: 0
Start: 2019-01-01 | End: 2019-01-01

## 2019-01-01 RX ORDER — PRAVASTATIN SODIUM 20 MG
40 TABLET ORAL DAILY
Status: DISCONTINUED | OUTPATIENT
Start: 2019-01-01 | End: 2019-01-01 | Stop reason: HOSPADM

## 2019-01-01 RX ORDER — ATROPINE SULFATE 0.1 MG/ML
0.5 INJECTION INTRAVENOUS ONCE
Status: COMPLETED | OUTPATIENT
Start: 2019-01-01 | End: 2019-01-01

## 2019-01-01 RX ORDER — ENALAPRILAT 2.5 MG/2ML
1.25 INJECTION INTRAVENOUS ONCE
Status: COMPLETED | OUTPATIENT
Start: 2019-01-01 | End: 2019-01-01

## 2019-01-01 RX ORDER — CEFDINIR 300 MG/1
300 CAPSULE ORAL EVERY 12 HOURS SCHEDULED
Status: DISCONTINUED | OUTPATIENT
Start: 2019-01-01 | End: 2019-01-01 | Stop reason: HOSPADM

## 2019-01-01 RX ORDER — CLOPIDOGREL BISULFATE 75 MG/1
75 TABLET ORAL DAILY
Status: DISCONTINUED | OUTPATIENT
Start: 2019-01-01 | End: 2019-01-01 | Stop reason: HOSPADM

## 2019-01-01 RX ORDER — LACTOBACILLUS RHAMNOSUS GG 10B CELL
1 CAPSULE ORAL DAILY
Status: DISCONTINUED | OUTPATIENT
Start: 2019-01-01 | End: 2019-01-01 | Stop reason: HOSPADM

## 2019-01-01 RX ORDER — PANTOPRAZOLE SODIUM 40 MG/1
40 TABLET, DELAYED RELEASE ORAL
Qty: 30 TABLET | Refills: 0
Start: 2019-01-01

## 2019-01-01 RX ORDER — NICOTINE POLACRILEX 4 MG
15 LOZENGE BUCCAL PRN
Status: DISCONTINUED | OUTPATIENT
Start: 2019-01-01 | End: 2019-01-01 | Stop reason: HOSPADM

## 2019-01-01 RX ORDER — MEMANTINE HYDROCHLORIDE 5 MG/1
5 TABLET ORAL 2 TIMES DAILY
Status: DISCONTINUED | OUTPATIENT
Start: 2019-01-01 | End: 2019-01-01 | Stop reason: HOSPADM

## 2019-01-01 RX ORDER — CALCIUM GLUCONATE 94 MG/ML
1 INJECTION, SOLUTION INTRAVENOUS ONCE
Status: COMPLETED | OUTPATIENT
Start: 2019-01-01 | End: 2019-01-01

## 2019-01-01 RX ORDER — PRAVASTATIN SODIUM 20 MG
40 TABLET ORAL NIGHTLY
Status: DISCONTINUED | OUTPATIENT
Start: 2019-01-01 | End: 2019-01-01 | Stop reason: HOSPADM

## 2019-01-01 RX ORDER — PANTOPRAZOLE SODIUM 40 MG/1
40 GRANULE, DELAYED RELEASE ORAL
Status: DISCONTINUED | OUTPATIENT
Start: 2019-01-01 | End: 2019-01-01 | Stop reason: HOSPADM

## 2019-01-01 RX ORDER — SODIUM CHLORIDE 0.9 % (FLUSH) 0.9 %
10 SYRINGE (ML) INJECTION
Status: ACTIVE | OUTPATIENT
Start: 2019-01-01 | End: 2019-01-01

## 2019-01-01 RX ORDER — FENTANYL CITRATE 50 UG/ML
100 INJECTION, SOLUTION INTRAMUSCULAR; INTRAVENOUS ONCE
Status: COMPLETED | OUTPATIENT
Start: 2019-01-01 | End: 2019-01-01

## 2019-01-01 RX ORDER — GRANULES FOR ORAL 3 G/1
3 POWDER ORAL
Status: DISCONTINUED | OUTPATIENT
Start: 2019-01-01 | End: 2019-01-01 | Stop reason: HOSPADM

## 2019-01-01 RX ORDER — 0.9 % SODIUM CHLORIDE 0.9 %
250 INTRAVENOUS SOLUTION INTRAVENOUS ONCE
Status: DISCONTINUED | OUTPATIENT
Start: 2019-01-01 | End: 2019-01-01 | Stop reason: HOSPADM

## 2019-01-01 RX ORDER — PHENAZOPYRIDINE HYDROCHLORIDE 100 MG/1
200 TABLET, FILM COATED ORAL
Status: DISCONTINUED | OUTPATIENT
Start: 2019-01-01 | End: 2019-01-01 | Stop reason: HOSPADM

## 2019-01-01 RX ORDER — ACETAMINOPHEN 160 MG/5ML
1000 SOLUTION ORAL ONCE
Status: COMPLETED | OUTPATIENT
Start: 2019-01-01 | End: 2019-01-01

## 2019-01-01 RX ORDER — DEXTROSE AND SODIUM CHLORIDE 5; .9 G/100ML; G/100ML
INJECTION, SOLUTION INTRAVENOUS CONTINUOUS
Status: DISCONTINUED | OUTPATIENT
Start: 2019-01-01 | End: 2019-01-01

## 2019-01-01 RX ORDER — SENNOSIDES 8.6 MG
650 CAPSULE ORAL EVERY 8 HOURS PRN
COMMUNITY

## 2019-01-01 RX ORDER — ACETAMINOPHEN 160 MG/5ML
SOLUTION ORAL
Status: DISPENSED
Start: 2019-01-01 | End: 2019-01-01

## 2019-01-01 RX ORDER — ENALAPRILAT 2.5 MG/2ML
INJECTION INTRAVENOUS
Status: DISCONTINUED
Start: 2019-01-01 | End: 2019-01-01 | Stop reason: HOSPADM

## 2019-01-01 RX ORDER — DEXTROSE MONOHYDRATE 50 MG/ML
100 INJECTION, SOLUTION INTRAVENOUS PRN
Status: DISCONTINUED | OUTPATIENT
Start: 2019-01-01 | End: 2019-01-01 | Stop reason: HOSPADM

## 2019-01-01 RX ORDER — FENTANYL CITRATE 50 UG/ML
INJECTION, SOLUTION INTRAMUSCULAR; INTRAVENOUS
Status: DISPENSED
Start: 2019-01-01 | End: 2019-01-01

## 2019-01-01 RX ORDER — MIDAZOLAM HYDROCHLORIDE 1 MG/ML
2 INJECTION INTRAMUSCULAR; INTRAVENOUS ONCE
Status: COMPLETED | OUTPATIENT
Start: 2019-01-01 | End: 2019-01-01

## 2019-01-01 RX ORDER — HYDRALAZINE HYDROCHLORIDE 20 MG/ML
10 INJECTION INTRAMUSCULAR; INTRAVENOUS EVERY 6 HOURS PRN
Status: DISCONTINUED | OUTPATIENT
Start: 2019-01-01 | End: 2019-01-01 | Stop reason: HOSPADM

## 2019-01-01 RX ORDER — POTASSIUM CHLORIDE 20 MEQ/1
20 TABLET, EXTENDED RELEASE ORAL 2 TIMES DAILY WITH MEALS
Status: DISCONTINUED | OUTPATIENT
Start: 2019-01-01 | End: 2019-01-01

## 2019-01-01 RX ORDER — LABETALOL HYDROCHLORIDE 5 MG/ML
5 INJECTION, SOLUTION INTRAVENOUS EVERY 5 MIN PRN
Status: COMPLETED | OUTPATIENT
Start: 2019-01-01 | End: 2019-01-01

## 2019-01-01 RX ORDER — FUROSEMIDE 10 MG/ML
20 INJECTION INTRAMUSCULAR; INTRAVENOUS ONCE
Status: COMPLETED | OUTPATIENT
Start: 2019-01-01 | End: 2019-01-01

## 2019-01-01 RX ORDER — HEPARIN SODIUM 10000 [USP'U]/ML
5000 INJECTION, SOLUTION INTRAVENOUS; SUBCUTANEOUS EVERY 8 HOURS SCHEDULED
Status: DISCONTINUED | OUTPATIENT
Start: 2019-01-01 | End: 2019-01-01 | Stop reason: HOSPADM

## 2019-01-01 RX ORDER — FLUDROCORTISONE ACETATE 0.1 MG/1
0.05 TABLET ORAL DAILY
Status: DISCONTINUED | OUTPATIENT
Start: 2019-01-01 | End: 2019-01-01

## 2019-01-01 RX ORDER — MULTIVITAMIN WITH FOLIC ACID 400 MCG
1 TABLET ORAL DAILY
Status: DISCONTINUED | OUTPATIENT
Start: 2019-01-01 | End: 2019-01-01 | Stop reason: HOSPADM

## 2019-01-01 RX ORDER — GABAPENTIN 100 MG/1
100 CAPSULE ORAL 2 TIMES DAILY
Status: DISCONTINUED | OUTPATIENT
Start: 2019-01-01 | End: 2019-01-01 | Stop reason: HOSPADM

## 2019-01-01 RX ORDER — AMANTADINE HYDROCHLORIDE 100 MG/1
TABLET ORAL
Refills: 5 | COMMUNITY
Start: 2019-01-01 | End: 2019-01-01

## 2019-01-01 RX ORDER — 0.9 % SODIUM CHLORIDE 0.9 %
10 VIAL (ML) INJECTION PRN
Status: DISCONTINUED | OUTPATIENT
Start: 2019-01-01 | End: 2019-01-01 | Stop reason: SDUPTHER

## 2019-01-01 RX ORDER — DIGOXIN 250 MCG
250 TABLET ORAL DAILY
Status: DISCONTINUED | OUTPATIENT
Start: 2019-01-01 | End: 2019-01-01 | Stop reason: HOSPADM

## 2019-01-01 RX ORDER — SODIUM CHLORIDE 9 MG/ML
INJECTION, SOLUTION INTRAVENOUS
Status: COMPLETED
Start: 2019-01-01 | End: 2019-01-01

## 2019-01-01 RX ORDER — ONDANSETRON 2 MG/ML
4 INJECTION INTRAMUSCULAR; INTRAVENOUS EVERY 6 HOURS PRN
Status: DISCONTINUED | OUTPATIENT
Start: 2019-01-01 | End: 2019-01-01 | Stop reason: HOSPADM

## 2019-01-01 RX ADMIN — PHENAZOPYRIDINE HYDROCHLORIDE 200 MG: 100 TABLET ORAL at 08:33

## 2019-01-01 RX ADMIN — Medication 10 ML: at 09:44

## 2019-01-01 RX ADMIN — CLOPIDOGREL 75 MG: 75 TABLET, FILM COATED ORAL at 08:33

## 2019-01-01 RX ADMIN — Medication 10 ML: at 21:02

## 2019-01-01 RX ADMIN — MULTIPLE VITAMINS W/ MINERALS TAB 1 TABLET: TAB at 09:55

## 2019-01-01 RX ADMIN — MEMANTINE HYDROCHLORIDE 5 MG: 5 TABLET, FILM COATED ORAL at 10:23

## 2019-01-01 RX ADMIN — GABAPENTIN 100 MG: 100 CAPSULE ORAL at 21:02

## 2019-01-01 RX ADMIN — SODIUM CHLORIDE 1000 ML: 9 INJECTION, SOLUTION INTRAVENOUS at 15:30

## 2019-01-01 RX ADMIN — HYDRALAZINE HYDROCHLORIDE 5 MG: 20 INJECTION INTRAMUSCULAR; INTRAVENOUS at 16:47

## 2019-01-01 RX ADMIN — SODIUM CHLORIDE 1000 ML: 9 INJECTION, SOLUTION INTRAVENOUS at 13:48

## 2019-01-01 RX ADMIN — LISINOPRIL 10 MG: 10 TABLET ORAL at 08:43

## 2019-01-01 RX ADMIN — Medication 2000 UNITS: at 10:16

## 2019-01-01 RX ADMIN — PHENAZOPYRIDINE HYDROCHLORIDE 200 MG: 100 TABLET ORAL at 18:56

## 2019-01-01 RX ADMIN — DOXAZOSIN 2 MG: 2 TABLET ORAL at 10:23

## 2019-01-01 RX ADMIN — PANTOPRAZOLE SODIUM 40 MG: 40 INJECTION, POWDER, FOR SOLUTION INTRAVENOUS at 10:34

## 2019-01-01 RX ADMIN — Medication 2000 UNITS: at 10:32

## 2019-01-01 RX ADMIN — PROPRANOLOL HYDROCHLORIDE 20 MG: 20 TABLET ORAL at 08:34

## 2019-01-01 RX ADMIN — PROPOFOL 30 MG: 10 INJECTION, EMULSION INTRAVENOUS at 14:41

## 2019-01-01 RX ADMIN — CARBIDOPA AND LEVODOPA 1 TABLET: 25; 100 TABLET ORAL at 13:03

## 2019-01-01 RX ADMIN — Medication 10 ML: at 06:31

## 2019-01-01 RX ADMIN — ASCORBIC ACID 1500 MG: 500 INJECTION, SOLUTION INTRAMUSCULAR; INTRAVENOUS; SUBCUTANEOUS at 23:46

## 2019-01-01 RX ADMIN — FLUDROCORTISONE ACETATE 0.05 MG: 0.1 TABLET ORAL at 08:59

## 2019-01-01 RX ADMIN — CLOPIDOGREL 75 MG: 75 TABLET, FILM COATED ORAL at 09:56

## 2019-01-01 RX ADMIN — MIRTAZAPINE 7.5 MG: 15 TABLET, FILM COATED ORAL at 21:40

## 2019-01-01 RX ADMIN — DONEPEZIL HYDROCHLORIDE 10 MG: 5 TABLET, FILM COATED ORAL at 10:30

## 2019-01-01 RX ADMIN — PROPRANOLOL HYDROCHLORIDE 20 MG: 20 TABLET ORAL at 21:40

## 2019-01-01 RX ADMIN — IOHEXOL 50 ML: 240 INJECTION, SOLUTION INTRATHECAL; INTRAVASCULAR; INTRAVENOUS; ORAL at 09:23

## 2019-01-01 RX ADMIN — HEPARIN SODIUM 5000 UNITS: 10000 INJECTION INTRAVENOUS; SUBCUTANEOUS at 14:50

## 2019-01-01 RX ADMIN — Medication 2000 UNITS: at 10:24

## 2019-01-01 RX ADMIN — ASCORBIC ACID 1500 MG: 500 INJECTION, SOLUTION INTRAMUSCULAR; INTRAVENOUS; SUBCUTANEOUS at 11:39

## 2019-01-01 RX ADMIN — MIRTAZAPINE 7.5 MG: 15 TABLET, FILM COATED ORAL at 20:36

## 2019-01-01 RX ADMIN — ASCORBIC ACID 1500 MG: 500 INJECTION, SOLUTION INTRAMUSCULAR; INTRAVENOUS; SUBCUTANEOUS at 01:26

## 2019-01-01 RX ADMIN — PHENAZOPYRIDINE HYDROCHLORIDE 200 MG: 100 TABLET ORAL at 10:29

## 2019-01-01 RX ADMIN — AMPICILLIN SODIUM 2 G: 2 INJECTION, POWDER, FOR SOLUTION INTRAMUSCULAR; INTRAVENOUS at 14:47

## 2019-01-01 RX ADMIN — MUPIROCIN: 20 OINTMENT TOPICAL at 20:25

## 2019-01-01 RX ADMIN — Medication 10 ML: at 09:05

## 2019-01-01 RX ADMIN — SODIUM CHLORIDE: 9 INJECTION, SOLUTION INTRAVENOUS at 20:09

## 2019-01-01 RX ADMIN — CARBIDOPA AND LEVODOPA 1 TABLET: 25; 100 TABLET ORAL at 21:04

## 2019-01-01 RX ADMIN — Medication 125 MCG/HR: at 04:29

## 2019-01-01 RX ADMIN — Medication 2 MCG/MIN: at 13:08

## 2019-01-01 RX ADMIN — GABAPENTIN 100 MG: 100 CAPSULE ORAL at 20:35

## 2019-01-01 RX ADMIN — MUPIROCIN: 20 OINTMENT TOPICAL at 08:58

## 2019-01-01 RX ADMIN — PROPRANOLOL HYDROCHLORIDE 20 MG: 20 TABLET ORAL at 10:23

## 2019-01-01 RX ADMIN — MEMANTINE HYDROCHLORIDE 5 MG: 5 TABLET, FILM COATED ORAL at 21:04

## 2019-01-01 RX ADMIN — CARBIDOPA AND LEVODOPA 1 TABLET: 25; 100 TABLET ORAL at 09:56

## 2019-01-01 RX ADMIN — MUPIROCIN: 20 OINTMENT TOPICAL at 09:00

## 2019-01-01 RX ADMIN — Medication 10 ML: at 20:59

## 2019-01-01 RX ADMIN — SODIUM CHLORIDE: 9 INJECTION, SOLUTION INTRAVENOUS at 13:29

## 2019-01-01 RX ADMIN — PRAVASTATIN SODIUM 40 MG: 20 TABLET ORAL at 10:08

## 2019-01-01 RX ADMIN — HYDRALAZINE HYDROCHLORIDE 10 MG: 20 INJECTION, SOLUTION INTRAMUSCULAR; INTRAVENOUS at 08:37

## 2019-01-01 RX ADMIN — MUPIROCIN: 20 OINTMENT TOPICAL at 20:22

## 2019-01-01 RX ADMIN — PHENYLEPHRINE HYDROCHLORIDE 75 MCG/MIN: 10 INJECTION INTRAVENOUS at 14:25

## 2019-01-01 RX ADMIN — MEMANTINE HYDROCHLORIDE 5 MG: 5 TABLET, FILM COATED ORAL at 01:09

## 2019-01-01 RX ADMIN — PROPRANOLOL HYDROCHLORIDE 20 MG: 20 TABLET ORAL at 20:58

## 2019-01-01 RX ADMIN — PANTOPRAZOLE SODIUM 40 MG: 40 INJECTION, POWDER, FOR SOLUTION INTRAVENOUS at 21:36

## 2019-01-01 RX ADMIN — Medication 10 ML: at 13:59

## 2019-01-01 RX ADMIN — METRONIDAZOLE 500 MG: 500 INJECTION, SOLUTION INTRAVENOUS at 05:45

## 2019-01-01 RX ADMIN — MEMANTINE HYDROCHLORIDE 5 MG: 5 TABLET, FILM COATED ORAL at 08:50

## 2019-01-01 RX ADMIN — CARBIDOPA AND LEVODOPA 1 TABLET: 25; 100 TABLET ORAL at 15:55

## 2019-01-01 RX ADMIN — MIRTAZAPINE 7.5 MG: 15 TABLET, FILM COATED ORAL at 20:25

## 2019-01-01 RX ADMIN — LISINOPRIL 10 MG: 10 TABLET ORAL at 10:10

## 2019-01-01 RX ADMIN — CARBIDOPA AND LEVODOPA 1 TABLET: 25; 100 TABLET ORAL at 13:00

## 2019-01-01 RX ADMIN — PANTOPRAZOLE SODIUM 8 MG/HR: 40 INJECTION, POWDER, FOR SOLUTION INTRAVENOUS at 15:42

## 2019-01-01 RX ADMIN — Medication 2000 UNITS: at 08:33

## 2019-01-01 RX ADMIN — MEMANTINE HYDROCHLORIDE 5 MG: 5 TABLET, FILM COATED ORAL at 10:08

## 2019-01-01 RX ADMIN — ASPIRIN 81 MG: 81 TABLET ORAL at 08:42

## 2019-01-01 RX ADMIN — MEMANTINE HYDROCHLORIDE 5 MG: 5 TABLET, FILM COATED ORAL at 09:15

## 2019-01-01 RX ADMIN — Medication 75 MCG/HR: at 13:33

## 2019-01-01 RX ADMIN — CALCIUM GLUCONATE 1 G: 98 INJECTION, SOLUTION INTRAVENOUS at 04:39

## 2019-01-01 RX ADMIN — PRAVASTATIN SODIUM 40 MG: 20 TABLET ORAL at 10:30

## 2019-01-01 RX ADMIN — SODIUM CHLORIDE 1000 ML: 9 INJECTION, SOLUTION INTRAVENOUS at 13:22

## 2019-01-01 RX ADMIN — Medication 2000 UNITS: at 08:43

## 2019-01-01 RX ADMIN — PRAVASTATIN SODIUM 40 MG: 20 TABLET ORAL at 18:48

## 2019-01-01 RX ADMIN — PROPRANOLOL HYDROCHLORIDE 20 MG: 20 TABLET ORAL at 08:33

## 2019-01-01 RX ADMIN — SODIUM CHLORIDE: 9 INJECTION, SOLUTION INTRAVENOUS at 23:31

## 2019-01-01 RX ADMIN — CEFDINIR 300 MG: 300 CAPSULE ORAL at 10:08

## 2019-01-01 RX ADMIN — HYDRALAZINE HYDROCHLORIDE 10 MG: 20 INJECTION, SOLUTION INTRAMUSCULAR; INTRAVENOUS at 08:36

## 2019-01-01 RX ADMIN — INSULIN LISPRO 14 UNITS: 100 INJECTION, SOLUTION INTRAVENOUS; SUBCUTANEOUS at 00:37

## 2019-01-01 RX ADMIN — MIRTAZAPINE 7.5 MG: 15 TABLET, FILM COATED ORAL at 20:58

## 2019-01-01 RX ADMIN — CARBIDOPA AND LEVODOPA 1 TABLET: 25; 100 TABLET ORAL at 10:23

## 2019-01-01 RX ADMIN — LABETALOL HYDROCHLORIDE 5 MG: 5 INJECTION INTRAVENOUS at 15:57

## 2019-01-01 RX ADMIN — VASOPRESSIN 0.03 UNITS/MIN: 20 INJECTION INTRAVENOUS at 22:10

## 2019-01-01 RX ADMIN — ACETAMINOPHEN ORAL SOLUTION 1000 MG: 650 SOLUTION ORAL at 15:05

## 2019-01-01 RX ADMIN — Medication 10 ML: at 20:32

## 2019-01-01 RX ADMIN — MEMANTINE HYDROCHLORIDE 5 MG: 5 TABLET, FILM COATED ORAL at 21:30

## 2019-01-01 RX ADMIN — Medication 2000 UNITS: at 10:09

## 2019-01-01 RX ADMIN — DIGOXIN 250 MCG: 250 TABLET ORAL at 18:48

## 2019-01-01 RX ADMIN — ATROPINE SULFATE 0.5 MG: 0.1 INJECTION PARENTERAL at 15:04

## 2019-01-01 RX ADMIN — ASPIRIN 81 MG: 81 TABLET ORAL at 09:16

## 2019-01-01 RX ADMIN — PRAVASTATIN SODIUM 40 MG: 20 TABLET ORAL at 08:14

## 2019-01-01 RX ADMIN — AMPICILLIN SODIUM 2 G: 2 INJECTION, POWDER, FOR SOLUTION INTRAMUSCULAR; INTRAVENOUS at 13:37

## 2019-01-01 RX ADMIN — CLOPIDOGREL 75 MG: 75 TABLET, FILM COATED ORAL at 18:49

## 2019-01-01 RX ADMIN — VANCOMYCIN HYDROCHLORIDE 500 MG: 10 INJECTION, POWDER, LYOPHILIZED, FOR SOLUTION INTRAVENOUS at 17:59

## 2019-01-01 RX ADMIN — PROPRANOLOL HYDROCHLORIDE 20 MG: 20 TABLET ORAL at 22:12

## 2019-01-01 RX ADMIN — ASCORBIC ACID 1500 MG: 500 INJECTION, SOLUTION INTRAMUSCULAR; INTRAVENOUS; SUBCUTANEOUS at 01:00

## 2019-01-01 RX ADMIN — LIDOCAINE HYDROCHLORIDE 5 ML: 10 INJECTION, SOLUTION EPIDURAL; INFILTRATION; INTRACAUDAL; PERINEURAL at 05:21

## 2019-01-01 RX ADMIN — Medication 10 ML: at 20:26

## 2019-01-01 RX ADMIN — PANTOPRAZOLE SODIUM 40 MG: 40 INJECTION, POWDER, FOR SOLUTION INTRAVENOUS at 20:26

## 2019-01-01 RX ADMIN — DIGOXIN 250 MCG: 250 TABLET ORAL at 08:34

## 2019-01-01 RX ADMIN — BARIUM SULFATE 45 G: 0.6 CREAM ORAL at 14:52

## 2019-01-01 RX ADMIN — PHENAZOPYRIDINE HYDROCHLORIDE 200 MG: 100 TABLET ORAL at 09:14

## 2019-01-01 RX ADMIN — Medication 10 ML: at 13:22

## 2019-01-01 RX ADMIN — LISINOPRIL 10 MG: 10 TABLET ORAL at 10:24

## 2019-01-01 RX ADMIN — PANTOPRAZOLE SODIUM 40 MG: 40 INJECTION, POWDER, FOR SOLUTION INTRAVENOUS at 20:59

## 2019-01-01 RX ADMIN — DOXAZOSIN 2 MG: 2 TABLET ORAL at 18:49

## 2019-01-01 RX ADMIN — HYDRALAZINE HYDROCHLORIDE 10 MG: 20 INJECTION, SOLUTION INTRAMUSCULAR; INTRAVENOUS at 19:57

## 2019-01-01 RX ADMIN — DONEPEZIL HYDROCHLORIDE 10 MG: 5 TABLET, FILM COATED ORAL at 08:50

## 2019-01-01 RX ADMIN — GABAPENTIN 100 MG: 100 CAPSULE ORAL at 20:58

## 2019-01-01 RX ADMIN — CLOPIDOGREL 75 MG: 75 TABLET, FILM COATED ORAL at 10:16

## 2019-01-01 RX ADMIN — LABETALOL HYDROCHLORIDE 5 MG: 5 INJECTION INTRAVENOUS at 16:04

## 2019-01-01 RX ADMIN — Medication 10 ML: at 20:33

## 2019-01-01 RX ADMIN — Medication 10 ML: at 22:33

## 2019-01-01 RX ADMIN — POTASSIUM CHLORIDE 20 MEQ: 20 TABLET, EXTENDED RELEASE ORAL at 13:35

## 2019-01-01 RX ADMIN — Medication 10 ML: at 21:05

## 2019-01-01 RX ADMIN — PROPRANOLOL HYDROCHLORIDE 20 MG: 20 TABLET ORAL at 09:16

## 2019-01-01 RX ADMIN — CLOPIDOGREL 75 MG: 75 TABLET, FILM COATED ORAL at 10:23

## 2019-01-01 RX ADMIN — LISINOPRIL 10 MG: 10 TABLET ORAL at 08:35

## 2019-01-01 RX ADMIN — DONEPEZIL HYDROCHLORIDE 10 MG: 5 TABLET, FILM COATED ORAL at 08:34

## 2019-01-01 RX ADMIN — ASPIRIN 81 MG: 81 TABLET ORAL at 10:16

## 2019-01-01 RX ADMIN — MEMANTINE HYDROCHLORIDE 5 MG: 5 TABLET, FILM COATED ORAL at 08:34

## 2019-01-01 RX ADMIN — Medication 10 ML: at 07:51

## 2019-01-01 RX ADMIN — PHENAZOPYRIDINE HYDROCHLORIDE 200 MG: 100 TABLET ORAL at 17:06

## 2019-01-01 RX ADMIN — PANTOPRAZOLE SODIUM 40 MG: 40 INJECTION, POWDER, FOR SOLUTION INTRAVENOUS at 09:09

## 2019-01-01 RX ADMIN — GABAPENTIN 100 MG: 100 CAPSULE ORAL at 22:31

## 2019-01-01 RX ADMIN — SODIUM BICARBONATE 50 MEQ: 84 INJECTION, SOLUTION INTRAVENOUS at 23:57

## 2019-01-01 RX ADMIN — LISINOPRIL 10 MG: 10 TABLET ORAL at 10:15

## 2019-01-01 RX ADMIN — PROPRANOLOL HYDROCHLORIDE 20 MG: 20 TABLET ORAL at 21:03

## 2019-01-01 RX ADMIN — HYDROCORTISONE SODIUM SUCCINATE 50 MG: 100 INJECTION, POWDER, FOR SOLUTION INTRAMUSCULAR; INTRAVENOUS at 18:04

## 2019-01-01 RX ADMIN — Medication 10 ML: at 09:01

## 2019-01-01 RX ADMIN — CARBIDOPA AND LEVODOPA 1 TABLET: 25; 100 TABLET ORAL at 14:26

## 2019-01-01 RX ADMIN — Medication 10 MCG/MIN: at 00:30

## 2019-01-01 RX ADMIN — PROPOFOL 20 MG: 10 INJECTION, EMULSION INTRAVENOUS at 14:38

## 2019-01-01 RX ADMIN — PHENAZOPYRIDINE HYDROCHLORIDE 200 MG: 100 TABLET ORAL at 10:23

## 2019-01-01 RX ADMIN — ASPIRIN 81 MG: 81 TABLET ORAL at 18:48

## 2019-01-01 RX ADMIN — ASCORBIC ACID 1500 MG: 500 INJECTION, SOLUTION INTRAMUSCULAR; INTRAVENOUS; SUBCUTANEOUS at 02:37

## 2019-01-01 RX ADMIN — ROCURONIUM BROMIDE 100 MG: 10 INJECTION INTRAVENOUS at 18:44

## 2019-01-01 RX ADMIN — FUROSEMIDE 20 MG: 10 INJECTION, SOLUTION INTRAMUSCULAR; INTRAVENOUS at 21:53

## 2019-01-01 RX ADMIN — DOXAZOSIN 2 MG: 2 TABLET ORAL at 08:33

## 2019-01-01 RX ADMIN — Medication 10 ML: at 20:23

## 2019-01-01 RX ADMIN — CARBIDOPA AND LEVODOPA 1 TABLET: 25; 100 TABLET ORAL at 09:15

## 2019-01-01 RX ADMIN — ASPIRIN 81 MG: 81 TABLET ORAL at 08:33

## 2019-01-01 RX ADMIN — ENOXAPARIN SODIUM 30 MG: 30 INJECTION, SOLUTION INTRAVENOUS; SUBCUTANEOUS at 08:34

## 2019-01-01 RX ADMIN — CARBIDOPA AND LEVODOPA 1 TABLET: 25; 100 TABLET ORAL at 10:18

## 2019-01-01 RX ADMIN — Medication 10 ML: at 23:48

## 2019-01-01 RX ADMIN — CARBIDOPA AND LEVODOPA 1 TABLET: 25; 100 TABLET ORAL at 22:12

## 2019-01-01 RX ADMIN — VANCOMYCIN HYDROCHLORIDE 500 MG: 10 INJECTION, POWDER, LYOPHILIZED, FOR SOLUTION INTRAVENOUS at 00:10

## 2019-01-01 RX ADMIN — METRONIDAZOLE 500 MG: 500 INJECTION, SOLUTION INTRAVENOUS at 22:15

## 2019-01-01 RX ADMIN — MIRTAZAPINE 7.5 MG: 15 TABLET, FILM COATED ORAL at 21:16

## 2019-01-01 RX ADMIN — ASCORBIC ACID 1500 MG: 500 INJECTION, SOLUTION INTRAMUSCULAR; INTRAVENOUS; SUBCUTANEOUS at 18:42

## 2019-01-01 RX ADMIN — Medication 10 ML: at 10:02

## 2019-01-01 RX ADMIN — FLUDROCORTISONE ACETATE 0.05 MG: 0.1 TABLET ORAL at 19:32

## 2019-01-01 RX ADMIN — VANCOMYCIN HYDROCHLORIDE 500 MG: 10 INJECTION, POWDER, LYOPHILIZED, FOR SOLUTION INTRAVENOUS at 05:45

## 2019-01-01 RX ADMIN — VANCOMYCIN HYDROCHLORIDE 500 MG: 10 INJECTION, POWDER, LYOPHILIZED, FOR SOLUTION INTRAVENOUS at 18:16

## 2019-01-01 RX ADMIN — INSULIN HUMAN 10 UNITS: 100 INJECTION, SOLUTION PARENTERAL at 20:32

## 2019-01-01 RX ADMIN — Medication 10 ML: at 21:15

## 2019-01-01 RX ADMIN — CARBIDOPA AND LEVODOPA 1 TABLET: 25; 100 TABLET ORAL at 14:51

## 2019-01-01 RX ADMIN — PANTOPRAZOLE SODIUM 8 MG/HR: 40 INJECTION, POWDER, FOR SOLUTION INTRAVENOUS at 15:43

## 2019-01-01 RX ADMIN — Medication: at 09:43

## 2019-01-01 RX ADMIN — DEXTROSE MONOHYDRATE: 50 INJECTION, SOLUTION INTRAVENOUS at 09:43

## 2019-01-01 RX ADMIN — CARBIDOPA AND LEVODOPA 1 TABLET: 25; 100 TABLET ORAL at 10:32

## 2019-01-01 RX ADMIN — Medication 10 ML: at 09:20

## 2019-01-01 RX ADMIN — DOXAZOSIN 2 MG: 2 TABLET ORAL at 10:10

## 2019-01-01 RX ADMIN — ASPIRIN 81 MG: 81 TABLET ORAL at 10:23

## 2019-01-01 RX ADMIN — Medication 1 CAPSULE: at 14:50

## 2019-01-01 RX ADMIN — ASCORBIC ACID 1500 MG: 500 INJECTION, SOLUTION INTRAMUSCULAR; INTRAVENOUS; SUBCUTANEOUS at 08:00

## 2019-01-01 RX ADMIN — SODIUM CHLORIDE 1000 ML: 9 INJECTION, SOLUTION INTRAVENOUS at 00:12

## 2019-01-01 RX ADMIN — PROPRANOLOL HYDROCHLORIDE 20 MG: 20 TABLET ORAL at 08:44

## 2019-01-01 RX ADMIN — HYDROCORTISONE SODIUM SUCCINATE 50 MG: 100 INJECTION, POWDER, FOR SOLUTION INTRAMUSCULAR; INTRAVENOUS at 23:47

## 2019-01-01 RX ADMIN — HYDROCORTISONE SODIUM SUCCINATE 50 MG: 100 INJECTION, POWDER, FOR SOLUTION INTRAMUSCULAR; INTRAVENOUS at 05:54

## 2019-01-01 RX ADMIN — HYDRALAZINE HYDROCHLORIDE 10 MG: 20 INJECTION, SOLUTION INTRAMUSCULAR; INTRAVENOUS at 23:09

## 2019-01-01 RX ADMIN — SODIUM CHLORIDE: 4.5 INJECTION, SOLUTION INTRAVENOUS at 18:11

## 2019-01-01 RX ADMIN — DIGOXIN 250 MCG: 0.25 INJECTION INTRAMUSCULAR; INTRAVENOUS at 11:36

## 2019-01-01 RX ADMIN — MUPIROCIN: 20 OINTMENT TOPICAL at 20:28

## 2019-01-01 RX ADMIN — MULTIPLE VITAMINS W/ MINERALS TAB 1 TABLET: TAB at 08:35

## 2019-01-01 RX ADMIN — MEMANTINE HYDROCHLORIDE 5 MG: 5 TABLET, FILM COATED ORAL at 08:44

## 2019-01-01 RX ADMIN — Medication: at 20:24

## 2019-01-01 RX ADMIN — LIDOCAINE HYDROCHLORIDE: 20 JELLY TOPICAL at 17:20

## 2019-01-01 RX ADMIN — VASOPRESSIN 0.03 UNITS/MIN: 20 INJECTION INTRAVENOUS at 21:41

## 2019-01-01 RX ADMIN — PHENAZOPYRIDINE HYDROCHLORIDE 200 MG: 100 TABLET ORAL at 17:02

## 2019-01-01 RX ADMIN — VANCOMYCIN HYDROCHLORIDE 500 MG: 10 INJECTION, POWDER, LYOPHILIZED, FOR SOLUTION INTRAVENOUS at 12:00

## 2019-01-01 RX ADMIN — CLOPIDOGREL 75 MG: 75 TABLET, FILM COATED ORAL at 08:15

## 2019-01-01 RX ADMIN — THIAMINE HYDROCHLORIDE 200 MG: 100 INJECTION, SOLUTION INTRAMUSCULAR; INTRAVENOUS at 20:30

## 2019-01-01 RX ADMIN — PHENAZOPYRIDINE HYDROCHLORIDE 200 MG: 100 TABLET ORAL at 10:22

## 2019-01-01 RX ADMIN — ASPIRIN 81 MG: 81 TABLET ORAL at 10:31

## 2019-01-01 RX ADMIN — LISINOPRIL 10 MG: 10 TABLET ORAL at 09:15

## 2019-01-01 RX ADMIN — HYDROCORTISONE SODIUM SUCCINATE 50 MG: 100 INJECTION, POWDER, FOR SOLUTION INTRAMUSCULAR; INTRAVENOUS at 00:02

## 2019-01-01 RX ADMIN — PRAVASTATIN SODIUM 40 MG: 20 TABLET ORAL at 08:43

## 2019-01-01 RX ADMIN — CARBIDOPA AND LEVODOPA 1 TABLET: 25; 100 TABLET ORAL at 21:02

## 2019-01-01 RX ADMIN — PHENAZOPYRIDINE HYDROCHLORIDE 200 MG: 100 TABLET ORAL at 09:58

## 2019-01-01 RX ADMIN — LISINOPRIL 10 MG: 10 TABLET ORAL at 18:49

## 2019-01-01 RX ADMIN — Medication 29.97 MCG/MIN: at 13:34

## 2019-01-01 RX ADMIN — WATER 2 G: 1 INJECTION INTRAMUSCULAR; INTRAVENOUS; SUBCUTANEOUS at 12:37

## 2019-01-01 RX ADMIN — Medication 10 ML: at 10:25

## 2019-01-01 RX ADMIN — ENOXAPARIN SODIUM 30 MG: 30 INJECTION, SOLUTION INTRAVENOUS; SUBCUTANEOUS at 09:55

## 2019-01-01 RX ADMIN — IOPAMIDOL 90 ML: 755 INJECTION, SOLUTION INTRAVENOUS at 09:23

## 2019-01-01 RX ADMIN — MIRTAZAPINE 7.5 MG: 15 TABLET, FILM COATED ORAL at 21:04

## 2019-01-01 RX ADMIN — PANTOPRAZOLE SODIUM 8 MG/HR: 40 INJECTION, POWDER, FOR SOLUTION INTRAVENOUS at 03:21

## 2019-01-01 RX ADMIN — PROPRANOLOL HYDROCHLORIDE 20 MG: 20 TABLET ORAL at 09:55

## 2019-01-01 RX ADMIN — CARBIDOPA AND LEVODOPA 1 TABLET: 25; 100 TABLET ORAL at 20:58

## 2019-01-01 RX ADMIN — Medication 10 ML: at 20:25

## 2019-01-01 RX ADMIN — DIGOXIN 250 MCG: 250 TABLET ORAL at 10:24

## 2019-01-01 RX ADMIN — Medication: at 09:00

## 2019-01-01 RX ADMIN — CARBIDOPA AND LEVODOPA 1 TABLET: 25; 100 TABLET ORAL at 22:31

## 2019-01-01 RX ADMIN — Medication 2 MCG/MIN: at 17:45

## 2019-01-01 RX ADMIN — HEPARIN SODIUM 5000 UNITS: 10000 INJECTION INTRAVENOUS; SUBCUTANEOUS at 05:50

## 2019-01-01 RX ADMIN — GABAPENTIN 100 MG: 100 CAPSULE ORAL at 10:25

## 2019-01-01 RX ADMIN — PANTOPRAZOLE SODIUM 40 MG: 40 INJECTION, POWDER, FOR SOLUTION INTRAVENOUS at 21:02

## 2019-01-01 RX ADMIN — WATER 2 G: 1 INJECTION INTRAMUSCULAR; INTRAVENOUS; SUBCUTANEOUS at 18:45

## 2019-01-01 RX ADMIN — MEMANTINE HYDROCHLORIDE 5 MG: 5 TABLET, FILM COATED ORAL at 20:58

## 2019-01-01 RX ADMIN — GABAPENTIN 100 MG: 100 CAPSULE ORAL at 08:14

## 2019-01-01 RX ADMIN — PANTOPRAZOLE SODIUM 40 MG: 40 GRANULE, DELAYED RELEASE ORAL at 12:18

## 2019-01-01 RX ADMIN — PHENYLEPHRINE HYDROCHLORIDE 100 MCG/MIN: 10 INJECTION INTRAVENOUS at 14:08

## 2019-01-01 RX ADMIN — SODIUM CHLORIDE: 9 INJECTION, SOLUTION INTRAVENOUS at 20:28

## 2019-01-01 RX ADMIN — HYDROCORTISONE SODIUM SUCCINATE 50 MG: 100 INJECTION, POWDER, FOR SOLUTION INTRAMUSCULAR; INTRAVENOUS at 01:11

## 2019-01-01 RX ADMIN — HEPARIN SODIUM 5000 UNITS: 10000 INJECTION INTRAVENOUS; SUBCUTANEOUS at 05:45

## 2019-01-01 RX ADMIN — MULTIPLE VITAMINS W/ MINERALS TAB 1 TABLET: TAB at 08:33

## 2019-01-01 RX ADMIN — PRAVASTATIN SODIUM 40 MG: 20 TABLET ORAL at 10:23

## 2019-01-01 RX ADMIN — PHENAZOPYRIDINE HYDROCHLORIDE 200 MG: 100 TABLET ORAL at 12:04

## 2019-01-01 RX ADMIN — ENOXAPARIN SODIUM 30 MG: 30 INJECTION, SOLUTION INTRAVENOUS; SUBCUTANEOUS at 08:39

## 2019-01-01 RX ADMIN — WATER 2 G: 1 INJECTION INTRAMUSCULAR; INTRAVENOUS; SUBCUTANEOUS at 13:02

## 2019-01-01 RX ADMIN — SODIUM CHLORIDE: 4.5 INJECTION, SOLUTION INTRAVENOUS at 22:30

## 2019-01-01 RX ADMIN — BARIUM SULFATE 58 G: 960 POWDER, FOR SUSPENSION ORAL at 15:11

## 2019-01-01 RX ADMIN — PANTOPRAZOLE SODIUM 40 MG: 40 INJECTION, POWDER, FOR SOLUTION INTRAVENOUS at 22:31

## 2019-01-01 RX ADMIN — GABAPENTIN 100 MG: 100 CAPSULE ORAL at 10:24

## 2019-01-01 RX ADMIN — GABAPENTIN 100 MG: 100 CAPSULE ORAL at 01:09

## 2019-01-01 RX ADMIN — PROPRANOLOL HYDROCHLORIDE 20 MG: 20 TABLET ORAL at 10:29

## 2019-01-01 RX ADMIN — VANCOMYCIN HYDROCHLORIDE 500 MG: 10 INJECTION, POWDER, LYOPHILIZED, FOR SOLUTION INTRAVENOUS at 18:54

## 2019-01-01 RX ADMIN — ENOXAPARIN SODIUM 40 MG: 40 INJECTION SUBCUTANEOUS at 18:54

## 2019-01-01 RX ADMIN — INSULIN HUMAN 10 UNITS: 100 INJECTION, SOLUTION PARENTERAL at 04:38

## 2019-01-01 RX ADMIN — ENOXAPARIN SODIUM 30 MG: 30 INJECTION, SOLUTION INTRAVENOUS; SUBCUTANEOUS at 08:44

## 2019-01-01 RX ADMIN — GABAPENTIN 100 MG: 100 CAPSULE ORAL at 10:07

## 2019-01-01 RX ADMIN — PROPRANOLOL HYDROCHLORIDE 20 MG: 20 TABLET ORAL at 08:14

## 2019-01-01 RX ADMIN — Medication 10 ML: at 09:00

## 2019-01-01 RX ADMIN — GABAPENTIN 100 MG: 100 CAPSULE ORAL at 10:15

## 2019-01-01 RX ADMIN — PANTOPRAZOLE SODIUM 40 MG: 40 INJECTION, POWDER, FOR SOLUTION INTRAVENOUS at 10:39

## 2019-01-01 RX ADMIN — CARBIDOPA AND LEVODOPA 1 TABLET: 25; 100 TABLET ORAL at 20:36

## 2019-01-01 RX ADMIN — HEPARIN SODIUM 5000 UNITS: 10000 INJECTION INTRAVENOUS; SUBCUTANEOUS at 13:39

## 2019-01-01 RX ADMIN — GABAPENTIN 100 MG: 100 CAPSULE ORAL at 21:35

## 2019-01-01 RX ADMIN — DOXAZOSIN 2 MG: 2 TABLET ORAL at 09:15

## 2019-01-01 RX ADMIN — PHENAZOPYRIDINE HYDROCHLORIDE 200 MG: 100 TABLET ORAL at 17:28

## 2019-01-01 RX ADMIN — ASCORBIC ACID 1500 MG: 500 INJECTION, SOLUTION INTRAMUSCULAR; INTRAVENOUS; SUBCUTANEOUS at 14:01

## 2019-01-01 RX ADMIN — Medication 1 CAPSULE: at 09:03

## 2019-01-01 RX ADMIN — PANTOPRAZOLE SODIUM 40 MG: 40 INJECTION, POWDER, FOR SOLUTION INTRAVENOUS at 08:58

## 2019-01-01 RX ADMIN — DEXTROSE AND SODIUM CHLORIDE: 5; 900 INJECTION, SOLUTION INTRAVENOUS at 13:47

## 2019-01-01 RX ADMIN — AMPICILLIN SODIUM 2 G: 2 INJECTION, POWDER, FOR SOLUTION INTRAMUSCULAR; INTRAVENOUS at 05:50

## 2019-01-01 RX ADMIN — MEMANTINE HYDROCHLORIDE 5 MG: 5 TABLET, FILM COATED ORAL at 21:35

## 2019-01-01 RX ADMIN — PHENYLEPHRINE HYDROCHLORIDE 150 MCG/MIN: 10 INJECTION INTRAVENOUS at 01:39

## 2019-01-01 RX ADMIN — HALOPERIDOL LACTATE 2 MG: 5 INJECTION INTRAMUSCULAR at 20:04

## 2019-01-01 RX ADMIN — ACETAMINOPHEN 650 MG: 325 TABLET, FILM COATED ORAL at 17:28

## 2019-01-01 RX ADMIN — ASCORBIC ACID 1500 MG: 500 INJECTION, SOLUTION INTRAMUSCULAR; INTRAVENOUS; SUBCUTANEOUS at 20:25

## 2019-01-01 RX ADMIN — DIGOXIN 250 MCG: 250 TABLET ORAL at 10:07

## 2019-01-01 RX ADMIN — PANTOPRAZOLE SODIUM 40 MG: 40 INJECTION, POWDER, FOR SOLUTION INTRAVENOUS at 10:01

## 2019-01-01 RX ADMIN — PANTOPRAZOLE SODIUM 40 MG: 40 INJECTION, POWDER, FOR SOLUTION INTRAVENOUS at 09:17

## 2019-01-01 RX ADMIN — MIRTAZAPINE 7.5 MG: 15 TABLET, FILM COATED ORAL at 22:32

## 2019-01-01 RX ADMIN — DONEPEZIL HYDROCHLORIDE 10 MG: 5 TABLET, FILM COATED ORAL at 10:23

## 2019-01-01 RX ADMIN — ACETAMINOPHEN 650 MG: 325 TABLET ORAL at 21:52

## 2019-01-01 RX ADMIN — MIRTAZAPINE 7.5 MG: 15 TABLET, FILM COATED ORAL at 21:03

## 2019-01-01 RX ADMIN — PHENAZOPYRIDINE HYDROCHLORIDE 200 MG: 100 TABLET ORAL at 16:43

## 2019-01-01 RX ADMIN — PRAVASTATIN SODIUM 40 MG: 20 TABLET ORAL at 09:55

## 2019-01-01 RX ADMIN — MIRTAZAPINE 7.5 MG: 15 TABLET, FILM COATED ORAL at 01:09

## 2019-01-01 RX ADMIN — LISINOPRIL 10 MG: 10 TABLET ORAL at 08:14

## 2019-01-01 RX ADMIN — Medication 1 CAPSULE: at 08:57

## 2019-01-01 RX ADMIN — SODIUM CHLORIDE 1000 ML: 9 INJECTION, SOLUTION INTRAVENOUS at 23:27

## 2019-01-01 RX ADMIN — ENOXAPARIN SODIUM 30 MG: 30 INJECTION, SOLUTION INTRAVENOUS; SUBCUTANEOUS at 10:25

## 2019-01-01 RX ADMIN — CLOPIDOGREL 75 MG: 75 TABLET, FILM COATED ORAL at 08:34

## 2019-01-01 RX ADMIN — SODIUM CHLORIDE: 9 INJECTION, SOLUTION INTRAVENOUS at 08:34

## 2019-01-01 RX ADMIN — DIGOXIN 250 MCG: 250 TABLET ORAL at 10:32

## 2019-01-01 RX ADMIN — CLOPIDOGREL 75 MG: 75 TABLET, FILM COATED ORAL at 08:44

## 2019-01-01 RX ADMIN — DEXTROSE MONOHYDRATE 5 MG/HR: 50 INJECTION, SOLUTION INTRAVENOUS at 22:27

## 2019-01-01 RX ADMIN — MEMANTINE HYDROCHLORIDE 5 MG: 5 TABLET, FILM COATED ORAL at 09:56

## 2019-01-01 RX ADMIN — METRONIDAZOLE 500 MG: 500 INJECTION, SOLUTION INTRAVENOUS at 06:16

## 2019-01-01 RX ADMIN — PROPRANOLOL HYDROCHLORIDE 20 MG: 20 TABLET ORAL at 21:35

## 2019-01-01 RX ADMIN — AMPICILLIN SODIUM 2 G: 2 INJECTION, POWDER, FOR SOLUTION INTRAMUSCULAR; INTRAVENOUS at 05:21

## 2019-01-01 RX ADMIN — CEFEPIME HYDROCHLORIDE 1 G: 1 INJECTION, POWDER, FOR SOLUTION INTRAMUSCULAR; INTRAVENOUS at 13:11

## 2019-01-01 RX ADMIN — Medication: at 20:51

## 2019-01-01 RX ADMIN — Medication 2000 UNITS: at 08:35

## 2019-01-01 RX ADMIN — METRONIDAZOLE 500 MG: 500 INJECTION, SOLUTION INTRAVENOUS at 14:09

## 2019-01-01 RX ADMIN — ASCORBIC ACID 1500 MG: 500 INJECTION, SOLUTION INTRAMUSCULAR; INTRAVENOUS; SUBCUTANEOUS at 20:02

## 2019-01-01 RX ADMIN — CARBIDOPA AND LEVODOPA 1 TABLET: 25; 100 TABLET ORAL at 15:18

## 2019-01-01 RX ADMIN — MUPIROCIN: 20 OINTMENT TOPICAL at 08:59

## 2019-01-01 RX ADMIN — SODIUM CHLORIDE: 9 INJECTION, SOLUTION INTRAVENOUS at 14:25

## 2019-01-01 RX ADMIN — VANCOMYCIN HYDROCHLORIDE 500 MG: 10 INJECTION, POWDER, LYOPHILIZED, FOR SOLUTION INTRAVENOUS at 05:55

## 2019-01-01 RX ADMIN — VANCOMYCIN HYDROCHLORIDE 500 MG: 10 INJECTION, POWDER, LYOPHILIZED, FOR SOLUTION INTRAVENOUS at 11:38

## 2019-01-01 RX ADMIN — PANTOPRAZOLE SODIUM 40 MG: 40 INJECTION, POWDER, FOR SOLUTION INTRAVENOUS at 07:51

## 2019-01-01 RX ADMIN — Medication 10 ML: at 08:59

## 2019-01-01 RX ADMIN — PANTOPRAZOLE SODIUM 40 MG: 40 INJECTION, POWDER, FOR SOLUTION INTRAVENOUS at 09:42

## 2019-01-01 RX ADMIN — INSULIN HUMAN 5 UNITS: 100 INJECTION, SOLUTION PARENTERAL at 14:45

## 2019-01-01 RX ADMIN — ASPIRIN 81 MG: 81 TABLET ORAL at 09:55

## 2019-01-01 RX ADMIN — PROPRANOLOL HYDROCHLORIDE 20 MG: 20 TABLET ORAL at 21:17

## 2019-01-01 RX ADMIN — Medication 10 ML: at 06:08

## 2019-01-01 RX ADMIN — VANCOMYCIN HYDROCHLORIDE 500 MG: 10 INJECTION, POWDER, LYOPHILIZED, FOR SOLUTION INTRAVENOUS at 17:23

## 2019-01-01 RX ADMIN — CEFEPIME HYDROCHLORIDE 1 G: 1 INJECTION, POWDER, FOR SOLUTION INTRAMUSCULAR; INTRAVENOUS at 04:36

## 2019-01-01 RX ADMIN — MEMANTINE HYDROCHLORIDE 5 MG: 5 TABLET, FILM COATED ORAL at 10:24

## 2019-01-01 RX ADMIN — MUPIROCIN: 20 OINTMENT TOPICAL at 09:04

## 2019-01-01 RX ADMIN — DIGOXIN 250 MCG: 250 TABLET ORAL at 08:15

## 2019-01-01 RX ADMIN — Medication 10 ML: at 10:19

## 2019-01-01 RX ADMIN — ASPIRIN 81 MG: 81 TABLET ORAL at 08:50

## 2019-01-01 RX ADMIN — Medication 10 ML: at 10:07

## 2019-01-01 RX ADMIN — PHENYLEPHRINE HYDROCHLORIDE 100 MCG/MIN: 10 INJECTION INTRAVENOUS at 07:48

## 2019-01-01 RX ADMIN — DONEPEZIL HYDROCHLORIDE 10 MG: 5 TABLET, FILM COATED ORAL at 08:44

## 2019-01-01 RX ADMIN — MUPIROCIN: 20 OINTMENT TOPICAL at 09:01

## 2019-01-01 RX ADMIN — DOXAZOSIN 2 MG: 2 TABLET ORAL at 08:43

## 2019-01-01 RX ADMIN — ASPIRIN 81 MG: 81 TABLET ORAL at 10:08

## 2019-01-01 RX ADMIN — CALCIUM GLUCONATE 1 G: 98 INJECTION, SOLUTION INTRAVENOUS at 20:26

## 2019-01-01 RX ADMIN — Medication 10 ML: at 21:36

## 2019-01-01 RX ADMIN — PROPOFOL 20 MG: 10 INJECTION, EMULSION INTRAVENOUS at 14:36

## 2019-01-01 RX ADMIN — VASOPRESSIN 0.02 UNITS/MIN: 20 INJECTION INTRAVENOUS at 22:48

## 2019-01-01 RX ADMIN — WATER 2 G: 1 INJECTION INTRAMUSCULAR; INTRAVENOUS; SUBCUTANEOUS at 12:54

## 2019-01-01 RX ADMIN — DIGOXIN 250 MCG: 0.25 INJECTION INTRAMUSCULAR; INTRAVENOUS at 19:49

## 2019-01-01 RX ADMIN — GABAPENTIN 100 MG: 100 CAPSULE ORAL at 22:12

## 2019-01-01 RX ADMIN — ASCORBIC ACID 1500 MG: 500 INJECTION, SOLUTION INTRAMUSCULAR; INTRAVENOUS; SUBCUTANEOUS at 18:03

## 2019-01-01 RX ADMIN — Medication 10 ML: at 21:40

## 2019-01-01 RX ADMIN — DEXTROSE 50 % IN WATER (D50W) INTRAVENOUS SYRINGE 25 G: at 20:32

## 2019-01-01 RX ADMIN — Medication 10 ML: at 08:34

## 2019-01-01 RX ADMIN — Medication 100 MCG/HR: at 17:29

## 2019-01-01 RX ADMIN — CARBIDOPA AND LEVODOPA 1 TABLET: 25; 100 TABLET ORAL at 08:15

## 2019-01-01 RX ADMIN — SODIUM CHLORIDE 500 ML: 9 INJECTION, SOLUTION INTRAVENOUS at 18:00

## 2019-01-01 RX ADMIN — Medication: at 09:04

## 2019-01-01 RX ADMIN — FOSFOMYCIN TROMETHAMINE 1 PACKET: 3 POWDER ORAL at 14:50

## 2019-01-01 RX ADMIN — ASPIRIN 81 MG: 81 TABLET ORAL at 08:34

## 2019-01-01 RX ADMIN — HEPARIN SODIUM 5000 UNITS: 10000 INJECTION INTRAVENOUS; SUBCUTANEOUS at 01:13

## 2019-01-01 RX ADMIN — Medication 125 MCG/HR: at 14:50

## 2019-01-01 RX ADMIN — DONEPEZIL HYDROCHLORIDE 10 MG: 5 TABLET, FILM COATED ORAL at 08:33

## 2019-01-01 RX ADMIN — ACETAMINOPHEN 650 MG: 325 TABLET ORAL at 13:23

## 2019-01-01 RX ADMIN — PHENYLEPHRINE HYDROCHLORIDE 150 MCG/MIN: 10 INJECTION INTRAVENOUS at 20:01

## 2019-01-01 RX ADMIN — Medication: at 20:28

## 2019-01-01 RX ADMIN — CARBIDOPA AND LEVODOPA 1 TABLET: 25; 100 TABLET ORAL at 08:33

## 2019-01-01 RX ADMIN — FOSFOMYCIN TROMETHAMINE 1 PACKET: 3 POWDER ORAL at 13:34

## 2019-01-01 RX ADMIN — PANTOPRAZOLE SODIUM 40 MG: 40 INJECTION, POWDER, FOR SOLUTION INTRAVENOUS at 21:15

## 2019-01-01 RX ADMIN — VANCOMYCIN HYDROCHLORIDE 500 MG: 10 INJECTION, POWDER, LYOPHILIZED, FOR SOLUTION INTRAVENOUS at 05:37

## 2019-01-01 RX ADMIN — GABAPENTIN 100 MG: 100 CAPSULE ORAL at 10:28

## 2019-01-01 RX ADMIN — GABAPENTIN 100 MG: 100 CAPSULE ORAL at 08:44

## 2019-01-01 RX ADMIN — Medication 10 ML: at 10:01

## 2019-01-01 RX ADMIN — SODIUM CHLORIDE 500 ML: 9 INJECTION, SOLUTION INTRAVENOUS at 00:38

## 2019-01-01 RX ADMIN — CARBIDOPA AND LEVODOPA 1 TABLET: 25; 100 TABLET ORAL at 13:27

## 2019-01-01 RX ADMIN — ENOXAPARIN SODIUM 30 MG: 30 INJECTION, SOLUTION INTRAVENOUS; SUBCUTANEOUS at 08:35

## 2019-01-01 RX ADMIN — MEMANTINE HYDROCHLORIDE 5 MG: 5 TABLET, FILM COATED ORAL at 10:18

## 2019-01-01 RX ADMIN — HEPARIN SODIUM 5000 UNITS: 10000 INJECTION INTRAVENOUS; SUBCUTANEOUS at 13:34

## 2019-01-01 RX ADMIN — SODIUM CHLORIDE: 9 INJECTION, SOLUTION INTRAVENOUS at 18:41

## 2019-01-01 RX ADMIN — DEXTROSE AND SODIUM CHLORIDE: 5; 900 INJECTION, SOLUTION INTRAVENOUS at 00:20

## 2019-01-01 RX ADMIN — DONEPEZIL HYDROCHLORIDE 10 MG: 5 TABLET, FILM COATED ORAL at 10:09

## 2019-01-01 RX ADMIN — VANCOMYCIN HYDROCHLORIDE 500 MG: 10 INJECTION, POWDER, LYOPHILIZED, FOR SOLUTION INTRAVENOUS at 05:50

## 2019-01-01 RX ADMIN — ACETAMINOPHEN 1000 MG: 500 TABLET ORAL at 08:56

## 2019-01-01 RX ADMIN — DOXAZOSIN 2 MG: 2 TABLET ORAL at 10:15

## 2019-01-01 RX ADMIN — CARBIDOPA AND LEVODOPA 1 TABLET: 25; 100 TABLET ORAL at 14:49

## 2019-01-01 RX ADMIN — PRAVASTATIN SODIUM 40 MG: 20 TABLET ORAL at 09:15

## 2019-01-01 RX ADMIN — ASCORBIC ACID 1500 MG: 500 INJECTION, SOLUTION INTRAMUSCULAR; INTRAVENOUS; SUBCUTANEOUS at 13:32

## 2019-01-01 RX ADMIN — CALCIUM GLUCONATE 1 G: 98 INJECTION, SOLUTION INTRAVENOUS at 14:45

## 2019-01-01 RX ADMIN — MULTIPLE VITAMINS W/ MINERALS TAB 1 TABLET: TAB at 10:10

## 2019-01-01 RX ADMIN — THIAMINE HYDROCHLORIDE 200 MG: 100 INJECTION, SOLUTION INTRAMUSCULAR; INTRAVENOUS at 20:51

## 2019-01-01 RX ADMIN — PROPRANOLOL HYDROCHLORIDE 20 MG: 20 TABLET ORAL at 21:04

## 2019-01-01 RX ADMIN — MULTIPLE VITAMINS W/ MINERALS TAB 1 TABLET: TAB at 10:31

## 2019-01-01 RX ADMIN — Medication 10 ML: at 22:13

## 2019-01-01 RX ADMIN — PHENAZOPYRIDINE HYDROCHLORIDE 200 MG: 100 TABLET ORAL at 10:07

## 2019-01-01 RX ADMIN — VANCOMYCIN HYDROCHLORIDE 500 MG: 10 INJECTION, POWDER, LYOPHILIZED, FOR SOLUTION INTRAVENOUS at 11:58

## 2019-01-01 RX ADMIN — CARBIDOPA AND LEVODOPA 1 TABLET: 25; 100 TABLET ORAL at 21:18

## 2019-01-01 RX ADMIN — LISINOPRIL 10 MG: 10 TABLET ORAL at 08:33

## 2019-01-01 RX ADMIN — VANCOMYCIN HYDROCHLORIDE 500 MG: 10 INJECTION, POWDER, LYOPHILIZED, FOR SOLUTION INTRAVENOUS at 00:09

## 2019-01-01 RX ADMIN — VANCOMYCIN HYDROCHLORIDE 125 MG: 10 INJECTION, POWDER, LYOPHILIZED, FOR SOLUTION INTRAVENOUS at 06:27

## 2019-01-01 RX ADMIN — LISINOPRIL 10 MG: 10 TABLET ORAL at 10:23

## 2019-01-01 RX ADMIN — FLUDEOXYGLUCOSE F 18 15 MILLICURIE: 200 INJECTION, SOLUTION INTRAVENOUS at 08:26

## 2019-01-01 RX ADMIN — VANCOMYCIN HYDROCHLORIDE 1250 MG: 10 INJECTION, POWDER, LYOPHILIZED, FOR SOLUTION INTRAVENOUS at 15:35

## 2019-01-01 RX ADMIN — MEMANTINE HYDROCHLORIDE 5 MG: 5 TABLET, FILM COATED ORAL at 10:31

## 2019-01-01 RX ADMIN — Medication 125 MCG/HR: at 18:38

## 2019-01-01 RX ADMIN — HEPARIN SODIUM 5000 UNITS: 10000 INJECTION INTRAVENOUS; SUBCUTANEOUS at 14:14

## 2019-01-01 RX ADMIN — GLUCAGON HYDROCHLORIDE 1 MG: KIT at 18:26

## 2019-01-01 RX ADMIN — MUPIROCIN: 20 OINTMENT TOPICAL at 09:43

## 2019-01-01 RX ADMIN — SODIUM CHLORIDE: 9 INJECTION, SOLUTION INTRAVENOUS at 02:58

## 2019-01-01 RX ADMIN — HYDRALAZINE HYDROCHLORIDE 10 MG: 20 INJECTION, SOLUTION INTRAMUSCULAR; INTRAVENOUS at 18:31

## 2019-01-01 RX ADMIN — DIGOXIN 250 MCG: 250 TABLET ORAL at 08:44

## 2019-01-01 RX ADMIN — METRONIDAZOLE 500 MG: 500 INJECTION, SOLUTION INTRAVENOUS at 22:01

## 2019-01-01 RX ADMIN — MEMANTINE HYDROCHLORIDE 5 MG: 5 TABLET, FILM COATED ORAL at 21:40

## 2019-01-01 RX ADMIN — PHENAZOPYRIDINE HYDROCHLORIDE 200 MG: 100 TABLET ORAL at 13:17

## 2019-01-01 RX ADMIN — PHENYLEPHRINE HYDROCHLORIDE 75 MCG/MIN: 10 INJECTION INTRAVENOUS at 19:39

## 2019-01-01 RX ADMIN — VANCOMYCIN HYDROCHLORIDE 125 MG: 10 INJECTION, POWDER, LYOPHILIZED, FOR SOLUTION INTRAVENOUS at 01:41

## 2019-01-01 RX ADMIN — VANCOMYCIN HYDROCHLORIDE 500 MG: 10 INJECTION, POWDER, LYOPHILIZED, FOR SOLUTION INTRAVENOUS at 00:17

## 2019-01-01 RX ADMIN — MUPIROCIN: 20 OINTMENT TOPICAL at 20:52

## 2019-01-01 RX ADMIN — DOXAZOSIN 2 MG: 2 TABLET ORAL at 10:31

## 2019-01-01 RX ADMIN — ASCORBIC ACID 1500 MG: 500 INJECTION, SOLUTION INTRAMUSCULAR; INTRAVENOUS; SUBCUTANEOUS at 20:14

## 2019-01-01 RX ADMIN — PANTOPRAZOLE SODIUM 8 MG/HR: 40 INJECTION, POWDER, FOR SOLUTION INTRAVENOUS at 19:53

## 2019-01-01 RX ADMIN — SODIUM BICARBONATE 50 MEQ: 84 INJECTION, SOLUTION INTRAVENOUS at 01:39

## 2019-01-01 RX ADMIN — Medication 2 G: at 14:28

## 2019-01-01 RX ADMIN — Medication 10 ML: at 13:02

## 2019-01-01 RX ADMIN — GABAPENTIN 100 MG: 100 CAPSULE ORAL at 20:25

## 2019-01-01 RX ADMIN — AMPICILLIN SODIUM 2 G: 2 INJECTION, POWDER, FOR SOLUTION INTRAMUSCULAR; INTRAVENOUS at 06:48

## 2019-01-01 RX ADMIN — DOXAZOSIN 2 MG: 2 TABLET ORAL at 08:15

## 2019-01-01 RX ADMIN — ENOXAPARIN SODIUM 30 MG: 30 INJECTION, SOLUTION INTRAVENOUS; SUBCUTANEOUS at 08:15

## 2019-01-01 RX ADMIN — MULTIPLE VITAMINS W/ MINERALS TAB 1 TABLET: TAB at 09:16

## 2019-01-01 RX ADMIN — MIDAZOLAM HYDROCHLORIDE 2 MG: 2 INJECTION, SOLUTION INTRAMUSCULAR; INTRAVENOUS at 23:58

## 2019-01-01 RX ADMIN — PROPRANOLOL HYDROCHLORIDE 20 MG: 20 TABLET ORAL at 22:31

## 2019-01-01 RX ADMIN — VANCOMYCIN HYDROCHLORIDE 500 MG: 10 INJECTION, POWDER, LYOPHILIZED, FOR SOLUTION INTRAVENOUS at 12:07

## 2019-01-01 RX ADMIN — MULTIPLE VITAMINS W/ MINERALS TAB 1 TABLET: TAB at 10:24

## 2019-01-01 RX ADMIN — CARBIDOPA AND LEVODOPA 1 TABLET: 25; 100 TABLET ORAL at 21:35

## 2019-01-01 RX ADMIN — DONEPEZIL HYDROCHLORIDE 10 MG: 5 TABLET, FILM COATED ORAL at 08:15

## 2019-01-01 RX ADMIN — HYDRALAZINE HYDROCHLORIDE 10 MG: 20 INJECTION, SOLUTION INTRAMUSCULAR; INTRAVENOUS at 08:33

## 2019-01-01 RX ADMIN — Medication: at 08:58

## 2019-01-01 RX ADMIN — PROPRANOLOL HYDROCHLORIDE 20 MG: 20 TABLET ORAL at 20:25

## 2019-01-01 RX ADMIN — MULTIVITAMIN TABLET 1 TABLET: TABLET at 08:50

## 2019-01-01 RX ADMIN — ENOXAPARIN SODIUM 30 MG: 30 INJECTION, SOLUTION INTRAVENOUS; SUBCUTANEOUS at 10:32

## 2019-01-01 RX ADMIN — Medication 2000 UNITS: at 08:14

## 2019-01-01 RX ADMIN — DILTIAZEM HYDROCHLORIDE 5 MG: 5 INJECTION INTRAVENOUS at 11:36

## 2019-01-01 RX ADMIN — CEFEPIME 2 G: 2 INJECTION, POWDER, FOR SOLUTION INTRAVENOUS at 10:31

## 2019-01-01 RX ADMIN — DONEPEZIL HYDROCHLORIDE 10 MG: 5 TABLET, FILM COATED ORAL at 10:17

## 2019-01-01 RX ADMIN — ENOXAPARIN SODIUM 30 MG: 30 INJECTION, SOLUTION INTRAVENOUS; SUBCUTANEOUS at 10:07

## 2019-01-01 RX ADMIN — VANCOMYCIN HYDROCHLORIDE 500 MG: 10 INJECTION, POWDER, LYOPHILIZED, FOR SOLUTION INTRAVENOUS at 17:48

## 2019-01-01 RX ADMIN — PHENAZOPYRIDINE HYDROCHLORIDE 200 MG: 100 TABLET ORAL at 16:51

## 2019-01-01 RX ADMIN — GABAPENTIN 100 MG: 100 CAPSULE ORAL at 21:18

## 2019-01-01 RX ADMIN — CARBIDOPA AND LEVODOPA 1 TABLET: 25; 100 TABLET ORAL at 13:35

## 2019-01-01 RX ADMIN — PANTOPRAZOLE SODIUM 8 MG/HR: 40 INJECTION, POWDER, FOR SOLUTION INTRAVENOUS at 04:13

## 2019-01-01 RX ADMIN — HEPARIN SODIUM 5000 UNITS: 10000 INJECTION INTRAVENOUS; SUBCUTANEOUS at 13:30

## 2019-01-01 RX ADMIN — SODIUM CHLORIDE 1000 ML: 9 INJECTION, SOLUTION INTRAVENOUS at 13:05

## 2019-01-01 RX ADMIN — CLOPIDOGREL 75 MG: 75 TABLET, FILM COATED ORAL at 10:30

## 2019-01-01 RX ADMIN — DOXAZOSIN 2 MG: 2 TABLET ORAL at 10:24

## 2019-01-01 RX ADMIN — PANTOPRAZOLE SODIUM 40 MG: 40 INJECTION, POWDER, FOR SOLUTION INTRAVENOUS at 08:34

## 2019-01-01 RX ADMIN — CLOPIDOGREL 75 MG: 75 TABLET, FILM COATED ORAL at 10:09

## 2019-01-01 RX ADMIN — ASCORBIC ACID 1500 MG: 500 INJECTION, SOLUTION INTRAMUSCULAR; INTRAVENOUS; SUBCUTANEOUS at 08:31

## 2019-01-01 RX ADMIN — METRONIDAZOLE 500 MG: 500 INJECTION, SOLUTION INTRAVENOUS at 22:00

## 2019-01-01 RX ADMIN — CARBIDOPA AND LEVODOPA 1 TABLET: 25; 100 TABLET ORAL at 13:17

## 2019-01-01 RX ADMIN — ENOXAPARIN SODIUM 30 MG: 30 INJECTION, SOLUTION INTRAVENOUS; SUBCUTANEOUS at 10:24

## 2019-01-01 RX ADMIN — ETOMIDATE 20 MG: 2 INJECTION, SOLUTION INTRAVENOUS at 18:43

## 2019-01-01 RX ADMIN — SODIUM CHLORIDE: 9 INJECTION, SOLUTION INTRAVENOUS at 14:32

## 2019-01-01 RX ADMIN — GABAPENTIN 100 MG: 100 CAPSULE ORAL at 21:40

## 2019-01-01 RX ADMIN — SODIUM CHLORIDE 0.2 MCG/KG/HR: 9 INJECTION, SOLUTION INTRAVENOUS at 23:24

## 2019-01-01 RX ADMIN — HEPARIN SODIUM 5000 UNITS: 10000 INJECTION INTRAVENOUS; SUBCUTANEOUS at 21:52

## 2019-01-01 RX ADMIN — Medication 14 MCG/MIN: at 00:50

## 2019-01-01 RX ADMIN — MIRTAZAPINE 7.5 MG: 15 TABLET, FILM COATED ORAL at 21:35

## 2019-01-01 RX ADMIN — Medication 10 ML: at 08:36

## 2019-01-01 RX ADMIN — Medication 2000 UNITS: at 09:16

## 2019-01-01 RX ADMIN — LISINOPRIL 10 MG: 10 TABLET ORAL at 10:32

## 2019-01-01 RX ADMIN — PHENAZOPYRIDINE HYDROCHLORIDE 200 MG: 100 TABLET ORAL at 19:14

## 2019-01-01 RX ADMIN — LISINOPRIL 10 MG: 10 TABLET ORAL at 08:50

## 2019-01-01 RX ADMIN — PHENAZOPYRIDINE HYDROCHLORIDE 200 MG: 100 TABLET ORAL at 12:54

## 2019-01-01 RX ADMIN — PHENAZOPYRIDINE HYDROCHLORIDE 200 MG: 100 TABLET ORAL at 08:34

## 2019-01-01 RX ADMIN — MUPIROCIN: 20 OINTMENT TOPICAL at 20:49

## 2019-01-01 RX ADMIN — CARBIDOPA AND LEVODOPA 1 TABLET: 25; 100 TABLET ORAL at 10:09

## 2019-01-01 RX ADMIN — MEMANTINE HYDROCHLORIDE 5 MG: 5 TABLET, FILM COATED ORAL at 21:03

## 2019-01-01 RX ADMIN — Medication 10 ML: at 20:51

## 2019-01-01 RX ADMIN — ENALAPRILAT 1.25 MG: 1.25 INJECTION INTRAVENOUS at 00:17

## 2019-01-01 RX ADMIN — PROPRANOLOL HYDROCHLORIDE 20 MG: 20 TABLET ORAL at 10:08

## 2019-01-01 RX ADMIN — VANCOMYCIN HYDROCHLORIDE 500 MG: 10 INJECTION, POWDER, LYOPHILIZED, FOR SOLUTION INTRAVENOUS at 18:01

## 2019-01-01 RX ADMIN — DIGOXIN 250 MCG: 250 TABLET ORAL at 08:33

## 2019-01-01 RX ADMIN — MULTIPLE VITAMINS W/ MINERALS TAB 1 TABLET: TAB at 08:43

## 2019-01-01 RX ADMIN — WATER 2 G: 1 INJECTION INTRAMUSCULAR; INTRAVENOUS; SUBCUTANEOUS at 14:14

## 2019-01-01 RX ADMIN — DONEPEZIL HYDROCHLORIDE 10 MG: 5 TABLET, FILM COATED ORAL at 09:55

## 2019-01-01 RX ADMIN — SODIUM CHLORIDE: 9 INJECTION, SOLUTION INTRAVENOUS at 04:29

## 2019-01-01 RX ADMIN — Medication 10 ML: at 20:49

## 2019-01-01 RX ADMIN — ASCORBIC ACID 1500 MG: 500 INJECTION, SOLUTION INTRAMUSCULAR; INTRAVENOUS; SUBCUTANEOUS at 06:29

## 2019-01-01 RX ADMIN — FENTANYL CITRATE 100 MCG: 50 INJECTION, SOLUTION INTRAMUSCULAR; INTRAVENOUS at 19:45

## 2019-01-01 RX ADMIN — CLOPIDOGREL 75 MG: 75 TABLET, FILM COATED ORAL at 09:15

## 2019-01-01 RX ADMIN — PROPOFOL 20 MG: 10 INJECTION, EMULSION INTRAVENOUS at 14:44

## 2019-01-01 RX ADMIN — INSULIN LISPRO 8 UNITS: 100 INJECTION, SOLUTION INTRAVENOUS; SUBCUTANEOUS at 13:15

## 2019-01-01 RX ADMIN — PHENAZOPYRIDINE HYDROCHLORIDE 200 MG: 100 TABLET ORAL at 12:57

## 2019-01-01 RX ADMIN — PHENAZOPYRIDINE HYDROCHLORIDE 200 MG: 100 TABLET ORAL at 13:00

## 2019-01-01 RX ADMIN — Medication 2000 UNITS: at 08:50

## 2019-01-01 RX ADMIN — CARBIDOPA AND LEVODOPA 1 TABLET: 25; 100 TABLET ORAL at 08:35

## 2019-01-01 RX ADMIN — Medication 2000 UNITS: at 18:48

## 2019-01-01 RX ADMIN — MEMANTINE HYDROCHLORIDE 5 MG: 5 TABLET, FILM COATED ORAL at 22:13

## 2019-01-01 RX ADMIN — AMPICILLIN SODIUM 2 G: 2 INJECTION, POWDER, FOR SOLUTION INTRAMUSCULAR; INTRAVENOUS at 21:38

## 2019-01-01 RX ADMIN — AMPICILLIN SODIUM 2 G: 2 INJECTION, POWDER, FOR SOLUTION INTRAMUSCULAR; INTRAVENOUS at 22:07

## 2019-01-01 RX ADMIN — Medication 125 MCG/HR: at 16:58

## 2019-01-01 RX ADMIN — SODIUM CHLORIDE: 9 INJECTION, SOLUTION INTRAVENOUS at 08:35

## 2019-01-01 RX ADMIN — PANTOPRAZOLE SODIUM 40 MG: 40 INJECTION, POWDER, FOR SOLUTION INTRAVENOUS at 09:55

## 2019-01-01 RX ADMIN — ENOXAPARIN SODIUM 30 MG: 30 INJECTION, SOLUTION INTRAVENOUS; SUBCUTANEOUS at 08:14

## 2019-01-01 RX ADMIN — Medication 10 ML: at 01:11

## 2019-01-01 RX ADMIN — DOXAZOSIN 2 MG: 2 TABLET ORAL at 09:55

## 2019-01-01 RX ADMIN — CEFDINIR 300 MG: 300 CAPSULE ORAL at 08:35

## 2019-01-01 RX ADMIN — GABAPENTIN 100 MG: 100 CAPSULE ORAL at 08:34

## 2019-01-01 RX ADMIN — GABAPENTIN 100 MG: 100 CAPSULE ORAL at 09:59

## 2019-01-01 RX ADMIN — PROPOFOL 50 MG: 10 INJECTION, EMULSION INTRAVENOUS at 14:32

## 2019-01-01 RX ADMIN — Medication 10 ML: at 13:45

## 2019-01-01 RX ADMIN — CARBIDOPA AND LEVODOPA 1 TABLET: 25; 100 TABLET ORAL at 20:25

## 2019-01-01 RX ADMIN — PRAVASTATIN SODIUM 40 MG: 20 TABLET ORAL at 10:24

## 2019-01-01 RX ADMIN — HYDRALAZINE HYDROCHLORIDE 5 MG: 20 INJECTION INTRAMUSCULAR; INTRAVENOUS at 16:35

## 2019-01-01 RX ADMIN — CEFEPIME HYDROCHLORIDE 1 G: 1 INJECTION, POWDER, FOR SOLUTION INTRAMUSCULAR; INTRAVENOUS at 01:43

## 2019-01-01 RX ADMIN — DIGOXIN 250 MCG: 250 TABLET ORAL at 09:57

## 2019-01-01 RX ADMIN — METRONIDAZOLE 500 MG: 500 INJECTION, SOLUTION INTRAVENOUS at 13:59

## 2019-01-01 RX ADMIN — MULTIPLE VITAMINS W/ MINERALS TAB 1 TABLET: TAB at 08:14

## 2019-01-01 RX ADMIN — ASCORBIC ACID 1500 MG: 500 INJECTION, SOLUTION INTRAMUSCULAR; INTRAVENOUS; SUBCUTANEOUS at 01:11

## 2019-01-01 RX ADMIN — VANCOMYCIN HYDROCHLORIDE 500 MG: 10 INJECTION, POWDER, LYOPHILIZED, FOR SOLUTION INTRAVENOUS at 23:44

## 2019-01-01 RX ADMIN — VANCOMYCIN HYDROCHLORIDE 500 MG: 10 INJECTION, POWDER, LYOPHILIZED, FOR SOLUTION INTRAVENOUS at 06:29

## 2019-01-01 RX ADMIN — SODIUM CHLORIDE 1000 ML: 9 INJECTION, SOLUTION INTRAVENOUS at 05:19

## 2019-01-01 RX ADMIN — CARBIDOPA AND LEVODOPA 1 TABLET: 25; 100 TABLET ORAL at 13:49

## 2019-01-01 RX ADMIN — PANTOPRAZOLE SODIUM 40 MG: 40 INJECTION, POWDER, FOR SOLUTION INTRAVENOUS at 21:45

## 2019-01-01 RX ADMIN — HEPARIN SODIUM 5000 UNITS: 10000 INJECTION INTRAVENOUS; SUBCUTANEOUS at 05:55

## 2019-01-01 RX ADMIN — AMPICILLIN SODIUM 2 G: 2 INJECTION, POWDER, FOR SOLUTION INTRAMUSCULAR; INTRAVENOUS at 21:53

## 2019-01-01 RX ADMIN — MULTIPLE VITAMINS W/ MINERALS TAB 1 TABLET: TAB at 10:23

## 2019-01-01 RX ADMIN — PRAVASTATIN SODIUM 40 MG: 20 TABLET ORAL at 08:35

## 2019-01-01 RX ADMIN — DONEPEZIL HYDROCHLORIDE 10 MG: 5 TABLET, FILM COATED ORAL at 18:48

## 2019-01-01 RX ADMIN — VANCOMYCIN HYDROCHLORIDE 500 MG: 10 INJECTION, POWDER, LYOPHILIZED, FOR SOLUTION INTRAVENOUS at 23:58

## 2019-01-01 RX ADMIN — CEFEPIME HYDROCHLORIDE 2 G: 2 INJECTION, POWDER, FOR SOLUTION INTRAVENOUS at 15:05

## 2019-01-01 RX ADMIN — PHENAZOPYRIDINE HYDROCHLORIDE 200 MG: 100 TABLET ORAL at 16:45

## 2019-01-01 RX ADMIN — SODIUM CHLORIDE: 9 INJECTION, SOLUTION INTRAVENOUS at 05:36

## 2019-01-01 RX ADMIN — MEMANTINE HYDROCHLORIDE 5 MG: 5 TABLET, FILM COATED ORAL at 08:14

## 2019-01-01 RX ADMIN — WATER 2 G: 1 INJECTION INTRAMUSCULAR; INTRAVENOUS; SUBCUTANEOUS at 12:45

## 2019-01-01 RX ADMIN — DEXTROSE AND SODIUM CHLORIDE: 5; 900 INJECTION, SOLUTION INTRAVENOUS at 05:08

## 2019-01-01 RX ADMIN — SODIUM CHLORIDE: 9 INJECTION, SOLUTION INTRAVENOUS at 02:39

## 2019-01-01 RX ADMIN — ASCORBIC ACID 1500 MG: 500 INJECTION, SOLUTION INTRAMUSCULAR; INTRAVENOUS; SUBCUTANEOUS at 14:49

## 2019-01-01 RX ADMIN — Medication 25 MCG/HR: at 19:45

## 2019-01-01 RX ADMIN — GABAPENTIN 100 MG: 100 CAPSULE ORAL at 09:16

## 2019-01-01 RX ADMIN — Medication 2000 UNITS: at 09:56

## 2019-01-01 RX ADMIN — DEXTROSE MONOHYDRATE: 50 INJECTION, SOLUTION INTRAVENOUS at 07:02

## 2019-01-01 RX ADMIN — Medication 10 ML: at 08:58

## 2019-01-01 RX ADMIN — ASCORBIC ACID 1500 MG: 500 INJECTION, SOLUTION INTRAMUSCULAR; INTRAVENOUS; SUBCUTANEOUS at 00:08

## 2019-01-01 RX ADMIN — ACYCLOVIR SODIUM 590 MG: 50 INJECTION, SOLUTION INTRAVENOUS at 19:45

## 2019-01-01 RX ADMIN — SODIUM CHLORIDE 1000 ML: 9 INJECTION, SOLUTION INTRAVENOUS at 14:00

## 2019-01-01 RX ADMIN — Medication 10 ML: at 08:14

## 2019-01-01 RX ADMIN — FLUDROCORTISONE ACETATE 0.05 MG: 0.1 TABLET ORAL at 09:03

## 2019-01-01 RX ADMIN — PANTOPRAZOLE SODIUM 40 MG: 40 GRANULE, DELAYED RELEASE ORAL at 05:27

## 2019-01-01 RX ADMIN — AMPICILLIN SODIUM 2 G: 2 INJECTION, POWDER, FOR SOLUTION INTRAMUSCULAR; INTRAVENOUS at 13:27

## 2019-01-01 RX ADMIN — THIAMINE HYDROCHLORIDE 200 MG: 100 INJECTION, SOLUTION INTRAMUSCULAR; INTRAVENOUS at 09:06

## 2019-01-01 RX ADMIN — PHENAZOPYRIDINE HYDROCHLORIDE 200 MG: 100 TABLET ORAL at 13:27

## 2019-01-01 RX ADMIN — ASCORBIC ACID 1500 MG: 500 INJECTION, SOLUTION INTRAMUSCULAR; INTRAVENOUS; SUBCUTANEOUS at 05:54

## 2019-01-01 RX ADMIN — MEMANTINE HYDROCHLORIDE 5 MG: 5 TABLET, FILM COATED ORAL at 21:17

## 2019-01-01 RX ADMIN — PRAVASTATIN SODIUM 40 MG: 20 TABLET ORAL at 08:33

## 2019-01-01 RX ADMIN — BARIUM SULFATE 58 G: 960 POWDER, FOR SUSPENSION ORAL at 14:52

## 2019-01-01 RX ADMIN — ASCORBIC ACID 1500 MG: 500 INJECTION, SOLUTION INTRAMUSCULAR; INTRAVENOUS; SUBCUTANEOUS at 08:03

## 2019-01-01 RX ADMIN — PHENAZOPYRIDINE HYDROCHLORIDE 200 MG: 100 TABLET ORAL at 17:48

## 2019-01-01 RX ADMIN — Medication 100 MCG/HR: at 03:40

## 2019-01-01 RX ADMIN — Medication: at 20:49

## 2019-01-01 RX ADMIN — Medication 12 MCG/MIN: at 04:05

## 2019-01-01 RX ADMIN — CEFDINIR 300 MG: 300 CAPSULE ORAL at 21:04

## 2019-01-01 RX ADMIN — MUPIROCIN: 20 OINTMENT TOPICAL at 12:24

## 2019-01-01 RX ADMIN — SODIUM CHLORIDE 500 ML: 9 INJECTION, SOLUTION INTRAVENOUS at 08:59

## 2019-01-01 RX ADMIN — Medication 1 G: at 15:05

## 2019-01-01 RX ADMIN — PANTOPRAZOLE SODIUM 40 MG: 40 INJECTION, POWDER, FOR SOLUTION INTRAVENOUS at 10:24

## 2019-01-01 RX ADMIN — DEXTROSE AND SODIUM CHLORIDE: 5; 900 INJECTION, SOLUTION INTRAVENOUS at 16:01

## 2019-01-01 RX ADMIN — MEMANTINE HYDROCHLORIDE 5 MG: 5 TABLET, FILM COATED ORAL at 22:31

## 2019-01-01 RX ADMIN — PHENAZOPYRIDINE HYDROCHLORIDE 200 MG: 100 TABLET ORAL at 15:18

## 2019-01-01 RX ADMIN — VANCOMYCIN HYDROCHLORIDE 500 MG: 10 INJECTION, POWDER, LYOPHILIZED, FOR SOLUTION INTRAVENOUS at 23:56

## 2019-01-01 RX ADMIN — CARBIDOPA AND LEVODOPA 1 TABLET: 25; 100 TABLET ORAL at 08:43

## 2019-01-01 RX ADMIN — PHENAZOPYRIDINE HYDROCHLORIDE 200 MG: 100 TABLET ORAL at 13:35

## 2019-01-01 RX ADMIN — Medication: at 20:22

## 2019-01-01 RX ADMIN — PHENAZOPYRIDINE HYDROCHLORIDE 200 MG: 100 TABLET ORAL at 16:39

## 2019-01-01 RX ADMIN — AMPICILLIN SODIUM 2 G: 2 INJECTION, POWDER, FOR SOLUTION INTRAMUSCULAR; INTRAVENOUS at 13:33

## 2019-01-01 RX ADMIN — Medication 10 ML: at 09:55

## 2019-01-01 RX ADMIN — HEPARIN SODIUM 5000 UNITS: 10000 INJECTION INTRAVENOUS; SUBCUTANEOUS at 21:50

## 2019-01-01 RX ADMIN — DEXTROSE MONOHYDRATE 50 G: 500 INJECTION PARENTERAL at 04:38

## 2019-01-01 RX ADMIN — ASCORBIC ACID 1500 MG: 500 INJECTION, SOLUTION INTRAMUSCULAR; INTRAVENOUS; SUBCUTANEOUS at 07:55

## 2019-01-01 RX ADMIN — MULTIVITAMIN TABLET 1 TABLET: TABLET at 10:16

## 2019-01-01 RX ADMIN — Medication 1 CAPSULE: at 08:59

## 2019-01-01 RX ADMIN — CEFDINIR 300 MG: 300 CAPSULE ORAL at 21:40

## 2019-01-01 RX ADMIN — VASOPRESSIN 0.03 UNITS/MIN: 20 INJECTION INTRAVENOUS at 11:26

## 2019-01-01 RX ADMIN — DOXAZOSIN 2 MG: 2 TABLET ORAL at 08:50

## 2019-01-01 RX ADMIN — METRONIDAZOLE 500 MG: 500 INJECTION, SOLUTION INTRAVENOUS at 14:14

## 2019-01-01 RX ADMIN — ESMOLOL HYDROCHLORIDE 20 MG: 10 INJECTION, SOLUTION INTRAVENOUS at 14:35

## 2019-01-01 RX ADMIN — MEMANTINE HYDROCHLORIDE 5 MG: 5 TABLET, FILM COATED ORAL at 20:36

## 2019-01-01 RX ADMIN — DIGOXIN 250 MCG: 250 TABLET ORAL at 09:15

## 2019-01-01 RX ADMIN — HALOPERIDOL LACTATE 2 MG: 5 INJECTION INTRAMUSCULAR at 19:58

## 2019-01-01 RX ADMIN — THIAMINE HYDROCHLORIDE 200 MG: 100 INJECTION, SOLUTION INTRAMUSCULAR; INTRAVENOUS at 09:03

## 2019-01-01 RX ADMIN — PHENAZOPYRIDINE HYDROCHLORIDE 200 MG: 100 TABLET ORAL at 12:45

## 2019-01-01 RX ADMIN — CARBIDOPA AND LEVODOPA 1 TABLET: 25; 100 TABLET ORAL at 08:50

## 2019-01-01 RX ADMIN — HEPARIN SODIUM 5000 UNITS: 10000 INJECTION INTRAVENOUS; SUBCUTANEOUS at 22:01

## 2019-01-01 RX ADMIN — DEXTROSE MONOHYDRATE: 50 INJECTION, SOLUTION INTRAVENOUS at 15:42

## 2019-01-01 RX ADMIN — PANTOPRAZOLE SODIUM 40 MG: 40 INJECTION, POWDER, FOR SOLUTION INTRAVENOUS at 08:14

## 2019-01-01 RX ADMIN — Medication 10 ML: at 18:41

## 2019-01-01 RX ADMIN — GABAPENTIN 100 MG: 100 CAPSULE ORAL at 08:49

## 2019-01-01 RX ADMIN — Medication 10 ML: at 08:50

## 2019-01-01 RX ADMIN — ASPIRIN 81 MG: 81 TABLET ORAL at 08:15

## 2019-01-01 RX ADMIN — ASCORBIC ACID 1500 MG: 500 INJECTION, SOLUTION INTRAMUSCULAR; INTRAVENOUS; SUBCUTANEOUS at 11:59

## 2019-01-01 RX ADMIN — SODIUM CHLORIDE 500 ML: 9 INJECTION, SOLUTION INTRAVENOUS at 15:08

## 2019-01-01 RX ADMIN — METRONIDAZOLE 500 MG: 500 INJECTION, SOLUTION INTRAVENOUS at 05:34

## 2019-01-01 RX ADMIN — MULTIPLE VITAMINS W/ MINERALS TAB 1 TABLET: TAB at 18:48

## 2019-01-01 RX ADMIN — MIRTAZAPINE 7.5 MG: 15 TABLET, FILM COATED ORAL at 22:12

## 2019-01-01 RX ADMIN — PANTOPRAZOLE SODIUM 40 MG: 40 INJECTION, POWDER, FOR SOLUTION INTRAVENOUS at 09:03

## 2019-01-01 RX ADMIN — GABAPENTIN 100 MG: 100 CAPSULE ORAL at 21:05

## 2019-01-01 RX ADMIN — DEXTROSE MONOHYDRATE 25 G: 25 INJECTION, SOLUTION INTRAVENOUS at 14:45

## 2019-01-01 RX ADMIN — Medication 1000 ML: at 14:00

## 2019-01-01 RX ADMIN — SODIUM CHLORIDE: 9 INJECTION, SOLUTION INTRAVENOUS at 01:43

## 2019-01-01 RX ADMIN — CLOPIDOGREL 75 MG: 75 TABLET, FILM COATED ORAL at 08:50

## 2019-01-01 RX ADMIN — HYDROCORTISONE SODIUM SUCCINATE 50 MG: 100 INJECTION, POWDER, FOR SOLUTION INTRAMUSCULAR; INTRAVENOUS at 06:07

## 2019-01-01 RX ADMIN — ENOXAPARIN SODIUM 40 MG: 40 INJECTION SUBCUTANEOUS at 09:03

## 2019-01-01 RX ADMIN — HYDROCORTISONE SODIUM SUCCINATE 50 MG: 100 INJECTION, POWDER, FOR SOLUTION INTRAMUSCULAR; INTRAVENOUS at 13:11

## 2019-01-01 RX ADMIN — HYDROCORTISONE SODIUM SUCCINATE 50 MG: 100 INJECTION, POWDER, FOR SOLUTION INTRAMUSCULAR; INTRAVENOUS at 18:42

## 2019-01-01 RX ADMIN — DONEPEZIL HYDROCHLORIDE 10 MG: 5 TABLET, FILM COATED ORAL at 09:15

## 2019-01-01 RX ADMIN — PRAVASTATIN SODIUM 40 MG: 20 TABLET ORAL at 20:36

## 2019-01-01 RX ADMIN — HEPARIN SODIUM 5000 UNITS: 10000 INJECTION INTRAVENOUS; SUBCUTANEOUS at 06:07

## 2019-01-01 RX ADMIN — CARBIDOPA AND LEVODOPA 1 TABLET: 25; 100 TABLET ORAL at 21:40

## 2019-01-01 RX ADMIN — Medication 5 ML: at 08:38

## 2019-01-01 RX ADMIN — PHENAZOPYRIDINE HYDROCHLORIDE 200 MG: 100 TABLET ORAL at 08:15

## 2019-01-01 RX ADMIN — Medication 10 ML: at 10:34

## 2019-01-01 RX ADMIN — ASCORBIC ACID 1500 MG: 500 INJECTION, SOLUTION INTRAMUSCULAR; INTRAVENOUS; SUBCUTANEOUS at 14:07

## 2019-01-01 RX ADMIN — PANTOPRAZOLE SODIUM 40 MG: 40 INJECTION, POWDER, FOR SOLUTION INTRAVENOUS at 09:00

## 2019-01-01 RX ADMIN — HYDROCORTISONE SODIUM SUCCINATE 50 MG: 100 INJECTION, POWDER, FOR SOLUTION INTRAMUSCULAR; INTRAVENOUS at 06:31

## 2019-01-01 RX ADMIN — PROPOFOL 30 MG: 10 INJECTION, EMULSION INTRAVENOUS at 14:34

## 2019-01-01 RX ADMIN — HEPARIN SODIUM 5000 UNITS: 10000 INJECTION INTRAVENOUS; SUBCUTANEOUS at 22:08

## 2019-01-01 RX ADMIN — DOXAZOSIN 2 MG: 2 TABLET ORAL at 08:34

## 2019-01-01 RX ADMIN — PRAVASTATIN SODIUM 40 MG: 20 TABLET ORAL at 01:09

## 2019-01-01 RX ADMIN — HYDRALAZINE HYDROCHLORIDE 10 MG: 20 INJECTION, SOLUTION INTRAMUSCULAR; INTRAVENOUS at 16:08

## 2019-01-01 ASSESSMENT — ENCOUNTER SYMPTOMS
EYE DISCHARGE: 0
EYE REDNESS: 0
COUGH: 0
WHEEZING: 0
HEMATOCHEZIA: 0
NAUSEA: 0
BACK PAIN: 0
ABDOMINAL PAIN: 0
EYE PAIN: 0
SHORTNESS OF BREATH: 0
SINUS PRESSURE: 0
WHEEZING: 0
SHORTNESS OF BREATH: 0
ABDOMINAL PAIN: 0
DIARRHEA: 0
NAUSEA: 0
COUGH: 0
DIARRHEA: 0
BACK PAIN: 0
EYE REDNESS: 0
EYE DISCHARGE: 0
SINUS PRESSURE: 0
SORE THROAT: 0
SORE THROAT: 0
EYE PAIN: 0
VOMITING: 0
VOMITING: 0

## 2019-01-01 ASSESSMENT — PAIN SCALES - PAIN ASSESSMENT IN ADVANCED DEMENTIA (PAINAD)
BODYLANGUAGE: 1
NEGVOCALIZATION: 0
BODYLANGUAGE: 0
FACIALEXPRESSION: 0
CONSOLABILITY: 0
CONSOLABILITY: 0
NEGVOCALIZATION: 0
FACIALEXPRESSION: 0
TOTALSCORE: 0
NEGVOCALIZATION: 0
BREATHING: 0
BODYLANGUAGE: 0
TOTALSCORE: 0
BREATHING: 1
CONSOLABILITY: 0
TOTALSCORE: 0
CONSOLABILITY: 0
BODYLANGUAGE: 0
BREATHING: 0
BODYLANGUAGE: 0
CONSOLABILITY: 0
CONSOLABILITY: 0
NEGVOCALIZATION: 0
TOTALSCORE: 0
BODYLANGUAGE: 0
FACIALEXPRESSION: 0
FACIALEXPRESSION: 0
NEGVOCALIZATION: 0
BREATHING: 0
NEGVOCALIZATION: 0
BREATHING: 0
BODYLANGUAGE: 0
CONSOLABILITY: 0
TOTALSCORE: 0
NEGVOCALIZATION: 0
CONSOLABILITY: 0
BREATHING: 0
TOTALSCORE: 0
FACIALEXPRESSION: 0
BODYLANGUAGE: 0
NEGVOCALIZATION: 0
BODYLANGUAGE: 0
CONSOLABILITY: 0
CONSOLABILITY: 1
CONSOLABILITY: 0
FACIALEXPRESSION: 0
TOTALSCORE: 0
BODYLANGUAGE: 0
TOTALSCORE: 0
BREATHING: 0
FACIALEXPRESSION: 0
BODYLANGUAGE: 0
FACIALEXPRESSION: 0
FACIALEXPRESSION: 0
NEGVOCALIZATION: 0
FACIALEXPRESSION: 0
FACIALEXPRESSION: 0
BODYLANGUAGE: 0
NEGVOCALIZATION: 0
CONSOLABILITY: 0
FACIALEXPRESSION: 0
FACIALEXPRESSION: 0
NEGVOCALIZATION: 0
TOTALSCORE: 0
BREATHING: 0
BODYLANGUAGE: 0
BODYLANGUAGE: 0
CONSOLABILITY: 0
CONSOLABILITY: 0
TOTALSCORE: 0
CONSOLABILITY: 0
FACIALEXPRESSION: 0
TOTALSCORE: 0
BREATHING: 0
NEGVOCALIZATION: 0
CONSOLABILITY: 0
BODYLANGUAGE: 0
FACIALEXPRESSION: 0
NEGVOCALIZATION: 1
TOTALSCORE: 0
BODYLANGUAGE: 0
BODYLANGUAGE: 0
NEGVOCALIZATION: 0
BREATHING: 0
BODYLANGUAGE: 0
CONSOLABILITY: 0
NEGVOCALIZATION: 0
NEGVOCALIZATION: 0
TOTALSCORE: 5
BODYLANGUAGE: 0
CONSOLABILITY: 0
BREATHING: 0
BREATHING: 0
FACIALEXPRESSION: 0
TOTALSCORE: 0
BREATHING: 0
CONSOLABILITY: 0
TOTALSCORE: 0
TOTALSCORE: 0
NEGVOCALIZATION: 0
BREATHING: 0
FACIALEXPRESSION: 0
TOTALSCORE: 0
TOTALSCORE: 0
CONSOLABILITY: 0
BREATHING: 0
CONSOLABILITY: 0
TOTALSCORE: 0
NEGVOCALIZATION: 0
BREATHING: 0
CONSOLABILITY: 0
TOTALSCORE: 0
TOTALSCORE: 0
CONSOLABILITY: 0
TOTALSCORE: 0
NEGVOCALIZATION: 0
BREATHING: 0
NEGVOCALIZATION: 0
BODYLANGUAGE: 0
BODYLANGUAGE: 0
NEGVOCALIZATION: 0
NEGVOCALIZATION: 0
FACIALEXPRESSION: 0
BREATHING: 0
BODYLANGUAGE: 0
TOTALSCORE: 0
BREATHING: 0
BREATHING: 0
NEGVOCALIZATION: 0
NEGVOCALIZATION: 0
CONSOLABILITY: 0
NEGVOCALIZATION: 0
BREATHING: 0
BODYLANGUAGE: 0
NEGVOCALIZATION: 0
TOTALSCORE: 0
FACIALEXPRESSION: 0
BODYLANGUAGE: 0
FACIALEXPRESSION: 0
FACIALEXPRESSION: 0
BREATHING: 0
CONSOLABILITY: 0
FACIALEXPRESSION: 0
BREATHING: 0
FACIALEXPRESSION: 0
FACIALEXPRESSION: 0
CONSOLABILITY: 0
FACIALEXPRESSION: 1
FACIALEXPRESSION: 0
BREATHING: 0
BODYLANGUAGE: 0
BODYLANGUAGE: 0
TOTALSCORE: 0
TOTALSCORE: 0

## 2019-01-01 ASSESSMENT — PULMONARY FUNCTION TESTS
PIF_VALUE: 12
PIF_VALUE: 17
PIF_VALUE: 20
PIF_VALUE: 24
PIF_VALUE: 8
PIF_VALUE: 16
PIF_VALUE: 15
PIF_VALUE: 0
PIF_VALUE: 20
PIF_VALUE: 11
PIF_VALUE: 18
PIF_VALUE: 12
PIF_VALUE: 0
PIF_VALUE: 21
PIF_VALUE: 17
PIF_VALUE: 14
PIF_VALUE: 12
PIF_VALUE: 0
PIF_VALUE: 31
PIF_VALUE: 0
PIF_VALUE: 22
PIF_VALUE: 18
PIF_VALUE: 19
PIF_VALUE: 16
PIF_VALUE: 13
PIF_VALUE: 11
PIF_VALUE: 24
PIF_VALUE: 19
PIF_VALUE: 16
PIF_VALUE: 12
PIF_VALUE: 12
PIF_VALUE: 18
PIF_VALUE: 20
PIF_VALUE: 21
PIF_VALUE: 21
PIF_VALUE: 13
PIF_VALUE: 15
PIF_VALUE: 10
PIF_VALUE: 0
PIF_VALUE: 19
PIF_VALUE: 20
PIF_VALUE: 27
PIF_VALUE: 8
PIF_VALUE: 0
PIF_VALUE: 13
PIF_VALUE: 19
PIF_VALUE: 18
PIF_VALUE: 20
PIF_VALUE: 16
PIF_VALUE: 0
PIF_VALUE: 14
PIF_VALUE: 10
PIF_VALUE: 16
PIF_VALUE: 11
PIF_VALUE: 12
PIF_VALUE: 25
PIF_VALUE: 15
PIF_VALUE: 17
PIF_VALUE: 23
PIF_VALUE: 12
PIF_VALUE: 1
PIF_VALUE: 15
PIF_VALUE: 17
PIF_VALUE: 15
PIF_VALUE: 19
PIF_VALUE: 21
PIF_VALUE: 31
PIF_VALUE: 16
PIF_VALUE: 0
PIF_VALUE: 21
PIF_VALUE: 18
PIF_VALUE: 17
PIF_VALUE: 18
PIF_VALUE: 17
PIF_VALUE: 17
PIF_VALUE: 24
PIF_VALUE: 15
PIF_VALUE: 12
PIF_VALUE: 13
PIF_VALUE: 0
PIF_VALUE: 18
PIF_VALUE: 8
PIF_VALUE: 21
PIF_VALUE: 19
PIF_VALUE: 16
PIF_VALUE: 0
PIF_VALUE: 16
PIF_VALUE: 16
PIF_VALUE: 11
PIF_VALUE: 22
PIF_VALUE: 0
PIF_VALUE: 15
PIF_VALUE: 0
PIF_VALUE: 16
PIF_VALUE: 12
PIF_VALUE: 17
PIF_VALUE: 18
PIF_VALUE: 16
PIF_VALUE: 13
PIF_VALUE: 16
PIF_VALUE: 12
PIF_VALUE: 21
PIF_VALUE: 16
PIF_VALUE: 21
PIF_VALUE: 18
PIF_VALUE: 10
PIF_VALUE: 13
PIF_VALUE: 15
PIF_VALUE: 18
PIF_VALUE: 0
PIF_VALUE: 22
PIF_VALUE: 0
PIF_VALUE: 15
PIF_VALUE: 16
PIF_VALUE: 21
PIF_VALUE: 8
PIF_VALUE: 17
PIF_VALUE: 10
PIF_VALUE: 17
PIF_VALUE: 13
PIF_VALUE: 15
PIF_VALUE: 12
PIF_VALUE: 17
PIF_VALUE: 22
PIF_VALUE: 21
PIF_VALUE: 11
PIF_VALUE: 16
PIF_VALUE: 11
PIF_VALUE: 17
PIF_VALUE: 20
PIF_VALUE: 15
PIF_VALUE: 15
PIF_VALUE: 14
PIF_VALUE: 13
PIF_VALUE: 14
PIF_VALUE: 22
PIF_VALUE: 9
PIF_VALUE: 15
PIF_VALUE: 13
PIF_VALUE: 16
PIF_VALUE: 17
PIF_VALUE: 21
PIF_VALUE: 13
PIF_VALUE: 22
PIF_VALUE: 8
PIF_VALUE: 13
PIF_VALUE: 16
PIF_VALUE: 20
PIF_VALUE: 0
PIF_VALUE: 13
PIF_VALUE: 16
PIF_VALUE: 17
PIF_VALUE: 17
PIF_VALUE: 19
PIF_VALUE: 22
PIF_VALUE: 21
PIF_VALUE: 10
PIF_VALUE: 14
PIF_VALUE: 14
PIF_VALUE: 17
PIF_VALUE: 25
PIF_VALUE: 0
PIF_VALUE: 0
PIF_VALUE: 15
PIF_VALUE: 17
PIF_VALUE: 11
PIF_VALUE: 34
PIF_VALUE: 13
PIF_VALUE: 19
PIF_VALUE: 18
PIF_VALUE: 17
PIF_VALUE: 18
PIF_VALUE: 17
PIF_VALUE: 31
PIF_VALUE: 8
PIF_VALUE: 15
PIF_VALUE: 18
PIF_VALUE: 24
PIF_VALUE: 15
PIF_VALUE: 21
PIF_VALUE: 14
PIF_VALUE: 9
PIF_VALUE: 13
PIF_VALUE: 0
PIF_VALUE: 18
PIF_VALUE: 14
PIF_VALUE: 13
PIF_VALUE: 14
PIF_VALUE: 16
PIF_VALUE: 0
PIF_VALUE: 13
PIF_VALUE: 17
PIF_VALUE: 14
PIF_VALUE: 13
PIF_VALUE: 14
PIF_VALUE: 14
PIF_VALUE: 0
PIF_VALUE: 11
PIF_VALUE: 17
PIF_VALUE: 16
PIF_VALUE: 8
PIF_VALUE: 20
PIF_VALUE: 14
PIF_VALUE: 0
PIF_VALUE: 13
PIF_VALUE: 16
PIF_VALUE: 16
PIF_VALUE: 9
PIF_VALUE: 18
PIF_VALUE: 25
PIF_VALUE: 8
PIF_VALUE: 20
PIF_VALUE: 9
PIF_VALUE: 12
PIF_VALUE: 7

## 2019-01-01 ASSESSMENT — PAIN SCALES - GENERAL
PAINLEVEL_OUTOF10: 0
PAINLEVEL_OUTOF10: 2
PAINLEVEL_OUTOF10: 0
PAINLEVEL_OUTOF10: 2
PAINLEVEL_OUTOF10: 0

## 2019-01-01 ASSESSMENT — PAIN SCALES - WONG BAKER
WONGBAKER_NUMERICALRESPONSE: 0

## 2019-04-24 NOTE — PATIENT INSTRUCTIONS
Continue daily fractionated radiation therapy as scheduled. Please see weekly OTV note and intial consultation letter in [facility] for clinical details. Kishore Sagastume.  Song Duckworth MD [address] [place] [address]  Radiation Oncology  [place]:  [phone]   FAX:    [phone]: [address] [place] [address] Road:  [phone]

## 2019-04-24 NOTE — PROGRESS NOTES
Wt Readings from Last 3 Encounters:   04/24/19 129 lb (58.5 kg)   02/25/19 133 lb 3.2 oz (60.4 kg)   01/31/19 132 lb 6.4 oz (60.1 kg)         ASSESSMENT/PLAN:     Patient is tolerating treatments well with expected toxicities. RBA were reviewed prior to first fraction and PRN. Current and planned dose reviewed. Goals of treatment and potential side effects were reviewed with the patient PRN. Treatment imaging has been personally reviewed for accuracy and precision. Questions answered to apparent satisfaction. Treatments will continue as planned.        -will hydrate THUR / MON        Valente Coley MD MS DABR  Radiation Oncologist        [facility] ([address] [place] [address]): [phone] /// FAX: [phone]  [facility]): [phone] /// FAX: [phone]  [address] [place] [address]): [phone] /// FAX: [phone]

## 2019-05-01 NOTE — PROGRESS NOTES
Bennie Kevin.  5/1/2019  Wt Readings from Last 3 Encounters:   04/24/19 129 lb (58.5 kg)   02/25/19 133 lb 3.2 oz (60.4 kg)   01/31/19 132 lb 6.4 oz (60.1 kg)     There is no height or weight on file to calculate BMI. Treatment Area:right upper lung lesion    Patient was seen today for weekly visit. Comfort Alteration  KPS:60%  Fatigue: Mild    Ventilation Alterations  Cough: Yes  Hemoptysis: No  Mucus Color: na  Dyspnea: Yes  O2 Sat: 97%    Nutritional Alteration  Anorexia: No  Nausea: No   Vomiting: No     Skin Alteration   Sensation:na    Radiation Dermatitis:  na    Mucous Membrane Alteration  Voice Changes/ Stridor/Larynx: yes, patient sounds congested today  Pharynx & Esophagus: na    Elimination Alterations  Constipation: no  Diarrhea:  no      Emotional  Coping: effective      Injury, potential bleeding or infection: na    Other:na    Lab Results   Component Value Date    WBC 6.7 04/04/2018     04/04/2018         There were no vitals taken for this visit. BP within normal range? yes       Assessment/Plan:  Patient has completed 8/10 fractions, 4000 cGy/5000.   Akshat Riley

## 2019-05-01 NOTE — PROGRESS NOTES
DEPARTMENT OF RADIATION ONCOLOGY ON TREATMENT VISIT         5/1/2019      NAME:  Romero Huitorn. YOB: 1933      Diagnosis:  RUL / RML --- re-irradiation      SUBJECTIVE:   Romero Huitron. has now received 4000 cGy in 8/10 fractions directed to the right lung with a stereotactic technique. Past medical, surgical, social and family histories reviewed and updated as indicated. Pain: controlled      ALLERGIES:  Fexofenadine-pseudoephed er         Current Outpatient Medications   Medication Sig Dispense Refill    clopidogrel (PLAVIX) 75 MG tablet Take 75 mg by mouth daily      mirtazapine (REMERON) 7.5 MG tablet Take 7.5 mg by mouth nightly      gabapentin (NEURONTIN) 100 MG capsule Take 300 mg by mouth nightly. Shiraz Shukla acetaminophen (TYLENOL) 325 MG tablet Take 650 mg by mouth every 6 hours as needed for Pain      Memantine HCl (NAMENDA PO) Take 5 mg by mouth 2 times daily       Donepezil HCl (ARICEPT PO) Take by mouth      propranolol (INDERAL) 20 MG tablet Take 20 mg by mouth 2 times daily      CARBIDOPA PO Take  mg by mouth three times daily       hyoscyamine (LEVBID) 0.375 MG CR tablet Take 0.375 mg by mouth every 12 hours       Vitamin D (CHOLECALCIFEROL) 1000 UNITS CAPS capsule Take 2,000 Units by mouth daily       digoxin (LANOXIN) 0.25 MG tablet Take 250 mcg by mouth daily.  pravastatin (PRAVACHOL) 40 MG tablet Take 40 mg by mouth daily.  aspirin 81 MG EC tablet Take 1 tablet by mouth daily. 30 tablet 0    doxazosin (CARDURA) 1 MG tablet   Take 2 mg by mouth daily       lisinopril (PRINIVIL;ZESTRIL) 20 MG tablet Take 10 mg by mouth daily       Multiple Vitamins-Minerals (TREVOR MULTIVITAMIN FOR MEN PO) Take  by mouth. No current facility-administered medications for this encounter. OBJECTIVE:  Alert and fully ambulatory. Pleasant and conversant.       Physical Examination: General appearance - alert, well appearing,

## 2019-05-03 NOTE — PROGRESS NOTES
Radiation Oncology            -call pt for scheduling hydration MON 5/6/19    -discussed OP water intake and compliance    -no other Sx. Gonzalo Hyatt.  Maximino Zamorano MD [place] [address] Oncology  Cell: [phone]    [facility]:  [phone]   FAX: [phone]  [facility]:  [address] [place] [address] Avenue:    [phone]  [address] [place] [address] Road:  [address] [place] [address] Road:  [phone]

## 2019-05-07 NOTE — TELEPHONE ENCOUNTER
Patient's daughter called to request fluids as discussed with Dr Shakira Cabello last week at conclusion of treatment-nursing will schedule IV hydration tomorrow at the latest available administration time.

## 2019-05-08 NOTE — PROGRESS NOTES
Patient tolerated infusion well without complaint. IV site removed and site looks good. Patient discharged via wheelchair.

## 2019-06-09 NOTE — ED NOTES
Bed: 12  Expected date:   Expected time:   Means of arrival:   Comments:  hOLD ems     Rosezella Burkitt, RN  06/08/19 [id number]

## 2019-06-09 NOTE — ED PROVIDER NOTES
49-year-old male history of atrial fibrillation Parkinson's disease chronic hip pain associated with a right hip fracture, and lung cancer presents emergency department after he coming very weak especially in his lower extremities. Patient had a CT noncontrast scan at [place]. was performed and this afternoon and then went to [place] and was walking around at the area. He went home and then went to the bathroom and had trouble getting up. Family states they were unable to get him up to walk which is a they're worsening complication for him. The family does state he does have some difficulty walking and gets weak like this but this is more severe than normal. The patient states no lower surgery pain or back pain. EMS was concerned because another noted some mild slurring of speech with facial droop. The family states this is not a new thing for him. Patient denies fevers or chills chest pain shortness of breath palpitations nausea vomiting diarrhea or constipation. The history is provided by the patient. Fatigue   Severity:  Moderate  Onset quality:  Gradual  Duration:  6 hours  Timing:  Constant  Progression:  Waxing and waning  Chronicity:  Recurrent  Relieved by:  Nothing  Worsened by:  Nothing  Ineffective treatments:  None tried  Associated symptoms: difficulty walking    Associated symptoms: no abdominal pain, no anorexia, no aphasia, no arthralgias, no ataxia, no chest pain, no cough, no diarrhea, no dizziness, no dysphagia, no dysuria, no numbness in extremities, no fever, no foul-smelling urine, no frequency, no headaches, no hematochezia, no lethargy, no melena, no myalgias, no nausea, no seizures, no shortness of breath, no syncope, no urgency and no vomiting        Review of Systems   Constitutional: Positive for fatigue. Negative for chills and fever. HENT: Negative for ear pain, sinus pressure and sore throat. Eyes: Negative for pain, discharge and redness.    Respiratory: Negative for cough, shortness of breath and wheezing. Cardiovascular: Negative for chest pain and syncope. Gastrointestinal: Negative for abdominal pain, anorexia, diarrhea, dysphagia, hematochezia, melena, nausea and vomiting. Genitourinary: Negative for dysuria, frequency and urgency. Musculoskeletal: Negative for arthralgias, back pain and myalgias. Skin: Negative for rash and wound. Neurological: Positive for weakness. Negative for dizziness, seizures and headaches. Hematological: Negative for adenopathy. All other systems reviewed and are negative. Physical Exam   Constitutional: He is oriented to person, place, and time. He appears well-developed and well-nourished. No distress. HENT:   Head: Normocephalic and atraumatic. Eyes: Pupils are equal, round, and reactive to light. EOM are normal.   Neck: Normal range of motion. Neck supple. Cardiovascular: Normal rate and regular rhythm. Pulmonary/Chest: Effort normal and breath sounds normal.   Abdominal: Soft. Bowel sounds are normal.   Musculoskeletal: Normal range of motion. He exhibits no edema. Neurological: He is alert and oriented to person, place, and time. He displays normal reflexes. No cranial nerve deficit. He exhibits normal muscle tone. NIH Stroke Scale/Score at time of initial evaluation:  1A: Level of Consciousness 0 - alert; keenly responsive   1B: Ask Month and Age 0 - answers both questions correctly   1C:  Tell Patient To Open and Close Eyes, then Hand  Squeeze 0 - performs both tasks correctly   2: Test Horizontal Extraocular Movements 0 - normal   3: Test Visual Fields 0 - no visual loss   4: Test Facial Palsy 1 - minor paralysis (flattened nasolabial fold, asymmetric on smiling) (family states this is not a new finding)   5A: Test Left Arm Motor Drift 0 - no drift, limb holds 90 (or 45) degrees for full 10 seconds   5B: Test Right Arm Motor Drift 0 - no drift, limb holds 90 (or 45) degrees for full 10 seconds   6A: Test Left Leg Motor Drift 0 - no drift; leg holds 30 degree position for full 5 seconds   6B: Test Right Leg Motor Drift 0 - no drift; leg holds 30 degree position for full 5 seconds   7: Test Limb Ataxia   (FNF/Heel-Shin) 0 - absent   8: Test Sensation 0 - normal; no sensory loss   9: Test Language/Aphasia 0 - no aphasia, normal   10: Test Dysarthria 0 - normal   11: Test Extinction/Inattention 0 - no abnormality   Total Score: 1   6/8/19 at 10:28 PM.    Procedures    [facility]    ED Course as of Jun 09 0239   Sat Jun 08, 2019   [facility] Patient seen and examined. Labs and imaging have been ordered. While patient does have mild facial droop this appears to be a chronic finding according to family. CVA, dehydration, head bleed, among other pathology are considered. [CF]   Sun Jun 09, 2019   0019 Patient given antihypertensives for hypertension. Repeat evaluation of potassium with i-STAT once fluids been completed as patient's potassium is 5.7.    [CF]   0212 , IV fluids patient's potassium has improved to 4.6. Patient's blood pressure has improved with antihypertensive. Patient will be ambulated to determine if patient is safe for discharge. [CF]   1872 Patient was able ambulate with a walker at this time. At this point he is felt safe for discharge and recommended to follow-up with Dr. Wing Cintron. Patient is encouraged to continue with hydration. [CF]      ED Course User Index  [CF] Lara Bolton, DO     --------------------------------------------- PAST HISTORY ---------------------------------------------  Past Medical History:  has a past medical history of Arthritis, Atherosclerosis of native arteries of right leg with ulceration of other part of foot ([facility] ([facility] Carotid artery stenosis, Dementia, HIGH CHOLESTEROL, Hypertension, Parkinson disease ([facility] and Peripheral vascular disease ([facility]     Past Surgical History:  has a past surgical history that includes Cardiac surgery; hernia repair; eye surgery; 3.9 3.5 - 5.2 g/dL    Total Bilirubin 0.3 0.0 - 1.2 mg/dL    Alkaline Phosphatase 91 40 - 129 U/L    ALT 10 0 - 40 U/L    AST 29 0 - 39 U/L   Troponin   Result Value Ref Range    Troponin <0.01 0.00 - 0.03 ng/mL   Urinalysis   Result Value Ref Range    Color, UA Yellow Straw/Yellow    Clarity, UA Clear Clear    Glucose, Ur Negative Negative mg/dL    Bilirubin Urine Negative Negative    Ketones, Urine Negative Negative mg/dL    Specific Gravity, UA <=1.005 1.005 - 1.030    Blood, Urine Negative Negative    pH, UA 6.0 5.0 - 9.0    Protein, UA Negative Negative mg/dL    Urobilinogen, Urine 0.2 <2.0 E.U./dL    Nitrite, Urine Negative Negative    Leukocyte Esterase, Urine TRACE (A) Negative   Lactic Acid, Plasma   Result Value Ref Range    Lactic Acid 0.8 0.5 - 2.2 mmol/L   Microscopic Urinalysis   Result Value Ref Range    WBC, UA 0-1 0 - 5 /HPF    RBC, UA 0-1 0 - 2 /HPF    Bacteria, UA NONE /HPF   POCT Glucose   Result Value Ref Range    Meter Glucose 99 74 - 99 mg/dL   POCT Venous   Result Value Ref Range    POC Sodium 140 132 - 146 mmol/L    POC Potassium 4.6 3.5 - 5.0 mmol/L    POC Chloride 104 100 - 108 mmol/L    POC Glucose 136 (H) 74 - 99 mg/dl    POC Creatinine 1.5 (H) 0.7 - 1.2 mg/dL    GFR Non-African American 44 >=60 mL/min/1.73    GFR  54     Performed on SEE BELOW    EKG 12 Lead   Result Value Ref Range    Ventricular Rate 72 BPM    Atrial Rate 72 BPM    QRS Duration 98 ms    Q-T Interval 346 ms    QTc Calculation (Bazett) 378 ms    R Axis -28 degrees    T Axis 95 degrees       Radiology:  CT Head WO Contrast   Final Result      No acute intracranial hemorrhage or edema. If clinical concern exists   for acute stroke, MRI brain with diffusion-weighted imaging could be   helpful for further evaluation. XR CHEST PORTABLE   Final Result   Signs of malignancy is seen in the right para hilar   region, see report of the PET CT study of February 16, 2019.       No superimposed acute cardiopulmonary process. ------------------------- NURSING NOTES AND VITALS REVIEWED ---------------------------  Date / Time Roomed:  6/8/2019  9:37 PM  ED Bed Assignment:  12/12    The nursing notes within the ED encounter and vital signs as below have been reviewed. BP (!) 161/79   Pulse 75   Temp 97.5 °F (36.4 °C) (Oral)   Resp 18   Ht 5' 8\" (1.727 m)   Wt 133 lb (60.3 kg)   SpO2 96%   BMI 20.22 kg/m²   Oxygen Saturation Interpretation: Normal      ------------------------------------------ PROGRESS NOTES ------------------------------------------  I have spoken with the patient and discussed todays results, in addition to providing specific details for the plan of care and counseling regarding the diagnosis and prognosis. Their questions are answered at this time and they are agreeable with the plan. I discussed at length with them reasons for immediate return here for re evaluation. They will followup with their primary care physician by calling their office tomorrow. --------------------------------- ADDITIONAL PROVIDER NOTES ---------------------------------  At this time the patient is without objective evidence of an acute process requiring hospitalization or inpatient management. They have remained hemodynamically stable throughout their entire ED visit and are stable for discharge with outpatient follow-up. The plan has been discussed in detail and they are aware of the specific conditions for emergent return, as well as the importance of follow-up. New Prescriptions    No medications on file       Diagnosis:  1. Ambulatory dysfunction    2. Dehydration    3. Parkinson's disease ([facility]        Disposition:  Patient's disposition: Discharge to home  Patient's condition is stable.          Aissatou Barrow DO  Resident  06/09/19 [id number]

## 2019-06-10 NOTE — TELEPHONE ENCOUNTER
Met with patient and daughter during his follow up with Virginie Blank NP for his recent lung cancer diagnosis. Reviewed with patient symptom management and resources available. Patient was friendly and receptive. States he is doing ok. Denies any needs at this time. Will see Dr Margarita Mehta tomorrow to potentially start Keytruda infusions. Encouraged patient to call with questions or concerns between office visits. Verbalizes understanding. Patient appreciative of visit. NN Will continue to follow.

## 2019-06-10 NOTE — PROGRESS NOTES
RADIATION ONCOLOGY  6 week follow up         6/11/2019      NAME:  Percy White. YOB: 1933    CC: Pt returns today for re-assessment of radiation treatment area. Diagnosis:  Lung cancer right lung. · Radiation therapy to right lung, received 6000 cGy/ 30 fx completed 11/08/2017. Subjective: On 05/03/2019, Percy White. completed re-irradiation to RUL lesion, received 5000 cGy in 10 fractions SBRT. Patient seen today in 4 week post radiation symptom management visit. Patient's daughter Tanda Krabbe accompanies him at today's visit. Tanda Krabbe reports patient with nonproductive cough, patient denies shortness of breath at rest. Patient predominantly sedentary, sitting in recliner throughout most of day. Patient does ambulate to/ from bathroom at home with walker assistance. Patient denies dyspnea with this exertion. Patient denies chest pain or pleuritic pain. Patient compliant with CPAP therapy at , no supplemental oxygen used. Patient denies fevers, chills, nausea or diarrhea. Reports constipation now under control started on Miralex per Med-Onc. Kyra Hewittjessicarojelio reports patient taken to ED for weakness unable to bear weight. Per review or EMR patient had garbled speech en route to hospital per EMS staff reports. CT head completed negative for acute intracranial hemorrhage or edema. Portable CXR and CT chest completed. CT chest revealed persistent but improving malignancy with scarring and atelectasis (see findings/ impression below; official report under imaging tablet). Patient received IV fluids hydration, later in early morning hours patient discharged to home from ER. Tanda Krabbe reports ongoing difficult having patient consume more fluids throughout day. She reports both her and her sister (who are both at work) call patient to remind him throughout day to drink more.  Tanda Krabbe also reports they are in process of hiring a daytime caretaker to come to house while they are at work. Patient does drink Boost high protein nutritional supplement shake a few times per week (not daily). Patient is following with Dr. Cait Phillips, Medical Oncology. Appointment scheduled for tomorrow. Pain: No pain complaints. Past medical, surgical, social and family histories reviewed and updated as indicated. ALLERGIES:  Fexofenadine-pseudoephed er         Current Outpatient Medications   Medication Sig Dispense Refill    Amantadine (SYMMETREL) 100 MG TABS tablet TAKE 1 TABLET BY MOUTH TWICE DAILY  5    clopidogrel (PLAVIX) 75 MG tablet Take 75 mg by mouth daily      mirtazapine (REMERON) 7.5 MG tablet Take 7.5 mg by mouth nightly      gabapentin (NEURONTIN) 100 MG capsule Take 300 mg by mouth nightly. Ardyth Farzana acetaminophen (TYLENOL) 325 MG tablet Take 650 mg by mouth every 6 hours as needed for Pain      Memantine HCl (NAMENDA PO) Take 5 mg by mouth 2 times daily       Donepezil HCl (ARICEPT PO) Take by mouth      propranolol (INDERAL) 20 MG tablet Take 20 mg by mouth 2 times daily      CARBIDOPA PO Take  mg by mouth three times daily       hyoscyamine (LEVBID) 0.375 MG CR tablet Take 0.375 mg by mouth every 12 hours       Vitamin D (CHOLECALCIFEROL) 1000 UNITS CAPS capsule Take 2,000 Units by mouth daily       digoxin (LANOXIN) 0.25 MG tablet Take 250 mcg by mouth daily.  pravastatin (PRAVACHOL) 40 MG tablet Take 40 mg by mouth daily.  aspirin 81 MG EC tablet Take 1 tablet by mouth daily. 30 tablet 0    doxazosin (CARDURA) 1 MG tablet   Take 2 mg by mouth daily       lisinopril (PRINIVIL;ZESTRIL) 20 MG tablet Take 10 mg by mouth daily       Multiple Vitamins-Minerals (TREVOR MULTIVITAMIN FOR MEN PO) Take  by mouth. No current facility-administered medications for this encounter. Imaging:    CT Chest WO Contrast, 06/08/2019 (ordered by Tevin Lin CNP at [facility]):  Findings:  Lack of contrast somewhat limits the study.  The heart and the great   vessels are unremarkable. There is mild ectasia of the ascending   thoracic aorta measuring 3.9 cm. There is severe coronary artery   calcifications. The trachea and major bronchi are patent. The   previously noted infiltrative density in the right hilum with the   strandy densities extending to right upper lobe are noted with the   nodular component in the right hilum currently measuring 2.3 x 1.7 cm   with improvement. There is minimal scarring and atelectasis in the   right middle lobe. The remainder of the lungs are clear. Grossly, the   liver is of normal architecture. The gallbladder is distended. 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 Punctate   calcifications are noted in the pancreas concerning for chronic   calcified pancreatitis. There is severe degenerative changes in the   thoracic spine with  mild kyphosis. Impression:   Persistent infiltrative nodular density in the right perihilar region   with adjacent strandy densities in the right upper lobe and right   middle lobe with improvement concerning for persistent but improving   malignancy with scarring and atelectasis. Continued surveillance is   recommended. Portable CXR, 06/08/2019 (ordered per ER physician Dr. La Nichols): Impression:  Signs of malignancy is seen in the right para hilar region, see report of the PET CT study of February 16, 2019. No superimposed acute cardiopulmonary process. CT Head W/o Contrast,06/08/2019 (ordered per ER physician Dr. La Nichols):  Findings:  No evidence of acute intracranial hemorrhage or edema. Confluent areas   of hypoattenuation are present in periventricular and subcortical   white matter suggestive of chronic microvascular ischemic change. There is prominence of sulci, cisterns, and ventricles related to   parenchymal volume loss which is likely age-appropriate.  Mastoid air   cells are clear.      Impression:  No acute intracranial hemorrhage or edema. If clinical concern exists for acute stroke, MRI brain with diffusion-weighted imaging could be helpful for further evaluation. Physical Examination:   Vitals:    06/10/19 1404   BP: 104/60   Pulse: 70   Resp: 18   Temp: 98.4 °F (36.9 °C)       Wt Readings from Last 3 Encounters:   06/10/19 131 lb 5 oz (59.6 kg)   06/08/19 133 lb (60.3 kg)   05/01/19 133 lb 4.8 oz (60.5 kg)        Body mass index is 19.97 kg/m². Alert and fully ambulatory. Pleasant and conversant. Irradiated skin no erythema. ASSESSMENT/PLAN:     Re-irradiation SBRT to RUL lesion completed 05/03/2019. CT chest W + W/O contrast ideally 3 month post radiation completion (complete August 4th or later). Await Medical Oncology recommendation prior to ordering CT chest (patient planned to start Keytruda, review Med-Onc plans for timing of imaging). Skin care discussed. Recommend applying a gentle moisturizing lotion daily and as needed. I discussed follow up plans with Devorah Brantley .  At this time, Dr. Aiden Ramsey will see the patient back in 3 months for a post-radiation completion follow-up visit. Devorah Perez. is to follow up with other physicians/ APPs involved in their care as directed (including but not limited to Medical Oncology, Primary Care, Pulmonary, and Surgery). The patient was given our contact number in the event that if at any time they change their mind and would like to return to the clinic to see either myself or one of the Radiation Oncologists, they can simply call us and we would be happy to see them sooner. Thank you for involving us in the management of this extremely pleasant patient. More than 15 min was in direct contact with pt coordinating/giving care. >50% of the visit was spent in counseling the pt on the following: Follow up care    Questions answered to apparent satisfaction.     Milla Montana, MSN, APRN-CNP  Certified Nurse Practitioner for [facility]   Phone: [phone]/ Fax: [phone]

## 2019-06-23 PROBLEM — R50.9 FEVER: Status: ACTIVE | Noted: 2019-01-01

## 2019-06-23 NOTE — ED NOTES
Bed: 19  Expected date:   Expected time:   Means of arrival:   Comments:  [address], [address], RN  06/23/19 9630

## 2019-06-24 PROBLEM — F03.90 DEMENTIA (HCC): Status: ACTIVE | Noted: 2019-01-01

## 2019-06-24 PROBLEM — N39.0 UTI (URINARY TRACT INFECTION): Status: ACTIVE | Noted: 2019-01-01

## 2019-06-24 NOTE — H&P
[facility]  History and Physical      CHIEF COMPLAINT: Fever    Reason for Admission: Possible urinary tract infection    History Obtained From: Electronic medical record    PCP :  MD Sean Kramer, [address] / [facility] [address]      HISTORY OF PRESENT ILLNESS:      The patient is a 80 y.o. male presented to the emergency room because of low blood pressure. He also was noted to have a fever in the emergency room. He was felt to have urinary tract infection. CT scan of the abdomen pelvis was abnormal as well. At the time of examination patient is noted to be a poor historian. Not much history could be obtained from him. Daughter reports he has been having urinary issues and has seen dr Gracy Garrison last weeks. He also finished SBRT with dr Buzz Bazzi recently for lung cancer. Sees dr Esthela Recio as well for pulmonary. He also had multiple attempts at mccoy placement recently. Past Medical History:        Diagnosis Date    Arthritis     Atherosclerosis of native arteries of right leg with ulceration of other part of foot ([address] 3/28/2018    Cancer (HCC)     lung    Carotid artery stenosis     Dementia     HIGH CHOLESTEROL     treated with medication    Hypertension     Parkinson disease ([address]     Peripheral vascular disease ([address]      Past Surgical History:        Procedure Laterality Date    BRONCHOSCOPY      CARDIAC CATHETERIZATION  04/04/2018    Left transfemoral right superficial femoral artery catheterization, right lower extremity runoff  Alyse Abad MD   [address]      triple bypass    COLONOSCOPY  2011    Dr. Arvin Denver at [facility]    HIP FRACTURE SURGERY Right 09/06/2011    Right hip hemiarthroplasty RAJAT Yao MD         Medications Prior to Admission:    Medications Prior to Admission: aspirin 81 MG EC tablet, Take 81 mg by mouth daily  Mirabegron ER 25 MG TB24, Take 25 mg by mouth three times a week Takes M, W, F at night  Dextromethorphan-guaiFENesin (ROBITUSSIN DM PO), Take 5 mLs by mouth every 4 hours as needed   clopidogrel (PLAVIX) 75 MG tablet, Take 75 mg by mouth daily  mirtazapine (REMERON) 7.5 MG tablet, Take 7.5 mg by mouth nightly  gabapentin (NEURONTIN) 100 MG capsule, Take 100 mg by mouth 2 times daily. Indications: will slowly increase to 300mg   Memantine HCl (NAMENDA PO), Take 5 mg by mouth 2 times daily   Donepezil HCl (ARICEPT PO), Take 10 mg by mouth daily   propranolol (INDERAL) 20 MG tablet, Take 20 mg by mouth 2 times daily  CARBIDOPA PO, Take  mg by mouth three times daily   Vitamin D (CHOLECALCIFEROL) 1000 UNITS CAPS capsule, Take 2,000 Units by mouth daily   digoxin (LANOXIN) 0.25 MG tablet, Take 250 mcg by mouth daily. pravastatin (PRAVACHOL) 40 MG tablet, Take 40 mg by mouth daily. doxazosin (CARDURA) 1 MG tablet,  Take 2 mg by mouth daily   lisinopril (PRINIVIL;ZESTRIL) 20 MG tablet, Take 10 mg by mouth daily   Multiple Vitamins-Minerals (TREVOR MULTIVITAMIN FOR MEN PO), Take  by mouth. Allergies:  Fexofenadine-pseudoephed er    Social History:   Social History     Socioeconomic History    Marital status:       Spouse name: Not on file    Number of children: Not on file    Years of education: Not on file    Highest education level: Not on file   Occupational History    Not on file   Social Needs    Financial resource strain: Not on file    Food insecurity:     Worry: Not on file     Inability: Not on file    Transportation needs:     Medical: Not on file     Non-medical: Not on file   Tobacco Use    Smoking status: Former Smoker     Packs/day: 1.25     Types: Cigarettes     Last attempt to quit: 1985     Years since quittin.8    Smokeless tobacco: Never Used   Substance and Sexual Activity    Alcohol use: No     Comment: remote Hx of moderate alcohol use, quit     Drug use: No    Sexual activity: Not Currently   Lifestyle    Physical

## 2019-06-24 NOTE — PROGRESS NOTES
Call light was answered by NICOLE Lombardi. Patient daughter stated that patient was in between bed rails. Patient was not in between bed rail. Patient had positioned himself in the middle of bed. RN and [address] [place] [address] repositioned patient.

## 2019-06-24 NOTE — PROGRESS NOTES
Patient has positive blood cultures that resulted this morning. Dr. Clair Mcdonough was perfect served regarding this matter.

## 2019-06-25 NOTE — PLAN OF CARE
Problem: Falls - Risk of:  Goal: Will remain free from falls  Description  Will remain free from falls  6/25/2019 1018 by Chivo Dover RN  Outcome: Met This Shift  6/25/2019 0159 by Desire Sams RN  Outcome: Met This Shift  Goal: Absence of physical injury  Description  Absence of physical injury  Outcome: Met This Shift     Problem: Risk for Impaired Skin Integrity  Goal: Tissue integrity - skin and mucous membranes  Description  Structural intactness and normal physiological function of skin and  mucous membranes.   Outcome: Met This Shift

## 2019-06-25 NOTE — PROGRESS NOTES
sterile water 20 mL IV syringe, 2 g, Intravenous, Q24H, Cricket Arreguin MD, 2 g at 06/25/19 1302    enoxaparin (LOVENOX) injection 30 mg, 30 mg, Subcutaneous, Daily, Rosales Saucedo DO, 30 mg at 06/25/19 8429    hydrALAZINE (APRESOLINE) injection 10 mg, 10 mg, Intravenous, Q6H PRN, Petrona Ingram MD, 10 mg at 06/24/19 2309    sodium chloride flush 0.9 % injection 10 mL, 10 mL, Intravenous, 2 times per day, Karma Sera, DO    sodium chloride flush 0.9 % injection 10 mL, 10 mL, Intravenous, PRN, Karma Sera, DO, 10 mL at 06/23/19 1841    acetaminophen (TYLENOL) tablet 650 mg, 650 mg, Oral, Q4H PRN, Karma Sera, DO    aspirin EC tablet 81 mg, 81 mg, Oral, Daily, Karma Sera, DO, 81 mg at 06/23/19 1848    carbidopa-levodopa (SINEMET)  MG per tablet 1 tablet, 1 tablet, Oral, TID, Karma Sera, DO    clopidogrel (PLAVIX) tablet 75 mg, 75 mg, Oral, Daily, Karma Sera, DO, 75 mg at 06/23/19 1849    digoxin (LANOXIN) tablet 250 mcg, 250 mcg, Oral, Daily, Karma Sera, DO, 250 mcg at 06/23/19 1848    donepezil (ARICEPT) tablet 10 mg, 10 mg, Oral, Daily, Karma Sera, DO, 10 mg at 06/23/19 1848    doxazosin (CARDURA) tablet 2 mg, 2 mg, Oral, Daily, Karma Sera, DO, 2 mg at 06/23/19 1849    gabapentin (NEURONTIN) capsule 100 mg, 100 mg, Oral, BID, Karma Sera, DO    lisinopril (PRINIVIL;ZESTRIL) tablet 10 mg, 10 mg, Oral, Daily, Karma Sera, DO, 10 mg at 06/23/19 1849    memantine (NAMENDA) tablet 5 mg, 5 mg, Oral, BID, Karma Sera, DO    mirtazapine (REMERON) tablet 7.5 mg, 7.5 mg, Oral, Nightly, Karma Sera, DO    therapeutic multivitamin-minerals 1 tablet, 1 tablet, Oral, Daily, Myah Sera, DO, 1 tablet at 06/23/19 1848    pravastatin (PRAVACHOL) tablet 40 mg, 40 mg, Oral, Daily, Myah Sera, DO, 40 mg at 06/23/19 1848    propranolol (INDERAL) tablet 20 mg, 20 mg, Oral, BID, Myah Sera, DO    vitamin D tablet 2,000 Units, 2,000 Units, Oral, Daily,

## 2019-06-26 NOTE — PROGRESS NOTES
Subjective:    Chief complaint:    Awake, confused  Very active at the time of my visit trying to pull on his IV    Objective:    BP (!) 186/78   Pulse 98   Temp 97.8 °F (36.6 °C) (Temporal)   Resp 22   Ht 5' 8\" (1.727 m)   Wt 131 lb (59.4 kg)   SpO2 100%   BMI 19.92 kg/m²   General : Awake, confused, somewhat agitated at my visit   heart:  RRR, no murmurs, gallops, or rubs. Lungs:  CTA bilaterally, no wheeze, rales or rhonchi  Abd: bowel sounds present, nontender, nondistended, no masses  Extrem:  No clubbing, cyanosis, or edema    CBC:   Lab Results   Component Value Date    WBC 10.8 06/26/2019    RBC 3.85 06/26/2019    HGB 12.6 06/26/2019    HCT 38.7 06/26/2019    .5 06/26/2019    MCH 32.7 06/26/2019    MCHC 32.6 06/26/2019    RDW 14.7 06/26/2019     06/26/2019    MPV 10.8 06/26/2019     BMP:    Lab Results   Component Value Date     06/26/2019    K 4.4 06/26/2019    K 4.3 06/23/2019     06/26/2019    CO2 16 06/26/2019    BUN 62 06/26/2019    LABALBU 3.8 06/23/2019    LABALBU 4.1 02/15/2012    CREATININE 1.9 06/26/2019    CALCIUM 8.6 06/26/2019    GFRAA 41 06/26/2019    LABGLOM 34 06/26/2019    GLUCOSE 185 06/26/2019    GLUCOSE 117 02/15/2012     PT/INR:    Lab Results   Component Value Date    PROTIME 12.0 02/11/2017    PROTIME 19.6 10/10/2011    INR 1.1 02/11/2017     Troponin:    Lab Results   Component Value Date    TROPONINI <0.01 06/08/2019       No results for input(s): LABURIN in the last 72 hours.   Recent Labs     06/24/19  1316   BC 24 Hours- no growth     Recent Labs     06/24/19  1324   BLOODCULT2 24 Hours- no growth         Current Facility-Administered Medications:     cefTRIAXone (ROCEPHIN) 2 g in sterile water 20 mL IV syringe, 2 g, Intravenous, Q24H, Guera Jacome MD, 2 g at 06/26/19 1254    haloperidol lactate (HALDOL) injection 2 mg, 2 mg, Intramuscular, Q6H PRN, Donny Childress MD    enoxaparin (LOVENOX) injection 30 mg, 30 mg, Subcutaneous, Daily, Any Leaf, DO, 30 mg at 06/25/19 6178    hydrALAZINE (APRESOLINE) injection 10 mg, 10 mg, Intravenous, Q6H PRN, Brian Cortez MD, 10 mg at 06/24/19 2309    sodium chloride flush 0.9 % injection 10 mL, 10 mL, Intravenous, 2 times per day, William Castañeda DO, 10 mL at 06/25/19 2032    sodium chloride flush 0.9 % injection 10 mL, 10 mL, Intravenous, PRN, William Castañeda DO, 10 mL at 06/23/19 1841    acetaminophen (TYLENOL) tablet 650 mg, 650 mg, Oral, Q4H PRN, William Castañeda DO    aspirin EC tablet 81 mg, 81 mg, Oral, Daily, William Castañeda DO, 81 mg at 06/23/19 1848    carbidopa-levodopa (SINEMET)  MG per tablet 1 tablet, 1 tablet, Oral, TID, William Castañeda DO    clopidogrel (PLAVIX) tablet 75 mg, 75 mg, Oral, Daily, William Castañeda DO, 75 mg at 06/23/19 1849    digoxin (LANOXIN) tablet 250 mcg, 250 mcg, Oral, Daily, William Castañeda DO, 250 mcg at 06/23/19 1848    donepezil (ARICEPT) tablet 10 mg, 10 mg, Oral, Daily, William Castañeda DO, 10 mg at 06/23/19 1848    doxazosin (CARDURA) tablet 2 mg, 2 mg, Oral, Daily, William Castañeda DO, 2 mg at 06/23/19 1849    gabapentin (NEURONTIN) capsule 100 mg, 100 mg, Oral, BID, William Castañeda DO    lisinopril (PRINIVIL;ZESTRIL) tablet 10 mg, 10 mg, Oral, Daily, William Castañeda DO, 10 mg at 06/23/19 1849    memantine (NAMENDA) tablet 5 mg, 5 mg, Oral, BID, William Castañeda DO    mirtazapine (REMERON) tablet 7.5 mg, 7.5 mg, Oral, Nightly, William Castañeda DO    therapeutic multivitamin-minerals 1 tablet, 1 tablet, Oral, Daily, William Castañeda DO, 1 tablet at 06/23/19 1848    pravastatin (PRAVACHOL) tablet 40 mg, 40 mg, Oral, Daily, William Castañeda DO, 40 mg at 06/23/19 1848    propranolol (INDERAL) tablet 20 mg, 20 mg, Oral, BID, William Castañeda DO    vitamin D tablet 2,000 Units, 2,000 Units, Oral, Daily, William Castañeda DO, 2,000 Units at 06/23/19 1848    0.9 % sodium chloride infusion, , Intravenous, Continuous, William Castañeda DO, Last Rate: 75

## 2019-06-26 NOTE — PROGRESS NOTES
Messaged Dr. Brittany Tracey because pt said he was having pain and is NPO. Pt does not have anything for pain via IV. No new orders at this time.

## 2019-06-26 NOTE — PROGRESS NOTES
been reviewed prior to initiation of this evaluation. ADMITTING DIAGNOSIS: Fever [R50.9]  Fever [R50.9]     ACTIVE PROBLEM LIST:   Patient Active Problem List   Diagnosis    Fracture of hip, right, closed [facility]    HTN (hypertension)    HLD (hyperlipidemia)    PVD (peripheral vascular disease) ([facility]    CAD (coronary artery disease) of artery bypass graft    Ileus (HCC)    Hypokalemia    AF (atrial fibrillation) ([facility]    Carotid artery stenosis    PVD [facility]peripheral vascular disease) [facility] claudication ([facility]    General weakness    Lung cancer ([facility]    Atherosclerosis of native arteries of left leg with ulceration of other part of foot ([facility]    Atherosclerosis of native arteries of right leg with ulceration of other part of foot ([facility]    Fever    Dementia    UTI (urinary tract infection)     Formerly [facility]  Speech Language Pathologist Student Intern    Attestation: I have reviewed the above documentation and am in agreement with its content.     Yadi Soares MSCCC/SLP  Speech Language Pathologist  [id number]

## 2019-06-26 NOTE — PROGRESS NOTES
Pt kept moving head and grabbing at nurses and NG tube. Family decided not to have NG placed at this time.

## 2019-06-26 NOTE — PLAN OF CARE
Problem: Falls - Risk of:  Goal: Will remain free from falls  Description  Will remain free from falls  6/26/2019 0312 by Deysi Guo RN  Outcome: Met This Shift  6/25/2019 2031 by Cassandra Brown RN  Outcome: Met This Shift     Problem: Falls - Risk of:  Goal: Absence of physical injury  Description  Absence of physical injury  Outcome: Met This Shift

## 2019-06-27 NOTE — PROGRESS NOTES
Physical Therapy  [facility] MED SURG ON[facility]  Physical Therapy Initial Evaluation  Name: Wili Armas. : 1933  MRN: [id number]    Date of Service: 2019    Evaluating Therapist: Ricardo Hernandez PT, DPT  [id number]    Room #: 2230/3080-L  DIAGNOSIS: Fever  PRECAUTIONS: Falls, O2, bed alarm, Parkinsons, heel protectors, NPO  PMH: Arthritis, lung CA, dementia, high cholesterol, PD, PVD    Social: Pt unable to answer questions. Pt's daughter gave social history. Pt lives with daughter in a 2 story home with 4 stairs and B wide rail/s to enter. Pt has bedroom and bedside commode on first floor There are 13 steps and 1 rail/s to second floor full bath. Prior to admission pt ambulated with rollator. Pt goes up stairs sideways. Initial Evaluation  Date: 19 Treatment  Short Term/ Long Term   Goals   Was pt agreeable to Eval/treatment? Yes     Does pt have pain? No c/o pain     Bed Mobility  Rolling: NT  Supine to sit: NT  Sit to supine: NT  Scooting: NT  Min A   Transfers Sit to stand: Max A  Stand to sit: Max A   Stand pivot: Dep  Min A   Ambulation   NT  50 feet using [employer] with Min A   Stair negotiation:  NT  4 steps using 1 rail with Min A   ROM RLE: WFL  LLE: WFL     Strength BLE: grossly 3/5 via observtaional assessment d/t inability to follow commands for MMT     Balance   Sitting: Min A  Standing: Max A     AM-PAC Basic Mobility Inpatient Short Form Raw Score:         Patient is A&Ox1 (pt responded to name). Sensation: NT  Coordination: NT  Edema: None noted     ASSESSMENT  Pt displays functional ability as noted in the objective portion of this evaluation. Comments/Treatment:  Pt was cleared by RN prior to PT evaluation. Pt was received supine in bed and was agreeable to PT evaluation. Therapist tested pt's orientation and performed ROM screening in supine. Physician entered room and spoke with pt's daughter. Second physician entered room  to speak with pt's daughter and therapist left.

## 2019-06-27 NOTE — PROGRESS NOTES
OCCUPATIONAL THERAPY INITIAL EVALUATION      Date:2019  Patient Name: Nolberto Esposito. MRN: [id number]  : 1933  Room: [address] [place] [address]-A      Evaluating [address] [place] [address] [id number]    AM-PAC Daily Activity Raw Score:   Recommended Adaptive Equipment: TBD     Diagnosis: Fever [R50.9]  Fever [R50.9]     Pt presented to ED on 19 for hypotension and fever  Pertinent Medical History: Afib, arthritis, lung CA, carotid artery stenosis, dementia, HTN, Parkinson disease, PVD    Precautions:  Falls, TSM, bed alarm, heel protectors, Parkinson's, dementia, incontinence     Home Living: Pt lives with 2 daughters. Equipment owned: ww, w/c, CPAP  Above per chart    Prior Level of Function: Unable to obtain from pt. Family/caregiver not present    Pain Level: No c/o or observed pain this session     Cognition: A&O: 1/4 (self only); Follows 1 step directions ~10% of session. Verbal and/or visual cues provided for initiation and sequencing functional tasks  Pt nonverbal for majority of session, consistently moans/groans at rest and w/ activity. Unable to indicate if d/t pain. Memory:  poor   Sequencing:  poor   Problem solving:  poor   Judgement/safety:  poor     Functional Assessment:   Initial Eval Status  Date: 19 Treatment Status  Date: Short Term Goals  Treatment frequency: PRN    Feeding NPO   -   Grooming Moderate Assist   Washed hands and face 2x  Stand by Assist    UB Dressing Maximal Assist   gown  Moderate Assist    LB Dressing Dependent   Maximal Assist    Bathing Dependent  Maximal Assist    Toileting Dependent   Incontinent of urine and stool  Maximal Assist    Bed Mobility  Rolling: Moderate Assist   W/ cues and increased time  Supine to sit: NT  Attempted supine>sit - pt initially agreeable. Once LE's assist to EOB, pt became resistant - verbal encouragement/education provided. Also noted increased trunk rigidity.   Rolling: Minimal Assist   Supine to sit: Moderate Assist   Sit to supine:

## 2019-06-28 PROBLEM — E43 SEVERE PROTEIN-CALORIE MALNUTRITION (HCC): Status: ACTIVE | Noted: 2019-01-01

## 2019-06-28 NOTE — PROGRESS NOTES
Went to assist patient with home cpap but patient was asleep. Woke him up and asked if he would like to go on, he said no and went back to sleep.

## 2019-06-28 NOTE — ANESTHESIA POSTPROCEDURE EVALUATION
Department of Anesthesiology  Postprocedure Note    Patient: Ce Salinas MRN: [id number]  YOB: 1933  Date of evaluation: 6/28/2019  Time:  5:53 PM     Procedure Summary     Date:  06/28/19 Room / Location:  [facility] 03 / [facility] ENDOSCOPY    Anesthesia Start:  1425 Anesthesia Stop:  3505    Procedure:  PEG TUBE INSERTION (N/A ) Diagnosis:  (.)    Surgeon:  Binh Kenny MD Responsible Provider:  Filiberto Sablilon DO    Anesthesia Type:  MAC ASA Status:  3          Anesthesia Type: MAC    Consuelo Phase I: Consuelo Score: 10    Consuelo Phase II:      Last vitals: Reviewed and per EMR flowsheets.        Anesthesia Post Evaluation    Patient location during evaluation: PACU  Patient participation: complete - patient participated  Level of consciousness: awake and alert  Airway patency: patent  Nausea & Vomiting: no nausea and no vomiting  Complications: no  Cardiovascular status: hemodynamically stable and blood pressure returned to baseline  Respiratory status: acceptable  Hydration status: euvolemic

## 2019-06-28 NOTE — PROGRESS NOTES
multivitamin-minerals 1 tablet, 1 tablet, Oral, Daily, Ulanda Dolan, DO, 1 tablet at 06/23/19 1848    pravastatin (PRAVACHOL) tablet 40 mg, 40 mg, Oral, Daily, Ulanda Dolan, DO, 40 mg at 06/23/19 1848    propranolol (INDERAL) tablet 20 mg, 20 mg, Oral, BID, Ulanda Dolan, DO    vitamin D tablet 2,000 Units, 2,000 Units, Oral, Daily, Ulanda Dolan, DO, 2,000 Units at 06/23/19 2664    Diet NPO Effective Now    FL MODIFIED BARIUM SWALLOW W VIDEO   Final Result   Study is remarkable for laryngeal penetration and   aspiration with thin consistency. This procedure was performed by Lan Johnson PA-C under the   indirect supervision of Maritza Zendejas MD.      The exam has been dictated and signed by Lan Johnson PA-C. Alyse Rosas MD, Radiologist, have reviewed the images and   report and concur with these findings. Fluoroscopy modified barium swallow with video   Final Result   Study was remarkable for both oral and pharyngeal delay. There was evidence of retention within the vallecula. Patient   demonstrated persistent cough throughout the test but there was no   evidence of aspiration or laryngeal penetration seen in relation to   coughing. This procedure was performed by Deena Vasquez. Katelyn Guo CNP under the   indirect supervision of  Maritza Zendejas MD.      The exam has been dictated and signed by Deena Vasquez. Katelyn Guo CNP. Alyse Rosas MD, Radiologist, have reviewed the images and   report and concur with these findings. XR CHEST PORTABLE   Final Result      Redemonstration of opacities overlying right upper lobe and medial   left lower lobe which could suggest persistent pneumonia or areas of   atelectasis appearing similar since previous examination. Continued   follow-up could be helpful for further evaluation.       CT ABDOMEN PELVIS WO CONTRAST Additional Contrast? None   Final Result   Large amount retained fecal matter concerning for constipation. Thickened urinary bladder concerning for cystitis. Distended gallbladder with the persistent common bile duct dilatation. CT CHEST WO CONTRAST   Final Result   Persistent nodular soft tissue density in the right perihilar region   with the strandy and patchy densities extending to the right upper   lobe and right middle lobe which may represent residual malignancy   with scarring and atelectasis. There may be some superimposed   pneumonic infiltrates in the right upper lobe. XR CHEST PORTABLE   Final Result   Persistent slightly progressive patchy and nodular infiltrative   density in the right suprahilar region and right upper lobe which may   represent persistent malignancy with  progressive superimposed   pneumonic infiltrates. Assessment:    Active Problems:    HTN (hypertension)    HLD (hyperlipidemia)    PVD (peripheral vascular disease) (HCC)    PVD (peripheral vascular disease) with claudication (HCC)    Fever    Dementia    UTI (urinary tract infection)    Severe protein-calorie malnutrition (HCC)  Resolved Problems:    * No resolved hospital problems. *  Gram-negative karolina sepsis  Parkinson's disease  Dysphagia  Metabolic encephalopathy    Plan:    Await peg   HAILE there after    Murtaza Louis  3:17 PM  6/28/2019    NOTE: This report was transcribed using voice recognition software.  Every effort was made to ensure accuracy; however, inadvertent transcription errors may be present

## 2019-06-28 NOTE — PLAN OF CARE
Problem: Malnutrition  (NI-5.2)  Goal: Food and/or Nutrient Delivery  Description: Initiate TF after PEG placement, goal of Standard w/ Fiber @ 60 ml/hr  Individualized approach for food/nutrient provision.   Outcome: Met This Shift

## 2019-06-28 NOTE — PLAN OF CARE
Problem: Falls - Risk of:  Goal: Will remain free from falls  Description  Will remain free from falls  6/28/2019 1040 by Luis A Diane RN  Outcome: Met This Shift  6/28/2019 0048 by Cherelle Dickey RN  Outcome: Met This Shift  Goal: Absence of physical injury  Description  Absence of physical injury  6/28/2019 1040 by Luis A Diane RN  Outcome: Met This Shift  6/28/2019 0048 by Cherelle Dickey RN  Outcome: Met This Shift     Problem: Risk for Impaired Skin Integrity  Goal: Tissue integrity - skin and mucous membranes  Description  Structural intactness and normal physiological function of skin and  mucous membranes.   6/28/2019 1040 by Luis A Diane RN  Outcome: Met This Shift  6/28/2019 0048 by Cherelle Dickey RN  Outcome: Met This Shift     Problem: Safety:  Goal: Ability to chew and swallow food without choking will improve  Description  Ability to chew and swallow food without choking will improve  Outcome: Met This Shift  Goal: Signs and symptoms of aspiration will decrease  Description  Signs and symptoms of aspiration will decrease  Outcome: Met This Shift     Problem: Pain:  Goal: Pain level will decrease  Description  Pain level will decrease  Outcome: Met This Shift  Goal: Control of acute pain  Description  Control of acute pain  6/28/2019 1040 by Luis A Diane RN  Outcome: Met This Shift  6/28/2019 0048 by Cherelle Dickey RN  Outcome: Met This Shift  Goal: Control of chronic pain  Description  Control of chronic pain  Outcome: Met This Shift

## 2019-06-28 NOTE — ANESTHESIA PRE PROCEDURE
Department of Anesthesiology  Preprocedure Note       Name:  Percy White. Age:  80 y.o.  :  1933                                          MRN:  [id number]         Date:  2019      Surgeon: Jovan Maki):  Ag French MD    Procedure: PEG TUBE INSERTION (N/A )    Medications prior to admission:   Prior to Admission medications    Medication Sig Start Date End Date Taking? Authorizing Provider   aspirin 81 MG EC tablet Take 81 mg by mouth daily   Yes Historical Provider, MD   Mirabegron ER 25 MG TB24 Take 25 mg by mouth three times a week Takes M, W, F at night   Yes Historical Provider, MD   Dextromethorphan-guaiFENesin (ROBITUSSIN DM PO) Take 5 mLs by mouth every 4 hours as needed    Yes Historical Provider, MD   clopidogrel (PLAVIX) 75 MG tablet Take 75 mg by mouth daily    Historical Provider, MD   mirtazapine (REMERON) 7.5 MG tablet Take 30 mg by mouth nightly     Historical Provider, MD   gabapentin (NEURONTIN) 100 MG capsule Take 100 mg by mouth 2 times daily. Indications: will slowly increase to 300mg     Historical Provider, MD   Memantine HCl (NAMENDA PO) Take 5 mg by mouth 2 times daily     Historical Provider, MD   Donepezil HCl (ARICEPT PO) Take 10 mg by mouth daily     Historical Provider, MD   propranolol (INDERAL) 20 MG tablet Take 20 mg by mouth 2 times daily    Historical Provider, MD   CARBIDOPA PO Take  mg by mouth three times daily     Historical Provider, MD   Vitamin D (CHOLECALCIFEROL) 1000 UNITS CAPS capsule Take 2,000 Units by mouth daily     Historical Provider, MD   digoxin (LANOXIN) 0.25 MG tablet Take 250 mcg by mouth daily. Historical Provider, MD   pravastatin (PRAVACHOL) 40 MG tablet Take 40 mg by mouth daily.     Historical Provider, MD   doxazosin (CARDURA) 1 MG tablet   Take 2 mg by mouth daily     Historical Provider, MD   lisinopril (PRINIVIL;ZESTRIL) 20 MG tablet Take 10 mg by mouth daily     Historical Provider, MD   Multiple Vitamins-Minerals (TREVOR MULTIVITAMIN FOR MEN PO) Take  by mouth.       Historical Provider, MD       Current medications:    Current Facility-Administered Medications   Medication Dose Route Frequency Provider Last Rate Last Dose    dextrose 5 % solution   Intravenous Continuous Reyes Trinh MD 75 mL/hr at 06/28/19 0943      lidocaine 1 % injection    PRN Cristian Chavarria MD   4.5 mL at 06/28/19 1437    LORazepam (ATIVAN) tablet 0.5 mg  0.5 mg Oral Q4H PRN Reyes Trinh MD        barium sulfate 60 % cream 45 g  45 g Oral ONCE PRN Erika Coley PA-C        cefTRIAXone (ROCEPHIN) 2 g in sterile water 20 mL IV syringe  2 g Intravenous Q24H Sandy Valencia MD   2 g at 06/28/19 1302    haloperidol lactate (HALDOL) injection 2 mg  2 mg Intramuscular Q6H PRN Reyes Trinh MD   2 mg at 06/27/19 2004    enoxaparin (LOVENOX) injection 30 mg  30 mg Subcutaneous Daily Rosales Saucedo, DO   30 mg at 06/27/19 0839    hydrALAZINE (APRESOLINE) injection 10 mg  10 mg Intravenous Q6H PRN Reyes Trinh MD   10 mg at 06/27/19 1831    sodium chloride flush 0.9 % injection 10 mL  10 mL Intravenous 2 times per day Pamalee Shield, DO   10 mL at 06/25/19 2032    sodium chloride flush 0.9 % injection 10 mL  10 mL Intravenous PRN Pamalee Shield, DO   10 mL at 06/23/19 1841    acetaminophen (TYLENOL) tablet 650 mg  650 mg Oral Q4H PRN Pamalee Shield, DO        aspirin EC tablet 81 mg  81 mg Oral Daily Pamalee Shield, DO   81 mg at 06/23/19 1848    carbidopa-levodopa (SINEMET)  MG per tablet 1 tablet  1 tablet Oral TID Pamalee Shield, DO        clopidogrel (PLAVIX) tablet 75 mg  75 mg Oral Daily Pamalee Shield, DO   75 mg at 06/23/19 1849    digoxin (LANOXIN) tablet 250 mcg  250 mcg Oral Daily Pamalee Shield, DO   250 mcg at 06/23/19 1848    donepezil (ARICEPT) tablet 10 mg  10 mg Oral Daily Pamalee Shield, DO   10 mg at 06/23/19 1848    doxazosin (CARDURA) tablet 2 mg  2 mg Oral Daily Kary Matta DO   2 mg at 06/23/19 1849    gabapentin (NEURONTIN) capsule 100 mg  100 mg Oral BID Gayla Mais, DO        lisinopril (PRINIVIL;ZESTRIL) tablet 10 mg  10 mg Oral Daily Gayla Mais, DO   10 mg at 06/23/19 1849    memantine (NAMENDA) tablet 5 mg  5 mg Oral BID Gayla Mais, DO   Stopped at 06/27/19 0900    mirtazapine (REMERON) tablet 7.5 mg  7.5 mg Oral Nightly Gayla Mais, DO        therapeutic multivitamin-minerals 1 tablet  1 tablet Oral Daily Gayla Mais, DO   1 tablet at 06/23/19 1848    pravastatin (PRAVACHOL) tablet 40 mg  40 mg Oral Daily Gayla Mais, DO   40 mg at 06/23/19 1848    propranolol (INDERAL) tablet 20 mg  20 mg Oral BID Gayla Mais, DO        vitamin D tablet 2,000 Units  2,000 Units Oral Daily Gayla Mais, DO   2,000 Units at 06/23/19 6876     Facility-Administered Medications Ordered in Other Encounters   Medication Dose Route Frequency Provider Last Rate Last Dose    esmolol (BREVIBLOC) injection    PRN Sherri Lara RN   20 mg at 06/28/19 1435    propofol injection    PRN Sherri Lara RN   20 mg at 06/28/19 1438    0.9 % sodium chloride infusion    Continuous PRN Sherri Lara RN           Allergies:     Allergies   Allergen Reactions    Fexofenadine-Pseudoephed Er        Problem List:    Patient Active Problem List   Diagnosis Code    Fracture of hip, right, closed ([facility] S72.001A    HTN (hypertension) I10    HLD (hyperlipidemia) E78.5    PVD (peripheral vascular disease) ([facility] I73.9    CAD (coronary artery disease) of artery bypass graft I25.810    Ileus ([facility]) K56.7    Hypokalemia E87.6    AF (atrial fibrillation) ([facility]) I48.91    Carotid artery stenosis I65.29    PVD (peripheral vascular disease) with claudication ([facility]) I73.9    General weakness R53.1    Lung cancer ([facility]) C34.90    Atherosclerosis of native arteries of left leg with ulceration of other part of foot ([facility] I70.245    Atherosclerosis of native arteries of right leg with ulceration of other part of foot ([facility] I70.235    Fever R50.9    Dementia F03.90    UTI (urinary tract infection) N39.0    Severe protein-calorie malnutrition ([facility] E43       Past Medical History:        Diagnosis Date    Arthritis     Atherosclerosis of native arteries of right leg with ulceration of other part of foot ([facility] 3/28/2018    Cancer (HCC)     lung    Carotid artery stenosis     Dementia     HIGH CHOLESTEROL     treated with medication    Hypertension     Parkinson disease ([facility]     Peripheral vascular disease ([facility]        Past Surgical History:        Procedure Laterality Date    BRONCHOSCOPY      CARDIAC CATHETERIZATION  2018    Left transfemoral right superficial femoral artery catheterization, right lower extremity runoff  Stiven Dumont MD   [address]      triple bypass    COLONOSCOPY      Dr. Mayo Every at [address] [facility]    HIP FRACTURE SURGERY Right 2011    Right hip hemiarthroplasty RAJAT Back MD       Social History:    Social History     Tobacco Use    Smoking status: Former Smoker     Packs/day: 1.25     Types: Cigarettes     Last attempt to quit: 1985     Years since quittin.8    Smokeless tobacco: Never Used   Substance Use Topics    Alcohol use: No     Comment: remote Hx of moderate alcohol use, quit                                 Counseling given: Not Answered      Vital Signs (Current):   Vitals:    19 1856 19 1906 19 2118 19 0745   BP: (!) 146/64  (!) 150/80 (!) 156/98   Pulse: 68  98    Resp: 20  18 20   Temp:   97.6 °F (36.4 °C) 98.1 °F (36.7 °C)   TempSrc:    Temporal   SpO2: 99% (!) 89% 98%    Weight:    128 lb 6.4 oz (58.2 kg)   Height:                                                  BP Readings from Last 3 Encounters:   19 (!) 156/98   06/10/19 104/60   19 (!) 161/79       NPO Status: Time of last liquid consumption: 0452                        Time of last solid consumption: 0452                        Date of last liquid consumption: 06/23/19                        Date of last solid food consumption: 06/23/19    BMI:   Wt Readings from Last 3 Encounters:   06/28/19 128 lb 6.4 oz (58.2 kg)   06/10/19 131 lb 5 oz (59.6 kg)   06/08/19 133 lb (60.3 kg)     Body mass index is 19.52 kg/m². CBC:   Lab Results   Component Value Date    WBC 9.5 06/28/2019    RBC 2.91 06/28/2019    HGB 9.7 06/28/2019    HCT 29.2 06/28/2019    .3 06/28/2019    RDW 15.4 06/28/2019     06/28/2019       CMP:   Lab Results   Component Value Date     06/28/2019    K 3.5 06/28/2019    K 4.3 06/23/2019     06/28/2019    CO2 15 06/28/2019    BUN 63 06/28/2019    CREATININE 1.8 06/28/2019    GFRAA 44 06/28/2019    LABGLOM 36 06/28/2019    GLUCOSE 120 06/28/2019    GLUCOSE 117 02/15/2012    PROT 7.1 06/23/2019    CALCIUM 8.8 06/28/2019    BILITOT 0.7 06/23/2019    ALKPHOS 121 06/23/2019    AST 18 06/23/2019    ALT <5 06/23/2019       POC Tests: No results for input(s): POCGLU, POCNA, POCK, POCCL, POCBUN, POCHEMO, POCHCT in the last 72 hours.     Coags:   Lab Results   Component Value Date    PROTIME 12.0 02/11/2017    PROTIME 19.6 10/10/2011    INR 1.1 02/11/2017    APTT 27.5 02/11/2017       HCG (If Applicable): No results found for: PREGTESTUR, PREGSERUM, HCG, HCGQUANT     ABGs: No results found for: PHART, PO2ART, QSV0NWO, FPX3LHP, BEART, Q6ITKIVP     Type & Screen (If Applicable):  No results found for: [facility]    Anesthesia Evaluation  Patient summary reviewed and Nursing notes reviewed no history of anesthetic complications:   Airway: Mallampati: II  TM distance: >3 FB   Neck ROM: full  Mouth opening: > = 3 FB Dental:          Pulmonary: breath sounds clear to auscultation                             Cardiovascular:  Exercise tolerance: poor (<4 METS),   (+) hypertension:, CAD:, hyperlipidemia      ECG reviewed               Beta Blocker:  Dose within 24 Hrs Neuro/Psych:   (+) psychiatric history:             ROS comment: History obtained via chart  H/O parkinsons GI/Hepatic/Renal:   (+) renal disease: CRI,           Endo/Other: Negative Endo/Other ROS   (+) : arthritis:., .                 Abdominal:           Vascular:   + PVD, aortic or cerebral, . Anesthesia Plan      MAC     ASA 3       Induction: intravenous. BIS  MIPS: Postoperative opioids intended and Prophylactic antiemetics administered. Anesthetic plan and risks discussed with patient. Use of blood products discussed with patient whom consented to blood products. Plan discussed with attending and CRNA.                   Bianca Rios DO   6/28/2019

## 2019-06-28 NOTE — CONSULTS
EXAMINATION:  GENERAL:  The patient is alert, oriented x2. HEENT:  Head is normocephalic. Ear, nose and throat are clear. NECK:  No neck masses. Thyroid is not enlarged. Trachea is central.  CHEST:  Symmetrical.  Lungs are clear to auscultation. CARDIOVASCULAR SYSTEM:  Within normal limits. ABDOMEN:  Soft, nontender, no organomegaly. No palpable masses. Bowel  sounds are active. EXTREMITIES:  Does not reveal any gross deformities. NEUROLOGICAL:  Examination is within normal limits. LABORATORY DATA:  Lab work reveals hemoglobin of 9.9 gm with a white  cell count of 12.9. The electrolytes are within normal limits. The  protime and INR are within normal limits. CLINICAL IMPRESSION:  1. Dysphagia. 2.  Malnutrition. 3.  Metastatic lung cancer. 4.  Hypotension. 5.  Dementia. 6.  Coronary artery disease. 7.  Peripheral avascular disease. PLAN:  To insert a percutaneous endoscopic gastrostomy tube in a.m.  tomorrow.         Bethanie Arellano MD    D: 06/27/2019 13:45:24       T: 06/27/2019 13:53:18     [email or web address]  Job#: [id number]     Doc#: [id number]    CC:  MD Aj Kauffman MD

## 2019-06-28 NOTE — PROGRESS NOTES
Nutrition Assessment    Type and Reason for Visit: Initial    Nutrition Recommendations: Start TF after PEG placement  Recommend Standard w/ Fiber @ 60 ml/hr to provide 1440 ml tv, 1728 kcal, 80 gm pro, 1163 ml free water  Noted significant hypernatremia, fluids per physician until resolved. Nutrition Assessment: Pt severely malnourished w/ noted muscle/fat wasting and poor PO intake w/ h/o lung CA s/p XRT. At now at increased nutritional risk d/t NPO order x4 days w/ prior poor PO intake, now pending PEG placement. Will provide TF recs and monitor    Malnutrition Assessment:  · Malnutrition Status: Meets the criteria for severe malnutrition  · Context: Chronic illness  · Findings of the 6 clinical characteristics of malnutrition (Minimum of 2 out of 6 clinical characteristics is required to make the diagnosis of moderate or severe Protein Calorie Malnutrition based on AND/ASPEN Guidelines):  1. Energy Intake-Less than or equal to 75% of estimated energy requirement, Greater than or equal to 1 month    2. Weight Loss-2% loss or greater, in 1 month  3. Fat Loss-Severe subcutaneous fat loss, Orbital, Triceps  4. Muscle Loss-Severe muscle mass loss, Temples (temporalis muscle), Clavicles (pectoralis and deltoids)  5. Fluid Accumulation-No significant fluid accumulation    6.   Strength-Not measured    Nutrition Risk Level: High    Nutrient Needs:  · Estimated Daily Total Kcal: (MSJ 1248 x 1.3 SF)  · Estimated Daily Protein (g): 75-85(1.3-1.5 gm/kg CBW)  · Estimated Daily Total Fluid (ml/day): per physician d/t significant hypernatremia    Nutrition Diagnosis:   · Problem: Severe malnutrition, In context of chronic illness  · Etiology: related to Catabolic illness(lung CA)     Signs and symptoms:  as evidenced by NPO status due to medical condition, Diet history of poor intake, Weight loss, Severe loss of subcutaneous fat, Severe muscle loss    Objective Information:  · Nutrition-Focused Physical Findings: pt disoriented, abd WDL, no noted edema, +I/Os, noted hypernatremia (158), dysphagia pending PEG placement  · Wound Type: Open Wounds  · Current Nutrition Therapies:  · Oral Diet Orders: NPO   · Anthropometric Measures:  · Ht: 5' 8\" (172.7 cm)   · Current Body Wt: 128 lb (58.1 kg)(bed scale 6/28)  · Admission Body Wt: 131 lb (59.4 kg)(stated)  · Usual Body Wt: 133 lb (60.3 kg)(actual per EMR 05/2019)  · % Weight Change:    3.8% wt loss x1 month  · Ideal Body Wt: 154 lb (69.9 kg), % Ideal Body 83%  · BMI Classification: BMI 18.5 - 24.9 Normal Weight    Nutrition Interventions:   Continued Inpatient Monitoring, Education not appropriate at this time, Coordination of Care    Nutrition Evaluation:   · Evaluation: Goals set   · Goals: Initiation of TF w/ tolerance at goal rate    · Monitoring: Nutrition Progression, TF Intake, TF Tolerance, Skin Integrity, Wound Healing, I&O, Mental Status/Confusion, Weight, Pertinent Labs, Chewing/Swallowing, Monitor Bowel Function      Electronically signed by Anayeli Chang, MS, RD, LD on 6/28/19 at 10:47 AM    Contact Number: [phone]

## 2019-06-29 PROBLEM — N12 PYELONEPHRITIS: Status: ACTIVE | Noted: 2019-01-01

## 2019-06-29 PROBLEM — A41.9 SEPSIS (HCC): Status: ACTIVE | Noted: 2019-01-01

## 2019-06-29 PROBLEM — E87.0 HYPERNATREMIA: Status: ACTIVE | Noted: 2019-01-01

## 2019-06-29 PROBLEM — I10 HYPERTENSION, UNCONTROLLED: Status: ACTIVE | Noted: 2019-01-01

## 2019-06-29 NOTE — PROGRESS NOTES
dilatation. CT CHEST WO CONTRAST   Final Result   Persistent nodular soft tissue density in the right perihilar region   with the strandy and patchy densities extending to the right upper   lobe and right middle lobe which may represent residual malignancy   with scarring and atelectasis. There may be some superimposed   pneumonic infiltrates in the right upper lobe. XR CHEST PORTABLE   Final Result   Persistent slightly progressive patchy and nodular infiltrative   density in the right suprahilar region and right upper lobe which may   represent persistent malignancy with  progressive superimposed   pneumonic infiltrates.                   Assessment and Plan:      Pyelonephritis  Ecoli bactremia from above  CA lung with radiation therapy/keytruda     plan    Repeat swallow eval also shows aspiration   ceftiraxone 2gm q daily  To p.o. antibiotics at discharge through PEG tube  And Pyridium for dysuria      Electronically signed by Barbee Mcardle, MD on 6/29/2019 at 3:33 PM

## 2019-06-30 NOTE — CONSULTS
[facility] MD Thad Kign MD Hollis Proffer, MD Demetria Kansas, DO, 84 Jenny Serrano MD .  Consultation  Patient's Name: Jose Ramon Jamison.  5:10 PM  6/30/2019    Nephrologist: Lester Leavitt MD    Reason for Consult:  Hyper nateremia   Requesting Physician:  Kenny Guan MD    History Obtained From: records     History of Present Ilness:       80 y.o. male with squamous cell ca of lung getting radiation therapy on keytruda before with overall poor conditioning , HTN , CAD . He presented with low BP , fever . He was found to have pyelonephritis with [facility] bacteremia   Nephrology consulted for hyper Na . His Na has been creeping up over the last several days 141>147>155>158>159>161   He has also baseline ckd with cr at baseline around 1.7   When seeing him in room ,he looks very dehydrated . Very deconditioned , very poor muscle mass   He has TF going on with FWF at 250 cc q 6hr . He has no LE edema . He is on RA . He is receiving also D5w at 125 cc /hr started this am         Past Medical History:   Diagnosis Date    Arthritis     Atherosclerosis of native arteries of right leg with ulceration of other part of foot ([address] 3/28/2018    Cancer (HCC)     lung    Carotid artery stenosis     Dementia     HIGH CHOLESTEROL     treated with medication    Hypertension     Parkinson disease ([address]     Peripheral vascular disease ([address]        Past Surgical History:   Procedure Laterality Date    BRONCHOSCOPY      CARDIAC CATHETERIZATION  04/04/2018    Left transfemoral right superficial femoral artery catheterization, right lower extremity runoff  Liliam Mcrae MD   [address]      triple bypass    COLONOSCOPY  2011    Dr. Daria Broussard at [facility]    HIP FRACTURE SURGERY Right 09/06/2011    Right hip hemiarthroplasty RAJAT Mackey MD       Family History   Problem Relation Age of Onset    Cancer Mother CV : normal rate  Lungs: breath sound heard b/l . Poor inspiratory effort   Abd : soft , NT ,  Skin : soft, dry .    MSK : poor muscle mass       Data:   Labs:  CBC with Differential:  Lab Results   Component Value Date    WBC 14.4 06/30/2019    RBC 3.31 06/30/2019    HGB 10.6 06/30/2019    HCT 34.3 06/30/2019     06/30/2019    .6 06/30/2019    MCH 32.0 06/30/2019    MCHC 30.9 06/30/2019    RDW 15.4 06/30/2019    SEGSPCT 71 11/15/2013    BLASTSPCT 2 09/09/2011    METASPCT 7 09/09/2011    LYMPHOPCT 5.1 06/30/2019    MONOPCT 7.4 06/30/2019    MYELOPCT 0.9 06/26/2019    BASOPCT 0.3 06/30/2019    MONOSABS 1.06 06/30/2019    LYMPHSABS 0.74 06/30/2019    EOSABS 0.21 06/30/2019    BASOSABS 0.04 06/30/2019     CMP:  Lab Results   Component Value Date     06/30/2019    K 3.6 06/30/2019    K 4.3 06/23/2019     06/30/2019    CO2 21 06/30/2019    BUN 54 06/30/2019    CREATININE 1.8 06/30/2019    GFRAA 44 06/30/2019    LABGLOM 36 06/30/2019    GLUCOSE 155 06/30/2019    GLUCOSE 117 02/15/2012    PROT 7.1 06/23/2019    LABALBU 3.8 06/23/2019    LABALBU 4.1 02/15/2012    CALCIUM 8.6 06/30/2019    BILITOT 0.7 06/23/2019    ALKPHOS 121 06/23/2019    AST 18 06/23/2019    ALT <5 06/23/2019     Ionized Calcium:  No results found for: IONCA  Magnesium:  No results found for: MG  Phosphorus:  No results found for: PHOS  U/A:  Lab Results   Component Value Date    COLORU Yellow 06/23/2019    PHUR 5.0 06/23/2019    WBCUA >20 06/23/2019    RBCUA 2-5 06/23/2019    BACTERIA MANY 06/23/2019    CLARITYU SL CLOUDY 06/23/2019    SPECGRAV 1.020 06/23/2019    LEUKOCYTESUR MODERATE 06/23/2019    UROBILINOGEN 0.2 06/23/2019    BILIRUBINUR Negative 06/23/2019    BLOODU LARGE 06/23/2019    GLUCOSEU Negative 06/23/2019     Microalbumen/Creatinine ratio:  No components found for: RUCREAT  Iron Saturation:  No components found for: PERCENTFE  TIBC:  No results found for: TIBC  FERRITIN:  No results found for: FERRITIN

## 2019-06-30 NOTE — PROGRESS NOTES
Patient incontinent and unable to notify nurse when he has voided. Patient has been checked and changed. After seeing new urine, patient was bladder scanned and post void residual measured 113mL.

## 2019-07-01 NOTE — CARE COORDINATION
Per Dominga Negron from [facility], they cannot accept the patient. Spoke with daughter Spencer Blair. She said that her father is completely done with radiation and chemo for now, if that is the barrier to preventing acceptance. Danilo asked if [facility] could reevaluate. Called and spoke to Susan at [facility]. He will reevaluate. In the meantime, Danilo asked me to try [facility]. referral made to [facility]. Await acceptance from her or [facility].   Maikel Dahl RN CM

## 2019-07-01 NOTE — PROGRESS NOTES
Pt seen for dysphagia therapy. Oral care was provided prior to trial sips of water. Completed trial of water to promote functional integration of musculature targeted during dysphagia intervention. x8 teaspoon sips of water. Weak cough noted after last trial of water. Will continue.     [facility]  Speech Language Pathologist Student Intern

## 2019-07-01 NOTE — PROGRESS NOTES
[facility] PROGRESS NOTE                                                                       7/1/2019          SUBJECTIVE:    Pt seen in office by keeley leal on 20 June  Pt advised to start myrbetriq   Not sure if he did or not  pvr estimated to be 113  Pt remains incontineent     OBJECTIVE:    BP (!) 146/80   Pulse 58   Temp 97.8 °F (36.6 °C) (Temporal)   Resp 16   Ht 5' 8\" (1.727 m)   Wt 129 lb 14.4 oz (58.9 kg)   SpO2 92%   BMI 19.75 kg/m²     PHYSICAL EXAMINATION:  Very lethargic  Skin: dry, without rashes  Respirations: non-labored, intact  Abdomen: soft, non-tender, non-distended bladder not palp      Lab Results   Component Value Date    WBC 19.1 (H) 07/01/2019    HGB 10.0 (L) 07/01/2019    HCT 32.4 (L) 07/01/2019    .2 (H) 07/01/2019     07/01/2019       Lab Results   Component Value Date    CREATININE 1.8 (H) 07/01/2019       No results found for: PSA    REVIEW OF SYSTEMS:    CONSTITUTIONAL: negative  HEENT: negative  HEMATOLOGIC: negative  ENDOCRINE: negative  RESPIRATORY: negative  CV: negative  GI: negative  NEURO: negative  ORTHOPEDICS: negative  PSYCHIATRIC: negative  : as above    PAST FAMILY HISTORY:    Family History   Problem Relation Age of Onset    Cancer Mother         [address] unconfirmed    Cancer Other         sister - skin     PAST SOCIAL HISTORY:    Social History     Socioeconomic History    Marital status:       Spouse name: None    Number of children: None    Years of education: None    Highest education level: None   Occupational History    None   Social Needs    Financial resource strain: None    Food insecurity:     Worry: None     Inability: None    Transportation needs:     Medical: None     Non-medical: None   Tobacco Use    Smoking status: Former Smoker     Packs/day: 1.25     Types: Cigarettes     Last attempt to quit: 9/6/1985 (peripheral vascular disease) ([address]    CAD (coronary artery disease) of artery bypass graft    Ileus (HCC)    Hypokalemia    AF (atrial fibrillation) (HCC)    Carotid artery stenosis    PVD (peripheral vascular disease) with claudication (HCC)    General weakness    Lung cancer ([address]    Atherosclerosis of native arteries of left leg with ulceration of other part of foot ([address]    Atherosclerosis of native arteries of right leg with ulceration of other part of foot ([address]    Fever    Dementia    UTI (urinary tract infection)    Severe protein-calorie malnutrition (HCC)    Pyelonephritis    Hypertension, uncontrolled    Hypernatremia       PLAN:  Pt very frail and lethargic  May not be possible to achieve continence  Jimenez may be the rx of choice at present      Perla Mccarty M.D.  7/1/2019  1:06 PM

## 2019-07-01 NOTE — PLAN OF CARE
Problem: Falls - Risk of:  Goal: Will remain free from falls  Description  Will remain free from falls  7/1/2019 0235 by Nadia Myers RN  Outcome: Met This Shift  6/30/2019 1623 by Mortimer Landing, RN  Outcome: Met This Shift  Goal: Absence of physical injury  Description  Absence of physical injury  7/1/2019 0235 by Nadia Myers RN  Outcome: Met This Shift  6/30/2019 1623 by Mortimer Landing, RN  Outcome: Met This Shift

## 2019-07-02 NOTE — PROGRESS NOTES
OT BEDSIDE TREATMENT NOTE      Date:2019  Patient Name: Patricia Kidd. MRN: [id number]  : 1933  Room: [address] [place] [address]     Evaluating [facility], OTR/L #[id number]     AM-PAC Daily Activity Raw Score:   Recommended Adaptive Equipment: TBD      Diagnosis: Fever [R50.9]  Fever [R50.9]     Pt presented to ED on 19 for hypotension and fever  Pertinent Medical History: Afib, arthritis, lung CA, carotid artery stenosis, dementia, HTN, Parkinson disease, PVD     Precautions:  Falls, TSM, bed alarm, heel protectors, Parkinson's, dementia, incontinence, L air cast & knee brace per pt. Per OT Eval:   Home Living: Pt lives with 2 daughters. Equipment owned: ww, w/c, CPAP  Above per chart     Prior Level of Function: Unable to obtain from pt. Family/caregiver not present    Pain: No c/o or indication of pain this date. Cognition: A&O: 1/4 (self only); Follows 1 step directions with ~50% accuracy. Pt requires verbal/visual cues for initiation and sequencing. Pt minimally verbal throughout treatment. Memory:  poor              Sequencing:  poor              Problem solving:  poor              Judgement/safety:  poor    Functional Assessment:    Initial Eval Status  Date: 19 Treatment Status  Date:  19 Short Term Goals  Treatment frequency: PRN    Feeding NPO   NPO -   Grooming Moderate Assist   Washed hands and face 2x  Max A  Washing face completed seated at EOB with hand over had assist required to complete. Pt deferred completing oral care with mouth swabs this date. Stand by Assist    UB Dressing Maximal Assist   gown  Dependent  Adjusting hospital gown over B shoulders and around trunk while seated at EOB. Poor self initiation noted during task. Moderate Assist    LB Dressing Dependent   Dependent  Seated at EOB. Therapist offered to assist pt to don L air cast and knee brace however pt deferred. Maximal Assist    Bathing Dependent  Per Eval  Will continue to assess.

## 2019-07-02 NOTE — PROGRESS NOTES
Distended gallbladder with the persistent common bile duct dilatation. CT CHEST WO CONTRAST   Final Result   Persistent nodular soft tissue density in the right perihilar region   with the strandy and patchy densities extending to the right upper   lobe and right middle lobe which may represent residual malignancy   with scarring and atelectasis. There may be some superimposed   pneumonic infiltrates in the right upper lobe. XR CHEST PORTABLE   Final Result   Persistent slightly progressive patchy and nodular infiltrative   density in the right suprahilar region and right upper lobe which may   represent persistent malignancy with  progressive superimposed   pneumonic infiltrates.                   Assessment and Plan:      Pyelonephritis  Ecoli bactremia from above  CA lung with radiation therapy/keytruda     Plan  Leukocytosis from volume contraction, hypernatremic, increase free fluid through PEG tube  Dysuria improving  ceftiraxone 2gm q daily day 8 of appropriate therapy  To p.o. antibiotics at discharge through PEG tube  And Pyridium for dysuria      Electronically signed by Lesley Jones MD on 7/1/2019 at 9:30 PM

## 2019-07-02 NOTE — CARE COORDINATION
Per Esther Prather from [facility], they have started precert. Spoke with patient's daughter Delores Rolle, she is aware of acceptance and need for insurance approval. Explained to her that she would need to have a plan B in case insurance denies rehab, such as private pay at the facility or home with home health care and/or private duty caregivers. I also told her she needs to look into applying for Medicaid for her father. She voiced understanding.   Yadira Metzger RN CM

## 2019-07-02 NOTE — PROGRESS NOTES
[facility] PROGRESS NOTE                                                                       2019          SUBJECTIVE:    Pt continues to be incontinent and confused  Attempts in past have been made to keep pt catheter free    OBJECTIVE:    BP (!) 182/84   Pulse 98   Temp 98.2 °F (36.8 °C) (Temporal)   Resp 16   Ht 5' 8\" (1.727 m)   Wt 129 lb 14.4 oz (58.9 kg)   SpO2 94%   BMI 19.75 kg/m²     PHYSICAL EXAMINATION:  Skin: dry, without rashes  Respirations: non-labored, intact  Abdomen: soft, non-tender, non-distended      Lab Results   Component Value Date    WBC 13.3 (H) 2019    HGB 10.3 (L) 2019    HCT 33.5 (L) 2019    .3 (H) 2019     2019       Lab Results   Component Value Date    CREATININE 1.7 (H) 2019       No results found for: PSA    REVIEW OF SYSTEMS:    CONSTITUTIONAL: negative  HEENT: negative  HEMATOLOGIC: negative  ENDOCRINE: negative  RESPIRATORY: negative  CV: negative  GI: negative  NEURO: negative  ORTHOPEDICS: negative  PSYCHIATRIC: negative  : as above    PAST FAMILY HISTORY:    Family History   Problem Relation Age of Onset    Cancer Mother         [address] unconfirmed    Cancer Other         sister - skin     PAST SOCIAL HISTORY:    Social History     Socioeconomic History    Marital status:       Spouse name: None    Number of children: None    Years of education: None    Highest education level: None   Occupational History    None   Social Needs    Financial resource strain: None    Food insecurity:     Worry: None     Inability: None    Transportation needs:     Medical: None     Non-medical: None   Tobacco Use    Smoking status: Former Smoker     Packs/day: 1.25     Types: Cigarettes     Last attempt to quit: 1985     Years since quittin.8    Smokeless tobacco: Never Used   Substance and Sexual Activity    Alcohol use: No     Comment: remote Hx of moderate alcohol use, quit 1985    Drug use: No    Sexual activity: Not Currently   Lifestyle    Physical activity:     Days per week: None     Minutes per session: None    Stress: None   Relationships    Social connections:     Talks on phone: None     Gets together: None     Attends Restorationist service: None     Active member of club or organization: None     Attends meetings of clubs or organizations: None     Relationship status: None    Intimate partner violence:     Fear of current or ex partner: None     Emotionally abused: None     Physically abused: None     Forced sexual activity: None   Other Topics Concern    None   Social History Narrative    Lives in [facility] [place] with 2 daughters - retired.        Scheduled Meds:   pantoprazole  40 mg Intravenous BID    phenazopyridine  200 mg Oral TID WC    cefTRIAXone (ROCEPHIN) IV  2 g Intravenous Q24H    enoxaparin  30 mg Subcutaneous Daily    sodium chloride flush  10 mL Intravenous 2 times per day    aspirin  81 mg Oral Daily    carbidopa-levodopa  1 tablet Oral TID    clopidogrel  75 mg Oral Daily    digoxin  250 mcg Oral Daily    donepezil  10 mg Oral Daily    doxazosin  2 mg Oral Daily    gabapentin  100 mg Oral BID    lisinopril  10 mg Oral Daily    memantine  5 mg Oral BID    mirtazapine  7.5 mg Oral Nightly    therapeutic multivitamin-minerals  1 tablet Oral Daily    pravastatin  40 mg Oral Daily    propranolol  20 mg Oral BID    vitamin D  2,000 Units Oral Daily     Continuous Infusions:   sodium chloride 60 mL/hr at 07/01/19 2230     PRN Meds:.hydrALAZINE, LORazepam, barium sulfate, haloperidol lactate, hydrALAZINE, sodium chloride flush, acetaminophen    ASSESSMENT:    Patient Active Problem List   Diagnosis    Fracture of hip, right, closed ([facility]    HTN (hypertension)    HLD (hyperlipidemia)    [facility]VD [facility]) [facility]    CAD (coronary artery disease)

## 2019-07-02 NOTE — CARE COORDINATION
Per Lin Santana from [facility], the can accept patient. I called Lin Santana from therapy department ext [phone] for updated PT/OT notes so that they can begin precert.   Livia Seaman RN CM

## 2019-07-02 NOTE — PROGRESS NOTES
laryngeal penetration seen in relation to   coughing. This procedure was performed by Imtiaz Salazar. Talon Nicholas CNP under the   indirect supervision of  Risa Zuniga MD.      The exam has been dictated and signed by Imtiaz Salazar. Talon Nicholas CNP. Jeremiah Mcdonnell MD, Radiologist, have reviewed the images and   report and concur with these findings. XR CHEST PORTABLE   Final Result      Redemonstration of opacities overlying right upper lobe and medial   left lower lobe which could suggest persistent pneumonia or areas of   atelectasis appearing similar since previous examination. Continued   follow-up could be helpful for further evaluation. CT ABDOMEN PELVIS WO CONTRAST Additional Contrast? None   Final Result   Large amount retained fecal matter concerning for constipation. Thickened urinary bladder concerning for cystitis. Distended gallbladder with the persistent common bile duct dilatation. CT CHEST WO CONTRAST   Final Result   Persistent nodular soft tissue density in the right perihilar region   with the strandy and patchy densities extending to the right upper   lobe and right middle lobe which may represent residual malignancy   with scarring and atelectasis. There may be some superimposed   pneumonic infiltrates in the right upper lobe. XR CHEST PORTABLE   Final Result   Persistent slightly progressive patchy and nodular infiltrative   density in the right suprahilar region and right upper lobe which may   represent persistent malignancy with  progressive superimposed   pneumonic infiltrates.                 Assessment    Active Problems:    HTN (hypertension)    HLD (hyperlipidemia)    PVD (peripheral vascular disease) (HCC)    PVD (peripheral vascular disease) with claudication (HCC)    Lung cancer (HCC)    Fever    Dementia    UTI (urinary tract infection)    Severe protein-calorie malnutrition (HCC)    Pyelonephritis    Hypertension, uncontrolled

## 2019-07-03 PROBLEM — G20 DEMENTIA DUE TO PARKINSON'S DISEASE WITHOUT BEHAVIORAL DISTURBANCE (HCC): Status: ACTIVE | Noted: 2019-01-01

## 2019-07-03 PROBLEM — R13.10 DYSPHAGIA: Status: ACTIVE | Noted: 2019-01-01

## 2019-07-03 PROBLEM — F02.80 DEMENTIA DUE TO PARKINSON'S DISEASE WITHOUT BEHAVIORAL DISTURBANCE (HCC): Status: ACTIVE | Noted: 2019-01-01

## 2019-07-03 NOTE — PROGRESS NOTES
ID Progress Note                [address] [facility] [address], [place], [address] [facility]            Phone [phone]     Fax [phone]      Chief complaint   unchanged    Subjective:  Pt is still confused  Status post PEG tube placement  The patient is awake and alert. Tolerating medications. Reports no side effects. Afebrile. 10 ROS otherwise negative unless otherwise specified above. Objective:    Vitals:    07/03/19 1100   BP: 116/66   Pulse: 69   Resp:    Temp:    SpO2:      General appearance: more alert and Awake but not oriented  Skin: Warm and dry  Eyes: Pink palpebral conjunctivae. PERRL  Ears: External ears normal.  Nose/Sinuses: Nares normal.  Oropharynx: Oropharynx clear with no exudates seen  Neck: Neck supple. Lungs: Lungs clear to auscultation bilaterally. No rhonchi, crackles or wheezes  Heart: S1 S2  Regular rate and rhythm. No rub, murmur or gallop  Abdomen: Abdomen soft, non-tender. BS normal. No masses, organomegaly  Extremities: No edema, Peripheral pulses palpable  Musculoskeletal: Muscular strength appears intact.  No joint effusion, tenderness, swelling or warmth        Labs:  Recent Labs     07/01/19  0534 07/02/19  0643 07/03/19  0436   WBC 19.1* 13.3* 11.0   RBC 3.05* 3.15* 2.86*   HGB 10.0* 10.3* 9.4*   HCT 32.4* 33.5* 29.8*   .2* 106.3* 104.2*   MCH 32.8 32.7 32.9   MCHC 30.9* 30.7* 31.5*   RDW 15.3* 15.2* 14.7    186 180   MPV 11.6 12.4* 12.4*     CMP:    Lab Results   Component Value Date     07/03/2019    K 3.8 07/03/2019    K 4.3 06/23/2019     07/03/2019    CO2 26 07/03/2019    BUN 43 07/03/2019    CREATININE 1.4 07/03/2019    GFRAA 58 07/03/2019    LABGLOM 48 07/03/2019    GLUCOSE 191 07/03/2019    GLUCOSE 117 02/15/2012    PROT 7.1 06/23/2019    LABALBU 3.8 06/23/2019    LABALBU 4.1 02/15/2012    CALCIUM 8.4 07/03/2019    BILITOT 0.7 06/23/2019    ALKPHOS 121 06/23/2019    AST 18 06/23/2019    ALT <5 06/23/2019          Microbiology

## 2019-07-03 NOTE — PROGRESS NOTES
this area   partial Partial with assist yes     Additional Comments: Pt supine in bed at start of session and agreeable to treatment. Pt A&O to self only. Pt transferred to EOB with assistance for trunk and B LE. Required assistance to maintain upright posture due to R lateral lean. Therapist donned R ankle brace and shoes for STS. Pt performed STS x 3 reps verbal and tactile cues necessary for upright posture with patient fatiguing quickly with posture declining with each rep. PT returned to supine requiring assist for BLE and trunk. Pt left in chair position with all needs met and call light within reach.        Time in: 1505  Time out: 6635 [address] [id number]

## 2019-07-03 NOTE — PROGRESS NOTES
Nutrition Assessment    Type and Reason for Visit: Reassess    Nutrition Recommendations: Continue current TF    Noted current hyperglycemia w/ h/o variable BSL during hospital course. If remains hyperglycemic, may benefit from transition to diabetic formula. If diabetic formula warranted, recommend Diabetic @ 60 ml/hr to provide 1728 kcal, 86 gm pro, 1159 ml free water    Nutrition Assessment: Pt continues to be severely malnourished during adm, now w/ TF at goal via new PEG. Continues to be at risk d/t current EN dependence w/ h/o lung CA s/p XRT. Noted variable BSL, will provide updated recs and monitor    Malnutrition Assessment:  · Malnutrition Status: Meets the criteria for severe malnutrition  · Context: Chronic illness  · Findings of the 6 clinical characteristics of malnutrition (Minimum of 2 out of 6 clinical characteristics is required to make the diagnosis of moderate or severe Protein Calorie Malnutrition based on AND/ASPEN Guidelines):  1. Energy Intake-Less than or equal to 75% of estimated energy requirement, Greater than or equal to 1 month    2. Weight Loss-2% loss or greater, in 1 month  3. Fat Loss-Severe subcutaneous fat loss, Orbital, Triceps, Fat overlying the ribs  4. Muscle Loss-Severe muscle mass loss, Temples (temporalis muscle), Clavicles (pectoralis and deltoids), Thigh (quadriceps), Calf (gastrocnemius), Interosseous  5. Fluid Accumulation-No significant fluid accumulation    6.  Strength-Not measured    Nutrition Risk Level:  Moderate    Nutrition Needs:  · Estimated Daily Total Kcal: (MSJ 1261 x 1.3 SF)  · Estimated Daily Protein (g): 80-90(1.3-1.5 gm/kg CBW)  · Estimated Daily Fluid (ml/day): per renal management d/t hypernatremia    Nutrition Diagnosis:   · Problem: Severe malnutrition, In context of chronic illness  · Etiology: related to Catabolic illness(lung CA)     Signs and symptoms:  as evidenced by NPO status due to medical condition, Diet history of

## 2019-07-03 NOTE — PROGRESS NOTES
[facility]   Progress Note    7/3/2019    SUBJECTIVE:   7/1:  Very lethargic. Opens eyes. Weak and frail. 7/2:  Remains very lethargic. Weak and frail. Opens eyes slightly to name. 7/3: More awake and alert today. Confused. Remains weak and frail    PROBLEM LIST:    Active Problems:    HTN (hypertension)    HLD (hyperlipidemia)    PVD (peripheral vascular disease) (HCC)    PVD (peripheral vascular disease) with claudication (HCC)    Lung cancer (HCC)    Fever    Dementia    UTI (urinary tract infection)    Severe protein-calorie malnutrition (HCC)    Pyelonephritis    Hypertension, uncontrolled    Hypernatremia  Resolved Problems:    * No resolved hospital problems.  *         DIET:    DIET TUBE FEED CONTINUOUS/CYCLIC NPO; STANDARD WITH FIBER; Gastrostomy; Continuous; 60     MEDS (scheduled):    pantoprazole  40 mg Intravenous BID    phenazopyridine  200 mg Oral TID WC    enoxaparin  30 mg Subcutaneous Daily    sodium chloride flush  10 mL Intravenous 2 times per day    aspirin  81 mg Oral Daily    carbidopa-levodopa  1 tablet Oral TID    clopidogrel  75 mg Oral Daily    digoxin  250 mcg Oral Daily    donepezil  10 mg Oral Daily    doxazosin  2 mg Oral Daily    gabapentin  100 mg Oral BID    lisinopril  10 mg Oral Daily    memantine  5 mg Oral BID    mirtazapine  7.5 mg Oral Nightly    therapeutic multivitamin-minerals  1 tablet Oral Daily    pravastatin  40 mg Oral Daily    propranolol  20 mg Oral BID    vitamin D  2,000 Units Oral Daily       MEDS (infusions):      MEDS (prn):  glycerin moisturizing mouth spray, hydrALAZINE, LORazepam, barium sulfate, haloperidol lactate, hydrALAZINE, sodium chloride flush, acetaminophen    PHYSICAL EXAM:     Patient Vitals for the past 24 hrs:   BP Temp Temp src Pulse Resp SpO2 Weight   07/03/19 1115 -- -- -- -- -- -- 131 lb 8 oz (59.6 kg)   07/03/19 1100 116/66 -- -- 69 -- -- --   07/03/19 0730 (!) 173/79 97.1 °F (36.2 °C) Temporal 92

## 2019-07-03 NOTE — PLAN OF CARE
Problem: Falls - Risk of:  Goal: Will remain free from falls  Description  Will remain free from falls  7/3/2019 1046 by Mary Shook RN  Outcome: Met This Shift     Problem: Falls - Risk of:  Goal: Absence of physical injury  Description  Absence of physical injury  7/3/2019 1046 by Mary Shook RN  Outcome: Met This Shift

## 2019-07-04 NOTE — PROGRESS NOTES
[facility] PROGRESS NOTE                                                                       2019        CHIEF UROLOGIC COMPLAINT: Urinary tract infection, urinary incontinence, bacteremia    HISTORY OF PRESENT ILLNESS:  Patient without new complaints. He is resting comfortably in bed. Tube feeds are running. He denies a sensation of bladder fullness but I am uncertain if he understands all of my questions today. He does not his head and is very pleasant in his demeanor. He denies pain. He denies fever, chills, nausea, vomiting. REVIEW OF SYSTEMS:   CONSTITUTIONAL: negative  HEENT: negative  HEMATOLOGIC: negative  ENDOCRINE: negative  RESPIRATORY: negative  CV: negative  GI: Dysphagia, chronic tube feeds  NEURO: negative  ORTHOPEDICS: negative  PSYCHIATRIC: negative  : as above    PAST FAMILY HISTORY:    Family History   Problem Relation Age of Onset    Cancer Mother         [address] unconfirmed    Cancer Other         sister - skin     PAST SOCIAL HISTORY:    Social History     Socioeconomic History    Marital status:       Spouse name: None    Number of children: None    Years of education: None    Highest education level: None   Occupational History    None   Social Needs    Financial resource strain: None    Food insecurity:     Worry: None     Inability: None    Transportation needs:     Medical: None     Non-medical: None   Tobacco Use    Smoking status: Former Smoker     Packs/day: 1.25     Types: Cigarettes     Last attempt to quit: 1985     Years since quittin.8    Smokeless tobacco: Never Used   Substance and Sexual Activity    Alcohol use: No     Comment: remote Hx of moderate alcohol use, quit     Drug use: No    Sexual activity: Not Currently   Lifestyle    Physical activity:     Days per week: None     Minutes per session: None    Stress: None   Relationships    Social connections:     Talks on phone: None     Gets together: None     Attends Baptism service: None     Active member of club or organization: None     Attends meetings of clubs or organizations: None     Relationship status: None    Intimate partner violence:     Fear of current or ex partner: None     Emotionally abused: None     Physically abused: None     Forced sexual activity: None   Other Topics Concern    None   Social History Narrative    Lives in [place] with 2 daughters - retired.        Scheduled Meds:   pantoprazole  40 mg Intravenous BID    phenazopyridine  200 mg Oral TID WC    enoxaparin  30 mg Subcutaneous Daily    sodium chloride flush  10 mL Intravenous 2 times per day    aspirin  81 mg Oral Daily    carbidopa-levodopa  1 tablet Oral TID    clopidogrel  75 mg Oral Daily    digoxin  250 mcg Oral Daily    donepezil  10 mg Oral Daily    doxazosin  2 mg Oral Daily    gabapentin  100 mg Oral BID    lisinopril  10 mg Oral Daily    memantine  5 mg Oral BID    mirtazapine  7.5 mg Oral Nightly    therapeutic multivitamin-minerals  1 tablet Oral Daily    pravastatin  40 mg Oral Daily    propranolol  20 mg Oral BID    vitamin D  2,000 Units Oral Daily     Continuous Infusions:  PRN Meds:.glycerin moisturizing mouth spray, hydrALAZINE, LORazepam, barium sulfate, haloperidol lactate, hydrALAZINE, sodium chloride flush, acetaminophen    BP (!) 163/70   Pulse 65   Temp 97.9 °F (36.6 °C) (Temporal)   Resp 16   Ht 5' 8\" (1.727 m)   Wt 131 lb 8 oz (59.6 kg)   SpO2 99%   BMI 19.99 kg/m²     Lab Results   Component Value Date    WBC 11.0 07/03/2019    HGB 9.4 (L) 07/03/2019    HCT 29.8 (L) 07/03/2019    .2 (H) 07/03/2019     07/03/2019       Lab Results   Component Value Date    CREATININE 1.4 (H) 07/03/2019       PHYSICAL EXAMINATION:  Skin dry, without rashes  Respirations non-labored, intact, CPAP in place  Abdomen soft, non-tender, non-distended  Alert and pleasant. Incontinent of urine    ASSESSMENT AND PLAN:  1. Urinary tract infection with bacteremia. Antibiotics per infectious disease. 2.  Chronic urinary incontinence. Postvoid residual was approximately 113 mL. There is no role for Jimenez catheter especially since the patient and his family request that the catheter not be placed. There are no further acute, urological issues at this time. Please call if any questions or concerns. Follow-up with Dr. Margaret Boyd in the future.     Mary Jo Sanchez M.D.  7/4/2019  5:55 AM

## 2019-07-04 NOTE — CARE COORDINATION
ADVANCED CARE PLANNING    Patient Name: Antonino Templeton YOB: 1933              MRN:    [id number]  Admission Date:  6/23/2019  8:29 AM    Active Diagnoses:  Principal Problem:    Parkinson disease ([facility] [address]  Active Problems:    Lung cancer ([facility] [address]    Severe protein-calorie malnutrition ([facility] [address]    Hypernatremia    Dementia due to Parkinson's disease without behavioral disturbance (HCC)    HTN (hypertension)    HLD (hyperlipidemia)    PVD (peripheral vascular disease) (HCC)    PVD (peripheral vascular disease) with claudication (HCC)    Fever    UTI (urinary tract infection)    Pyelonephritis    Hypertension, uncontrolled    Cancer ([facility] [address]    Dysphagia  Resolved Problems:    * No resolved hospital problems. *      These active diagnoses are of sufficient risk that focused discussion on advanced care planning is indicated in order to allow the patient to thoughtfully consider personal goals of care; and, if situations arise that prevent the patient to personally give input, to ensure appropriate representation of their personal desire for different levels and levels of care. Persons present in discussion: Antonino Templeton, Aida Butt DO, Family members: Daughter: Adrienne Mora, daughter: Lauren Jonas. Discussion: I reviewed his admission for Parkinson's disease and his desires for ongoing aggressive care (family does not wish to make any changes at this time but it was discussed extensively what would occur if the patient were to remain full code and I recommended that the family think about the wishes of their father), including potential intubation and mechanical ventilation (unsure, will think about this); also discussed who would speak on his behalf should he be unable to do so (daughter Adrienne Mora) and discussed what conversations he has had with his family so they understand his desires if such a situation occurred now or in the future.  I presented and explained the availability of our

## 2019-07-04 NOTE — CONSULTS
Palliative Care Department    Palliative Care Consult      Chief Complaint: [facility]. is a 80 y.o. male with a chief complaint of fever and altered mental status. Assessment/Plan        Active Hospital Problems    Diagnosis Date Noted    Dementia due to Parkinson's disease without behavioral disturbance ([address] [G20, F02.80] 07/03/2019    Dysphagia [R13.10] 07/03/2019    Parkinson disease ([address] [G20]     Cancer ([address] [C80.1]     Pyelonephritis [N12] 06/29/2019    Hypertension, uncontrolled [I10] 06/29/2019    Hypernatremia [E87.0] 06/29/2019    Severe protein-calorie malnutrition ([address] [E43] 06/28/2019    UTI (urinary tract infection) [N39.0] 06/24/2019    Fever [R50.9] 06/23/2019    Lung cancer ([address] [C34.90] 09/27/2017    PVD (peripheral vascular disease) with claudication ([address] [I73.9] 06/22/2015    HLD (hyperlipidemia) [E78.5] 09/05/2011    HTN (hypertension) [I10] 09/05/2011    PVD (peripheral vascular disease) ([address] [I73.9] 09/05/2011       Palliative Care Goals of Care/Code Status:   -The pleasure reviewing goals of care and CODE STATUS with the patient's child, Marianna Adams at [phone], and reviewed goals of care and CODE STATUS with her. She stated that both her and her sister Cherry Bruno are Durable Power of  for [employer]. Adrienne Abby stated that the patient is otherwise very active at home but requires only minimal assistance with all ADLs. She stated that the patient had a great appetite just days prior to admission. She stated that she would like the patient to remain a full code at this time with all medical treatment available to him. Her ultimate goal as well as her sisters is to get the patient back home where they can continue to care for him. They are aware that he is deconditioned from being in bed for 11 days. They are hoping that he will get to a rehab center and then eventually home.   I did discuss the patient's Parkinson's disease with his life, remain at home, preserve independence/autonomy/control and continue current management  Advance Directives: full code  Surrogate:HCPOA  Prognosis: unknown  Spiritual assessment: No spiritual distress identified   Bereavement and grief: to be determined. Past Medical History:   Diagnosis Date    Arthritis     Atherosclerosis of native arteries of right leg with ulceration of other part of foot ([facility] [address] 3/28/2018    Cancer (HCC)     lung    Carotid artery stenosis     Dementia     HIGH CHOLESTEROL     treated with medication    Hypertension     Parkinson disease ([facility] [address]     Peripheral vascular disease ([facility]        Past Surgical History:   Procedure Laterality Date    BRONCHOSCOPY      CARDIAC CATHETERIZATION  04/04/2018    Left transfemoral right superficial femoral artery catheterization, right lower extremity runoff  Tammy Doyle MD   [address]      triple bypass    COLONOSCOPY  2011    Dr. Salinas Arriaga at [facility]    HIP FRACTURE SURGERY Right 09/06/2011    Right hip hemiarthroplasty RAJAT Manriquez MD       Family History   Problem Relation Age of Onset    Cancer Mother         [facility] [address] unconfirmed    Cancer Other         sister - skin       Unable to obtain family history due to altered mental status. Allergies   Allergen Reactions    Fexofenadine-Pseudoephed Er        Review of Systems:   See palliative care ROS/ESAS below; Detail ROS unable to obtain due to patient's mental status.     Social history:  Social History     Tobacco History     Smoking Status  Former Smoker Quit date  9/6/1985 Smoking Frequency  1.25 packs/day Smoking Tobacco Type  Cigarettes    Smokeless Tobacco Use  Never Used          Alcohol History     Alcohol Use Status  No Comment  remote Hx of moderate alcohol use, quit 1985          Drug Use     Drug Use Status  No          Sexual Activity     Sexually Active  Not Currently                Family

## 2019-07-05 NOTE — CARE COORDINATION
Per Rosalinda Nova from [employer], calvinert has been denied d/t no rehab potential. Insurance said patient is not appropriate for skilled, needs long term care or home with home health care. P2P option can take place with Dr. Shannon Monday at 12 noon on Monday 7/8 @ [phone]. Reference# [id number], or can appeal at [phone]. Met wit Dr. Addison Simon, he will not be doing the P2P d/t patient is not appropriate for skilled. I notified the patient's daughters, Gayla Ibrahim. They said that they both work and cannot take the patient home and cannot afford private pay caregivers either in home or in a facility. Corrinne Malling said they have started the [place] application process and would like him to go to a facility under Pending Medicaid. I reviewed the list of pending Medicaid facilities with them and they chose [facility]. Referral made to [facility]. [facility] said she will need a copy of the Medicaid application and a PASSR will need to be done. Danilo notified of need for copy of medicaid appilcation and Sw notified of need for PASRR. Await acceptance from [facility].   Gustabo Rahman RN CM

## 2019-07-05 NOTE — PROGRESS NOTES
per 24 hour   Intake 2558 ml   Output 1650 ml   Net 908 ml       Exam:    /74   Pulse 88   Temp 98.9 °F (37.2 °C) (Temporal)   Resp 16   Ht 5' 8\" (1.727 m)   Wt 131 lb 8 oz (59.6 kg)   SpO2 94%   BMI 19.99 kg/m²     General appearance: No apparent distress, appears stated age and cooperative. HEENT: Conjunctivae/corneas clear. Pupils equal and round. No injections noted. Minimal scabbing and moist oral mucosa noted. Aggressive mouth care ordered. Neck: Supple. No lesions, scars or masses. Trachea midline. Respiratory:  Normal respiratory effort. Clear to auscultation, bilaterally without Rales/Wheezes/Rhonchi. Cardiovascular: Regular rate and rhythm with normal S1/S2 without murmurs, rubs or gallops. Abdomen: Soft, non-tender, non-distended with normal bowel sounds. Musculoskeletal: No clubbing, cyanosis or edema bilaterally. Brisk capillary refill. 2+ lower extremity pulses (dorsalis pedis). Skin:  No rashes    Neurologic: awake, alert and following commands. Periods of confusion. Labs:   Recent Labs     07/03/19  0436 07/04/19  0509 07/05/19  0456   WBC 11.0 11.2 9.8   HGB 9.4* 9.3* 9.9*   HCT 29.8* 29.2* 30.9*    195 236     Recent Labs     07/03/19  0436 07/04/19  0509 07/05/19  0456    137 143   K 3.8 4.0 4.5   * 100 101   CO2 26 29 32*   BUN 43* 35* 32*   CREATININE 1.4* 1.2 1.2   CALCIUM 8.4* 8.7 8.8   PHOS  --   --  3.3     No results for input(s): AST, ALT, BILIDIR, BILITOT, ALKPHOS in the last 72 hours. No results for input(s): INR in the last 72 hours. No results for input(s): Gage  in the last 72 hours. Imaging:  CT HEAD WO CONTRAST   Final Result   Diffuse atrophy likely age related   Findings compatible with small vessel ischemic changes. 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             FL MODIFIED BARIUM SWALLOW W VIDEO   Final Result   Study is remarkable for laryngeal penetration and   aspiration with thin Status  · Consulted     · Disposition  · Likely [facility] at [facility] - Chief Hospitalist  Please contact me through perfect serve    NOTE: This report was transcribed using voice recognition software. Every effort was made to ensure accuracy; however, inadvertent computerized transcription errors may be present.

## 2019-07-05 NOTE — PROGRESS NOTES
Speech Language Pathology      NAME:  Willian Atkins. :  1933  DATE: 2019  ROOM:  [id number]     Contacted Danilo via phone regarding concerns regarding the swallow studies completed. Discussed the results of the swallow studies and recommendations. Discussed why he was recommended to be NPO and the rationale for implementation of ice chips and or small tsp sips of water. Discussed need for aggressive oral care to limit oral bacteria load. Discussed working with speech therapy to improve integrity of swallow function and repeating swallow study in 2-4 weeks dependent upon progress.  Daughter verbalized understanding and stated she had no further questions for speech therapy       Fever [R50.9]  Fever [R50.9]    Autumn Julissa MSCCC/SLP  Speech Language Pathologist  [id number]

## 2019-07-05 NOTE — PROGRESS NOTES
degrees, (bed locked at 30) with all lines and tubes intact and call light within reach. · Pt has made slow progress towards set goals. · Continue with current plan of care    Time in: 1:10pm  Time out: 1:48pm  Total Tx Time: [address] [place] [address], [address] [place] [address]

## 2019-07-06 NOTE — PROGRESS NOTES
Comments: Pt supine in bed upon entering room. Pt incontinent of bowel. Pt rolled to R and L while hygiene performed. Pt transferred to EOB requiring assist for BLE and trunk. Pt required cues for BUE placement while seated EOB. Pt performed STS and was incontinent of bowel. Pt stood statically EOB for approx 4 minutes focusing on posture while hygiene performed. Pt performed side stepping along EOB requiring assist for weight shifting and LLE advancement. Pt returned to supine and placed in chair position. Pt left in chair position with all needs met, bed alarm active and TSM present.         Time in: 0730  Time out: [facility] PTA [id number]

## 2019-07-06 NOTE — PROGRESS NOTES
Output 475 ml   Net 875 ml       Exam:    /77   Pulse 89   Temp 98.7 °F (37.1 °C) (Temporal)   Resp 18   Ht 5' 8\" (1.727 m)   Wt 131 lb 8 oz (59.6 kg)   SpO2 98%   BMI 19.99 kg/m²     General appearance: No apparent distress, appears stated age and cooperative. HEENT: Conjunctivae/corneas clear. Pupils equal and round. No injections noted. Minimal scabbing and moist oral mucosa noted. Aggressive mouth care ordered. Neck: Supple. No lesions, scars or masses. Trachea midline. Respiratory:  Normal respiratory effort. Clear to auscultation, bilaterally without Rales/Wheezes/Rhonchi. Cardiovascular: Regular rate and rhythm with normal S1/S2 without murmurs, rubs or gallops. Abdomen: Soft, non-tender, non-distended with normal bowel sounds. Musculoskeletal: No clubbing, cyanosis or edema bilaterally. Brisk capillary refill. 2+ lower extremity pulses (dorsalis pedis). Skin:  No rashes    Neurologic: awake, alert and following commands. Periods of confusion. Labs:   Recent Labs     07/04/19  0509 07/05/19 0456   WBC 11.2 9.8   HGB 9.3* 9.9*   HCT 29.2* 30.9*    236     Recent Labs     07/04/19  0509 07/05/19 0456 07/06/19  0513    143 136   K 4.0 4.5 4.3    101 98   CO2 29 32* 27   BUN 35* 32* 33*   CREATININE 1.2 1.2 1.2   CALCIUM 8.7 8.8 8.7   PHOS  --  3.3  --      No results for input(s): AST, ALT, BILIDIR, BILITOT, ALKPHOS in the last 72 hours. No results for input(s): INR in the last 72 hours. No results for input(s): Carmen Antonino in the last 72 hours. Imaging:  CT HEAD WO CONTRAST   Final Result   Diffuse atrophy likely age related   Findings compatible with small vessel ischemic changes. 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 FL MODIFIED BARIUM SWALLOW W VIDEO   Final Result   Study is remarkable for laryngeal penetration and   aspiration with thin consistency.       This procedure was performed by Jonh Bauer PA-C under the   indirect supervision of Varinder Pan MD.      The exam has been dictated and signed by Oni Mcfarland PA-C. Lennox Rung MD, Radiologist, have reviewed the images and   report and concur with these findings. Fluoroscopy modified barium swallow with video   Final Result   Study was remarkable for both oral and pharyngeal delay. There was evidence of retention within the vallecula. Patient   demonstrated persistent cough throughout the test but there was no   evidence of aspiration or laryngeal penetration seen in relation to   coughing. This procedure was performed by Krish Robison. Linda Garcia CNP under the   indirect supervision of  Varinder Pan MD.      The exam has been dictated and signed by Krish Garcia CNP. Lennox Rung MD, Radiologist, have reviewed the images and   report and concur with these findings. XR CHEST PORTABLE   Final Result      Redemonstration of opacities overlying right upper lobe and medial   left lower lobe which could suggest persistent pneumonia or areas of   atelectasis appearing similar since previous examination. Continued   follow-up could be helpful for further evaluation. CT ABDOMEN PELVIS WO CONTRAST Additional Contrast? None   Final Result   Large amount retained fecal matter concerning for constipation. Thickened urinary bladder concerning for cystitis. Distended gallbladder with the persistent common bile duct dilatation. CT CHEST WO CONTRAST   Final Result   Persistent nodular soft tissue density in the right perihilar region   with the strandy and patchy densities extending to the right upper   lobe and right middle lobe which may represent residual malignancy   with scarring and atelectasis. There may be some superimposed   pneumonic infiltrates in the right upper lobe.             XR CHEST PORTABLE   Final Result   Persistent slightly progressive patchy and nodular infiltrative   density

## 2019-07-06 NOTE — PLAN OF CARE
Problem: Falls - Risk of:  Goal: Will remain free from falls  Description  Will remain free from falls  7/6/2019 1057 by Jovanny Pack RN  Outcome: Met This Shift     Problem: Falls - Risk of:  Goal: Absence of physical injury  Description  Absence of physical injury  7/6/2019 1057 by Jovanny Pack RN  Outcome: Met This Shift     Problem: Risk for Impaired Skin Integrity  Goal: Tissue integrity - skin and mucous membranes  Description  Structural intactness and normal physiological function of skin and  mucous membranes.   7/6/2019 1057 by Jovanny Pack RN  Outcome: Met This Shift     Problem: Health Behavior:  Goal: Identification of resources available to assist in meeting health care needs will improve  Description  Identification of resources available to assist in meeting health care needs will improve  Outcome: Met This Shift     Problem: Pain:  Goal: Pain level will decrease  Description  Pain level will decrease  Outcome: Met This Shift     Problem: Pain:  Goal: Control of acute pain  Description  Control of acute pain  7/6/2019 1057 by Jovanny Pack RN  Outcome: Met This Shift

## 2019-07-06 NOTE — PROGRESS NOTES
Microbiology :  No results for input(s): BC in the last 72 hours. No results for input(s): Dorkatja Schulcriseldae in the last 72 hours. No results for input(s): LABURIN in the last 72 hours. No results for input(s): CULTRESP in the last 72 hours. No results for input(s): WNDABS in the last 72 hours. Radiology :  CT HEAD WO CONTRAST   Final Result   Diffuse atrophy likely age related   Findings compatible with small vessel ischemic changes. 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 FL MODIFIED BARIUM SWALLOW W VIDEO   Final Result   Study is remarkable for laryngeal penetration and   aspiration with thin consistency. This procedure was performed by Laura King PA-C under the   indirect supervision of Claudia Nathan MD.      The exam has been dictated and signed by Laura King PA-C. Fior Mancera MD, Radiologist, have reviewed the images and   report and concur with these findings. Fluoroscopy modified barium swallow with video   Final Result   Study was remarkable for both oral and pharyngeal delay. There was evidence of retention within the vallecula. Patient   demonstrated persistent cough throughout the test but there was no   evidence of aspiration or laryngeal penetration seen in relation to   coughing. This procedure was performed by Wendy Hutchison. Iraj Neumann CNP under the   indirect supervision of  Claudia Nathan MD.      The exam has been dictated and signed by Wendy Hutchison. Iraj Neumann CNP. Fior Mancera MD, Radiologist, have reviewed the images and   report and concur with these findings. XR CHEST PORTABLE   Final Result      Redemonstration of opacities overlying right upper lobe and medial   left lower lobe which could suggest persistent pneumonia or areas of   atelectasis appearing similar since previous examination. Continued   follow-up could be helpful for further evaluation.       CT ABDOMEN PELVIS WO CONTRAST Additional

## 2019-07-06 NOTE — PLAN OF CARE
Problem: Falls - Risk of:  Goal: Will remain free from falls  Description  Will remain free from falls  Outcome: Met This Shift     Problem: Risk for Impaired Skin Integrity  Goal: Tissue integrity - skin and mucous membranes  Description  Structural intactness and normal physiological function of skin and  mucous membranes.   7/6/2019 0004 by Ariel White RN  Outcome: Met This Shift  7/5/2019 1258 by Davi Mccoy RN  Outcome: Met This Shift     Problem: Pain:  Goal: Control of acute pain  Description  Control of acute pain  Outcome: Met This Shift

## 2019-07-07 NOTE — PROGRESS NOTES
7106  Gross per 24 hour   Intake 3102 ml   Output 575 ml   Net 2527 ml       Exam:    BP (!) 158/87   Pulse 82   Temp 97.7 °F (36.5 °C) (Temporal)   Resp 18   Ht 5' 8\" (1.727 m)   Wt 134 lb 9.6 oz (61.1 kg)   SpO2 97%   BMI 20.47 kg/m²     General appearance: No apparent distress, appears stated age and cooperative. HEENT: Conjunctivae/corneas clear. Pupils equal and round. No injections noted. Minimal scabbing and moist oral mucosa noted. Aggressive mouth care ordered. Neck: Supple. No lesions, scars or masses. Trachea midline. Respiratory:  Normal respiratory effort. Clear to auscultation, bilaterally without Rales/Wheezes/Rhonchi. Cardiovascular: Regular rate and rhythm with normal S1/S2 without murmurs, rubs or gallops. Abdomen: Soft, non-tender, non-distended with normal bowel sounds. Musculoskeletal: No clubbing, cyanosis or edema bilaterally. Brisk capillary refill. 2+ lower extremity pulses (dorsalis pedis). Skin:  No rashes    Neurologic: awake, alert and following commands. Periods of confusion. Labs:   Recent Labs     07/05/19  0456 07/07/19  0544   WBC 9.8 8.3   HGB 9.9* 8.7*   HCT 30.9* 27.8*    234     Recent Labs     07/05/19  0456 07/06/19  0513 07/07/19  0544    136 138   K 4.5 4.3 4.7    98 99   CO2 32* 27 29   BUN 32* 33* 32*   CREATININE 1.2 1.2 1.2   CALCIUM 8.8 8.7 8.6   PHOS 3.3  --   --      No results for input(s): AST, ALT, BILIDIR, BILITOT, ALKPHOS in the last 72 hours. No results for input(s): INR in the last 72 hours. No results for input(s): Gar Foster in the last 72 hours. Imaging:  CT HEAD WO CONTRAST   Final Result   Diffuse atrophy likely age related   Findings compatible with small vessel ischemic changes. 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 FL MODIFIED BARIUM SWALLOW W VIDEO   Final Result   Study is remarkable for laryngeal penetration and   aspiration with thin consistency.       This procedure progressive patchy and nodular infiltrative   density in the right suprahilar region and right upper lobe which may   represent persistent malignancy with  progressive superimposed   pneumonic infiltrates. Assessment/Plan:  Active Hospital Problems    Diagnosis Date Noted    Parkinson disease ([facility] [address] [G20]      Priority: High    Dementia due to Parkinson's disease without behavioral disturbance ([address] [G20, F02.80] 07/03/2019     Priority: Medium    Hypernatremia [E87.0] 06/29/2019     Priority: Medium    Severe protein-calorie malnutrition ([address] [E43] 06/28/2019     Priority: Medium    Lung cancer ([address] [C34.90] 09/27/2017     Priority: Medium    Goals of care, counseling/discussion [Z71.89]     Palliative care encounter [Z51.5]     Dysphagia [R13.10] 07/03/2019    Cancer ([facility] [address] [C80.1]     Pyelonephritis [N12] 06/29/2019    Hypertension, uncontrolled [I10] 06/29/2019    UTI (urinary tract infection) [N39.0] 06/24/2019    Fever [R50.9] 06/23/2019    PVD (peripheral vascular disease) with claudication ([address] [I73.9] 06/22/2015    HLD (hyperlipidemia) [E78.5] 09/05/2011    HTN (hypertension) [I10] 09/05/2011    PVD (peripheral vascular disease) ([address] [I73.9] 09/05/2011       Plan:  · Continue with oral care  · Check BMP tomorrow  · Nephrology following for hypernatremia  · Palliative care consulted regarding progressive Parkinsons disease with dementia. Explained to family poor prognosis at this point and possible change of code status as performing CPR or intubation is likely not going to improve quality of life. · CT head performed and showed no acute stroke or masses  · Awaiting precert to [facility] - initially denied, will not hear anything further until 7/8  · Able to DC at any time       Body mass index is 20.47 kg/m².     · DVT Prophylaxis  · Lovenox    · Diet  DIET TUBE FEED CONTINUOUS/CYCLIC NPO; STANDARD WITH FIBER; Gastrostomy; Continuous; 60    · Code Status  Full Code    · PT/OT Eval Status  · Consulted     · Disposition  · Likely HAILE at [facility] [address] [employer] - Chief Hospitalist  Please contact me through perfect serve    NOTE: This report was transcribed using voice recognition software. Every effort was made to ensure accuracy; however, inadvertent computerized transcription errors may be present.

## 2019-07-07 NOTE — PLAN OF CARE
Problem: Falls - Risk of:  Goal: Will remain free from falls  Description  Will remain free from falls  7/7/2019 0045 by Manolo Parks RN  Outcome: Met This Shift     Problem: Falls - Risk of:  Goal: Absence of physical injury  Description  Absence of physical injury  7/7/2019 0045 by Manolo Parks RN  Outcome: Met This Shift

## 2019-07-07 NOTE — PROGRESS NOTES
for constipation. Thickened urinary bladder concerning for cystitis. Distended gallbladder with the persistent common bile duct dilatation. CT CHEST WO CONTRAST   Final Result   Persistent nodular soft tissue density in the right perihilar region   with the strandy and patchy densities extending to the right upper   lobe and right middle lobe which may represent residual malignancy   with scarring and atelectasis. There may be some superimposed   pneumonic infiltrates in the right upper lobe. XR CHEST PORTABLE   Final Result   Persistent slightly progressive patchy and nodular infiltrative   density in the right suprahilar region and right upper lobe which may   represent persistent malignancy with  progressive superimposed   pneumonic infiltrates. Assessment     Pyelonephritis  Ecoli bactremia from above  CA lung with radiation therapy/keytruda     Plan  Leukocytosis improved   Dysuria improving - c/w pyridium   C/W Omnicef 300 mg BID through PEG -- Insurance complications holding back discharge     Electronically signed by MYESHA Ryan - NP on 7/7/2019 at 4:11 PM    Pt seen and examined. Above discussed agree with advanced practice nurse. Labs, cultures, and radiographs reviewed. Face to Face encounter occurred. Changes made as necessary.      Farhana Bell MD

## 2019-07-08 NOTE — PROGRESS NOTES
data filed at 7/8/2019 0644  Gross per 24 hour   Intake 2698 ml   Output --   Net 2698 ml       Exam:    BP (!) 151/75   Pulse 84   Temp 97.4 °F (36.3 °C) (Temporal)   Resp 18   Ht 5' 8\" (1.727 m)   Wt 134 lb 9.6 oz (61.1 kg)   SpO2 92%   BMI 20.47 kg/m²     General appearance: No apparent distress, appears stated age and cooperative. HEENT: Conjunctivae/corneas clear. Pupils equal and round. No injections noted. Minimal scabbing and moist oral mucosa noted. Aggressive mouth care ordered. Neck: Supple. No lesions, scars or masses. Trachea midline. Respiratory:  Normal respiratory effort. Clear to auscultation, bilaterally without Rales/Wheezes/Rhonchi. Cardiovascular: Regular rate and rhythm with normal S1/S2 without murmurs, rubs or gallops. Abdomen: Soft, non-tender, non-distended with normal bowel sounds. Musculoskeletal: No clubbing, cyanosis or edema bilaterally. Brisk capillary refill. 2+ lower extremity pulses (dorsalis pedis). Skin:  No rashes    Neurologic: awake, alert and following commands. Periods of confusion. Labs:   Recent Labs     07/07/19  0544 07/08/19  0446   WBC 8.3 8.1   HGB 8.7* 8.9*   HCT 27.8* 28.6*    243     Recent Labs     07/06/19  0513 07/07/19  0544 07/08/19  0447    138 135   K 4.3 4.7 4.6   CL 98 99 95*   CO2 27 29 31*   BUN 33* 32* 34*   CREATININE 1.2 1.2 1.3*   CALCIUM 8.7 8.6 8.7   PHOS  --   --  3.9     No results for input(s): AST, ALT, BILIDIR, BILITOT, ALKPHOS in the last 72 hours. No results for input(s): INR in the last 72 hours. No results for input(s): Jg Prier in the last 72 hours. Imaging:  CT HEAD WO CONTRAST   Final Result   Diffuse atrophy likely age related   Findings compatible with small vessel ischemic changes. 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 FL MODIFIED BARIUM SWALLOW W VIDEO   Final Result   Study is remarkable for laryngeal penetration and   aspiration with thin consistency. This procedure was performed by Ajay Lopez PA-C under the   indirect supervision of Gisela Dickey MD.      The exam has been dictated and signed by Ajay Lopez PA-C. Dar Vann MD, Radiologist, have reviewed the images and   report and concur with these findings. Fluoroscopy modified barium swallow with video   Final Result   Study was remarkable for both oral and pharyngeal delay. There was evidence of retention within the vallecula. Patient   demonstrated persistent cough throughout the test but there was no   evidence of aspiration or laryngeal penetration seen in relation to   coughing. This procedure was performed by Ozzy Cannon. Mayda Gan CNP under the   indirect supervision of  Gisela Dickey MD.      The exam has been dictated and signed by Ozzy aCnnon. Mayda Gan CNP. Dar Vann MD, Radiologist, have reviewed the images and   report and concur with these findings. XR CHEST PORTABLE   Final Result      Redemonstration of opacities overlying right upper lobe and medial   left lower lobe which could suggest persistent pneumonia or areas of   atelectasis appearing similar since previous examination. Continued   follow-up could be helpful for further evaluation. CT ABDOMEN PELVIS WO CONTRAST Additional Contrast? None   Final Result   Large amount retained fecal matter concerning for constipation. Thickened urinary bladder concerning for cystitis. Distended gallbladder with the persistent common bile duct dilatation. CT CHEST WO CONTRAST   Final Result   Persistent nodular soft tissue density in the right perihilar region   with the strandy and patchy densities extending to the right upper   lobe and right middle lobe which may represent residual malignancy   with scarring and atelectasis. There may be some superimposed   pneumonic infiltrates in the right upper lobe.             XR CHEST PORTABLE   Final Result   Persistent slightly progressive patchy and nodular infiltrative   density in the right suprahilar region and right upper lobe which may   represent persistent malignancy with  progressive superimposed   pneumonic infiltrates. Assessment/Plan:  Active Hospital Problems    Diagnosis Date Noted    Parkinson disease ([facility] [address] [G20]      Priority: High    Dementia due to Parkinson's disease without behavioral disturbance ([address] [G20, F02.80] 07/03/2019     Priority: Medium    Hypernatremia [E87.0] 06/29/2019     Priority: Medium    Severe protein-calorie malnutrition ([address] [E43] 06/28/2019     Priority: Medium    Lung cancer ([address] [C34.90] 09/27/2017     Priority: Medium    Goals of care, counseling/discussion [Z71.89]     Palliative care encounter [Z51.5]     Dysphagia [R13.10] 07/03/2019    Cancer ([address] [C80.1]     Pyelonephritis [N12] 06/29/2019    Hypertension, uncontrolled [I10] 06/29/2019    UTI (urinary tract infection) [N39.0] 06/24/2019    Fever [R50.9] 06/23/2019    PVD (peripheral vascular disease) with claudication ([address] [I73.9] 06/22/2015    HLD (hyperlipidemia) [E78.5] 09/05/2011    HTN (hypertension) [I10] 09/05/2011    PVD (peripheral vascular disease) ([address] [I73.9] 09/05/2011       Plan:  · Continue with oral care  · Check BMP tomorrow  · Nephrology following for hypernatremia  · Palliative care consulted regarding progressive Parkinsons disease with dementia. Explained to family poor prognosis at this point and possible change of code status as performing CPR or intubation is likely not going to improve quality of life. · CT head performed and showed no acute stroke or masses  · Awaiting precert to [facility] - initially denied, will not hear anything further until 7/8  · Able to DC at any time   · Wanting to leave - I explained that we will get things squared away for him      Body mass index is 20.47 kg/m².     · DVT Prophylaxis  · Lovenox    · Diet  DIET TUBE FEED CONTINUOUS/CYCLIC NPO; STANDARD WITH FIBER; Gastrostomy; Continuous; 60    · Code Status  Full Code    · PT/OT Eval Status  · Consulted     · Disposition  · Likely HAILE at [facility] [address] [employer] - Chief Hospitalist  Please contact me through perfect serve    NOTE: This report was transcribed using voice recognition software. Every effort was made to ensure accuracy; however, inadvertent computerized transcription errors may be present.

## 2019-07-08 NOTE — PROGRESS NOTES
Palliative Medicine  Progress Note    Chart reviewed. Patient has peg and awaiting discharge to nursing facility. Goals and code previously addressed, will sign off. Sybil King APRN-CNP  Palliative Medicine    Note: This report was completed using computerDramaFever voiced recognition software. Every effort has been made to ensure accuracy; however, inadvertent computerized transcription errors may be present.

## 2019-07-08 NOTE — DISCHARGE INSTR - COC
Continuity of Care Form    Patient Name: Antonino Templeton :  1933  MRN:  [id number]    Admit date:  2019  Discharge date:  19    Code Status Order: Full Code   Advance Directives:   [facility] Documentation     Date/Time Healthcare Directive Type of Healthcare Directive Copy in [facility] [address] Agent's Name Healthcare Agent's Phone Number    19 7610  Yes, patient has an advance directive for healthcare treatment  Durable power of  for health care  Yes, copy in chart  Adult [address]  [phone]    19 1417  Yes, patient has an advance directive for healthcare treatment  Durable power of  for health care;Living will  Yes, copy in chart  Adult Children Cheryle Richer  [phone]; [phone]          Admitting Physician:  Aida Butt DO  PCP: Selma Traylor MD    Discharging Nurse: [address] [place] [address] Unit/Room#: 4856/9735-Q  Discharging Unit Phone Number: [phone]    Emergency Contact:   Extended Emergency Contact Information  Primary Emergency Contact: Danilo Khalil  Address: [address] [place] [address] Candido Neal, [address] Phone: [phone]  Relation: Child  Secondary Emergency Contact: Kayli Khalil  Address: [address] [place], [place] [address]raul Yovanyxenia Marin, [address] Phone: [phone]  Relation: Child    Past Surgical History:  Past Surgical History:   Procedure Laterality Date    BRONCHOSCOPY      CARDIAC CATHETERIZATION  2018    Left transfemoral right superficial femoral artery catheterization, right lower extremity runoff  Kira Neri MD   [address]      triple bypass    COLONOSCOPY      Dr. Myah Roque at [facility]    HIP FRACTURE SURGERY Right 2011    Right hip hemiarthroplasty RAJAT Lorenzo MD       Immunization History:      There is no

## 2019-07-08 NOTE — PROGRESS NOTES
OT BEDSIDE TREATMENT NOTE      Date:2019  Patient Name: Barbi Joya. MRN: [id number]  : 1933  Room: [address] [place] [address]     Per [address] [place] [address] [id number]     AM-PAC Daily Activity Raw Score:   Recommended Adaptive Equipment: TBD      Diagnosis: Fever   Pt presented to ED on 19 for hypotension and fever    Pertinent Medical History: Afib, arthritis, lung CA, carotid artery stenosis, dementia, HTN, Parkinson disease, PVD     Precautions:  Falls, TSM, bed alarm, heel protectors, Parkinson's, dementia, incontinence, R air cast & shoes present (family requesting brace & shoes must be on for therapy) WBAT Peg Tube/NPO    Home Living: Pt lives with 2 daughters. BR set up: tub/shower with transfer bench  Equipment owned: ww, w/c, transfer bench, CPAP    Prior Level of Function:  19 daughter present reporting pt was Indep with ADL tasks prior to admission including bathing. Per daughter present (), pt was cooking his own meals. Pain: Denied any pain & no pain noted during treatment    Cognition: A&O: 1-2/4 (self/place given choices); Follows 1 step directions with ~50% accuracy. Pt requires mod-max verbal/visual cues for initiation and sequencing during LB ADLs. Memory:  poor (re-oriented pt to place and date with no recall              Sequencing:  poor              Problem solving:  poor              Judgement/safety:  poor    Functional Assessment:    Initial Eval Status  Date: 19 Treatment Status  Date:  19 Short Term Goals  Treatment frequency: PRN    Feeding NPO   NPO  Peg tube  -   Grooming Moderate Assist   Washed hands and face 2x Max A ()    NT this date Stand by Assist    UB Dressing Maximal Assist   gown Max A ()    NT this date Moderate Assist    LB Dressing Dependent  Max A/Dep to reach B LE's while seated EOB to onesimo depends; Max hand over hand assist to attempt to pull up while seated;  Max A+2 standing WW level to pull up over care    Time in: 1535  Time out: 1400  Total Tx Time: 25 min    Oneil Lynch, OTR/L [id number]

## 2019-07-08 NOTE — PROGRESS NOTES
Pt seen for dysphagia therapy. Oral care was provided prior to trial sips of water. Completed trial of water to promote functional integration of musculature targeted during dysphagia intervention. Pt tolerate small sips of water with no overt s/s of aspiration. Will continue.     [facility]  Speech Language Pathologist Student Intern

## 2019-07-08 NOTE — CARE COORDINATION
Per Perla Ortiz from [facility], they can accept the patient. Transportation has been set up via [facility] ambulance for 7 pm. Charge nurse, RN, liaison, and patient's daughter Erendira Jolley all notified. PAASR completed and ambulance form in envelope in soft chart. Await discharge order from Dr. Osmany Hameed.   Nani Jacobsen RN CM

## 2019-07-08 NOTE — PROGRESS NOTES
static standing to fatigue    Patient education  Pt was educated on sequencing with all mobility, posture, safety     Patient response to education:   Pt verbalized understanding Pt demonstrated skill Pt requires further education in this area   partial Partial with assist yes     Additional Comments: Pt supine in bed upon entering room. Pt incontinent of bowel and required assist of 2 and increased time for hygiene. Pt assisted with rolling and cued for technique. Pt unable to achieve full extension in standign despite cues and assistance. Pt unable to advance LEs this date despite attempts and cues. Pt positioned back in bed with HOB > 30 degrees, all needs intact         Time in: 1530  Time out: 1600    Pt is making  progress toward established Physical Therapy goals. Continue with physical therapy current plan of care.     John Martines DPT  License Number: [id number]

## 2019-07-08 NOTE — PROGRESS NOTES
Oral Daily    propranolol  20 mg Oral BID    vitamin D  2,000 Units Oral Daily       MEDS (infusions):      MEDS (prn):  glycerin moisturizing mouth spray, hydrALAZINE, LORazepam, barium sulfate, haloperidol lactate, hydrALAZINE, sodium chloride flush, acetaminophen    PHYSICAL EXAM:     Patient Vitals for the past 24 hrs:   BP Temp Temp src Pulse Resp SpO2 Weight   07/08/19 1215 -- -- -- -- -- -- 129 lb 12.8 oz (58.9 kg)   07/08/19 1000 (!) 154/67 98.3 °F (36.8 °C) Temporal 82 18 100 % --   07/07/19 2045 (!) 151/75 97.4 °F (36.3 °C) Temporal 84 18 92 % --   07/07/19 1530 129/60 98 °F (36.7 °C) Temporal 75 18 100 % --   @      Intake/Output Summary (Last 24 hours) at 7/8/2019 1436  Last data filed at 7/8/2019 0644  Gross per 24 hour   Intake 2698 ml   Output --   Net 2698 ml         Wt Readings from Last 3 Encounters:   07/08/19 129 lb 12.8 oz (58.9 kg)   06/10/19 131 lb 5 oz (59.6 kg)   06/08/19 133 lb (60.3 kg)       Constitutional:  Weak and frail. More awake today  Oral: mucus membranes very dry and cracked. Poor dentition  Neck: no JVD. Trachea in midline  Cardiovascular: S1, S2 regular rhythm, no murmur,or rub  Respiratory:  No crackles, no wheeze. CTAB. Poor inspiratory effort  Gastrointestinal:  Soft, nontender, nondistended, NABS  Ext: no edema, feet warm  Skin: dry, no rash  Neuro:  Awake      DATA:    Recent Labs     07/07/19  0544 07/08/19  0446   WBC 8.3 8.1   HGB 8.7* 8.9*   HCT 27.8* 28.6*   .6* 104.0*    243     Recent Labs     07/06/19  0513 07/07/19  0544 07/08/19  0447    138 135   K 4.3 4.7 4.6   CL 98 99 95*   CO2 27 29 31*   MG  --   --  2.2   PHOS  --   --  3.9   BUN 33* 32* 34*   CREATININE 1.2 1.2 1.3*       No results found for: LABPROT    ASSESSMENT:  -Hypernatremia- secondary to volume depletion.    -Chronic Kidney Disease- Stage III- baseline creatinine 1.7 mg/dl  -Hypertension  -Lung Cancer  -Urosepsis - E.  Coli          Overall Stable kidney function   Continue TF  Bmp in am   Will need weekly bmp on d/c         Electronically signed by Fabian Khalil MD on 7/8/2019 at 2:36 PM

## 2019-07-09 NOTE — DISCHARGE SUMMARY
retention within the vallecula. Patient   demonstrated persistent cough throughout the test but there was no   evidence of aspiration or laryngeal penetration seen in relation to   coughing. This procedure was performed by Cindy Lake. Viviana Pickens CNP under the   indirect supervision of  Savana Willson MD.      The exam has been dictated and signed by Cindy Lake. Viviana Pickens CNP. Moraima Spencer MD, Radiologist, have reviewed the images and   report and concur with these findings. XR CHEST PORTABLE   Final Result      Redemonstration of opacities overlying right upper lobe and medial   left lower lobe which could suggest persistent pneumonia or areas of   atelectasis appearing similar since previous examination. Continued   follow-up could be helpful for further evaluation. CT ABDOMEN PELVIS WO CONTRAST Additional Contrast? None   Final Result   Large amount retained fecal matter concerning for constipation. Thickened urinary bladder concerning for cystitis. Distended gallbladder with the persistent common bile duct dilatation. CT CHEST WO CONTRAST   Final Result   Persistent nodular soft tissue density in the right perihilar region   with the strandy and patchy densities extending to the right upper   lobe and right middle lobe which may represent residual malignancy   with scarring and atelectasis. There may be some superimposed   pneumonic infiltrates in the right upper lobe. XR CHEST PORTABLE   Final Result   Persistent slightly progressive patchy and nodular infiltrative   density in the right suprahilar region and right upper lobe which may   represent persistent malignancy with  progressive superimposed   pneumonic infiltrates.                   Discharge Medications:     Discharge Medication List as of 7/8/2019  6:42 PM           Details   phenazopyridine (PYRIDIUM) 200 MG tablet Take 1 tablet by mouth 3 times daily (with meals) for 3 days, Disp-9 tablet, R-0NO PRINT      pantoprazole (PROTONIX) 40 MG tablet Take 1 tablet by mouth every morning (before breakfast), Disp-30 tablet, R-0NO PRINT              Details   mirtazapine (REMERON) 7.5 MG tablet Take 1 tablet by mouth nightly, Disp-30 tablet, R-0NO PRINT              Details   aspirin 81 MG EC tablet Take 81 mg by mouth dailyHistorical Med      Mirabegron ER 25 MG TB24 Take 25 mg by mouth three times a week Takes M, W, F at nightHistorical Med      Dextromethorphan-guaiFENesin (ROBITUSSIN DM PO) Take 5 mLs by mouth every 4 hours as needed Historical Med      clopidogrel (PLAVIX) 75 MG tablet Take 75 mg by mouth dailyHistorical Med      gabapentin (NEURONTIN) 100 MG capsule Take 100 mg by mouth 2 times daily. Indications: will slowly increase to 300mg Historical Med      Memantine HCl (NAMENDA PO) Take 5 mg by mouth 2 times daily Historical Med      Donepezil HCl (ARICEPT PO) Take 10 mg by mouth daily Historical Med      propranolol (INDERAL) 20 MG tablet Take 20 mg by mouth 2 times dailyHistorical Med      CARBIDOPA PO Take  mg by mouth three times daily Historical Med      Vitamin D (CHOLECALCIFEROL) 1000 UNITS CAPS capsule Take 2,000 Units by mouth daily Historical Med      digoxin (LANOXIN) 0.25 MG tablet Take 250 mcg by mouth daily. pravastatin (PRAVACHOL) 40 MG tablet Take 40 mg by mouth daily. doxazosin (CARDURA) 1 MG tablet   Take 2 mg by mouth daily       lisinopril (PRINIVIL;ZESTRIL) 20 MG tablet Take 10 mg by mouth daily Historical Med      Multiple Vitamins-Minerals (TREVOR MULTIVITAMIN FOR MEN PO) Take  by mouth. Time Spent on discharge is more than 31 min in the examination, evaluation, counseling and review of medications and discharge plan. Signed:    Helen Manzanares DO   7/9/2019      Thank you Madison Amaral MD for the opportunity to be involved in this patient's care. If you have any questions or concerns please feel free to contact me.      NOTE: This report was

## 2019-07-15 PROBLEM — R00.1 BRADYCARDIA WITH 31-40 BEATS PER MINUTE: Status: ACTIVE | Noted: 2019-01-01

## 2019-07-15 PROBLEM — R00.1 BRADYCARDIA: Status: ACTIVE | Noted: 2019-01-01

## 2019-07-15 NOTE — ED PROVIDER NOTES
146 mmol/L    POC Potassium 7.8 (HH) 3.5 - 5.0 mmol/L    POC Chloride 95 (L) 100 - 108 mmol/L    POC Glucose 138 (H) 74 - 99 mg/dl    POC Creatinine 2.1 (H) 0.7 - 1.2 mg/dL    GFR Non-African American 30 >=60 mL/min/1.73    GFR  36     Performed on SEE BELOW    POCT Venous   Result Value Ref Range    POC Sodium 134 132 - 146 mmol/L    POC Potassium 5.2 (H) 3.5 - 5.0 mmol/L    POC Chloride 96 (L) 100 - 108 mmol/L    POC Glucose 153 (H) 74 - 99 mg/dl    POC Creatinine 2.1 (H) 0.7 - 1.2 mg/dL    GFR Non-African American 30 >=60 mL/min/1.73    GFR  36     Performed on SEE BELOW    POCT Glucose   Result Value Ref Range    Meter Glucose 117 (H) 74 - 99 mg/dL   EKG 12 Lead   Result Value Ref Range    Ventricular Rate 33 BPM    Atrial Rate 48 BPM    QRS Duration 104 ms    Q-T Interval 448 ms    QTc Calculation (Bazett) 331 ms    R Axis 15 degrees    T Axis 74 degrees       RADIOLOGY:  XR CHEST PORTABLE   Final Result   ALERT:  THIS IS AN ABNORMAL REPORT   1. Stable, enlarged right mediastinal silhouette with mild central   pulmonary vascular congestion. 2. Abnormal airspace disease right upper lobe, findings can be seen in   atelectasis/scarring or possible underlying malignancy. Further   evaluation with CT scan chest is recommended. 3. Vascular calcifications thoracic aorta. 4. Nonspecific opacifications in the left lung base could be   reflective of infiltrate/pneumonia/atelectasis. EKG: This EKG is signed and interpreted by me. Rate: 33  Rhythm: junctional rhythm  Interpretation: junctional  Comparison: changes compared to previous EKG      ------------------------- NURSING NOTES AND VITALS REVIEWED ---------------------------  Date / Time Roomed:  7/15/2019 12:58 PM  ED Bed Assignment:  7082/2243-D    The nursing notes within the ED encounter and vital signs as below have been reviewed.      Patient Vitals for the past 24 hrs:   BP Temp Temp src Pulse Resp SpO2 Height

## 2019-07-16 NOTE — DISCHARGE INSTR - COC
Continuity of Care Form    Patient Name: Jeff Lucero :  1933  MRN:  [id number]    Admit date:  7/15/2019  Discharge date:  ***    Code Status Order: Full Code   Advance Directives:   Advance Care Flowsheet Documentation     Date/Time Healthcare Directive Type of Healthcare Directive Copy in [address] Agent's Name Healthcare Agent's Phone Number    07/15/19 9882  Yes, patient has an advance directive for healthcare treatment  Durable power of  for health care;Living will  No, copy requested from family  Adult Children  [facility]  [phone]          Admitting Physician:  Jay Andrews MD  PCP: Jay Andrews MD    Discharging Nurse: [facility] Unit/Room#: 6501/6501-A  Discharging Unit Phone Number: ***    Emergency Contact:   Extended Emergency Contact Information  Primary Emergency Contact: Danilo Khalil  Address: [address] [place] [address], [address] [place] [address] Phone: [phone]  Relation: Child  Secondary Emergency Contact: Kayli Khalil  Address: [address] [place] [address], [address] [place] [address] Phone: [phone]  Relation: Child    Past Surgical History:  Past Surgical History:   Procedure Laterality Date    BRONCHOSCOPY      CARDIAC CATHETERIZATION  2018    Left transfemoral right superficial femoral artery catheterization, right lower extremity runoff  Marcy Jean MD   [address]      triple bypass    COLONOSCOPY      Dr. Do Lee at [facility]    HIP FRACTURE SURGERY Right 2011    Right hip hemiarthroplasty RAJAT Yen MD       Immunization History: There is no immunization history on file for this patient.     Active Problems:  Patient Active Problem List   Diagnosis Code    Fracture of hip, right, closed ([facility] [address] S72.001A    HTN (hypertension) I10    HLD (hyperlipidemia) E78.5    PVD

## 2019-07-16 NOTE — PROGRESS NOTES
Nutrition Assessment (Enteral Nutrition)    Type and Reason for Visit: Initial, Positive Nutrition Screen    Nutrition Recommendations:  Continue current EN. Nutrition Assessment: Pt severely malnourished on admit, subcutaneous fat/ muscle wasting, dysphagia with EN 2/2  catabolic illness (lung cancer/Parkinsons dx). Provide recommendations for EN. Malnutrition Assessment:  · Malnutrition Status: Meets the criteria for severe malnutrition  · Context: Chronic illness  · Findings of the 6 clinical characteristics of malnutrition (Minimum of 2 out of 6 clinical characteristics is required to make the diagnosis of moderate or severe Protein Calorie Malnutrition based on AND/ASPEN Guidelines):  1. Energy Intake-Greater than 75% of estimated energy requirement, Unable to assess    2. Weight Loss-2% loss or greater, in 3 months  3. Fat Loss-Severe subcutaneous fat loss, Orbital, Fat overlying the ribs  4. Muscle Loss-Severe muscle mass loss, Temples (temporalis muscle), Clavicles (pectoralis and deltoids)  5. Fluid Accumulation-No significant fluid accumulation,    6.   Strength-Not measured    Nutrition Risk Level: High    Nutrition Needs:  · Estimated Daily Total Kcal: 0930-6334(CBW   30-35 kcals/kg)  · Estimated Daily Protein (g): 80 - 95(CBW 1.5-1.8 g/kg)  · Estimated Daily Fluid (ml/day): 9464-0189(1 ml/kcal)    Nutrition Diagnosis:   · Problem: Severe malnutrition, In context of chronic illness  · Etiology: related to Catabolic illness     Signs and symptoms:  as evidenced by NPO status due to medical condition, Nutrition support - EN, Weight loss(4% weight loss x 3 months)    Objective Information:  · Nutrition-Focused Physical Findings: alert, confusion, weakness, abdomen WDL, + BS, PEG in place, dehydration, - I/O  · Wound Type: (abrasion)  · Current Nutrition Therapies:  · Oral Diet Orders: NPO   · Oral Diet intake: NPO  · Tube Feeding (TF) Orders:   · Feeding Route: Gastrostomy  · Formula: 1.5

## 2019-07-16 NOTE — CARE COORDINATION
SOCIAL WORK/CASEMANAGEMENT TRANSITION OF CARE PLANNING: pt is from [facility] where he was medicaid pending, iloc. The daughter, guillermina, doesn't want pt to return to facility and has agreed to go to [facility] instead. will get PT And OT ordered. The snf would like to see if they can skill pt. All discharge paperwork in place. Pt is In agreement.  Rozina Castro  7/16/2019

## 2019-07-16 NOTE — H&P
Drake Rutledge is a 80 y.o. male  with PHx of Lung Ca, HTN/HLD and CAD presents from nursing home with bradycardia. EMS gives primary report, note that bradycardia began after receiving beta-blockers at [facility] today (2 hrs ago). Record review indicates he is on propranolol and digoxin. Pt arrived A/Ox1, has a h/o dementia and parkinson, heart rate 30's . He reports no pain, SOB with these symptoms. Pt following all commands, Initial BP is 524 systolic, pt is full code. Seen by cardiology and admitted for treatment     Past Medical History:   Diagnosis Date    Arthritis     Atherosclerosis of native arteries of right leg with ulceration of other part of foot ([facility] [address] 3/28/2018    Cancer ([facility] [address]     lung    Carotid artery stenosis     Dementia     HIGH CHOLESTEROL     treated with medication    Hypertension     Parkinson disease ([facility] [address]     Peripheral vascular disease ([facility] [address]        Past Surgical History:   Procedure Laterality Date    BRONCHOSCOPY      CARDIAC CATHETERIZATION  04/04/2018    Left transfemoral right superficial femoral artery catheterization, right lower extremity runoff  Miryam Fowler MD   [address]      triple bypass    COLONOSCOPY  2011    Dr. Giancarlo Neely at [facility]      pringle    HIP FRACTURE SURGERY Right 09/06/2011    Right hip hemiarthroplasty RAJAT Campa MD       Family History   Problem Relation Age of Onset    Cancer Mother         [facility] [address] unconfirmed    Cancer Other         sister - skin       Prior to Admission medications    Medication Sig Start Date End Date Taking?  Authorizing Provider   acetaminophen (TYLENOL 8 HOUR ARTHRITIS PAIN) 650 MG extended release tablet Take 650 mg by mouth every 8 hours as needed for Pain   Yes Historical Provider, MD   carbidopa-levodopa (SINEMET)  MG per tablet Take 1 tablet by mouth 3 times daily   Yes Historical Provider, MD   mirtazapine (REMERON) 7.5 MG tablet Take 1 tablet by mouth nightly

## 2019-07-17 NOTE — PROGRESS NOTES
Physical Therapy    [address]  Initial Assessment    NAME: Analilia Zimmerman. : 1933  MRN: [id number]    Date of Service: 2019  Evaluating Therapist: Petra Mcpherson PT, DPT    Room #: 5103P  DIAGNOSIS: bradycardia   PRECAUTIONS: Falls, TSM, bed alarm, peg tube, mccoy  Pertinent Medical History: Arthritis, CA, Dementia, HTN, Parkinson's Disease, PVD  HPI: Patient presented to ED for bradycardia from [facility]  Per previous admission:  R air cast & shoes  (family requesting brace & shoes must be on for therapy) WBAT     Social:  Pt is a poor historian. Pt admitted from [facility] per chart. pt previously living with 2 daughters     PLOF: pt was independent using ww        Initial Evaluation  Date: 19 Treatment  Date: NA  Short Term/ Long Term   Goals   Was pt agreeable to Eval/treatment? yes     Did pt report pain? Pt denied pain      Bed Mobility  Rolling: max A  Supine to sit: max A   Sit to supine: max A   Scooting: max A   Mod A    Transfers Sit to stand: NT   Stand to sit: NT   Stand pivot: NT  Brace and shoes not present   Mod A   Ambulation    NT   TBD   Stair negotiation: ascended and descended NT  NT   AMPAC Raw Score 8/24       Alertness/Orientation: x2--very lethargic throughout majority of assessment, followed most 1-step commands   LE AROM: Grossly WFL   LE Strength: Grossly 3-/5   Static Balance: sitting EOB SBA<>min A   Dynamic Balance: sitting EOB min A  Endurance: limited   Sensation: denied numbness/tingling  Edema/Skin Integrity: no visible breakdown    Chair/Bed Alarm: armed      ASSESSMENT/TREATMENT  Pt displays functional ability as noted in the objective portion of this evaluation.       Pt performed the following therapeutic activities/exercise:   Sitting EOB x 11 minutes focusing on trunk control, posture, breathing  Ankle pumps x20 reps, laq x20 reps     Comments: Pt was supine upon PT arrival and agreeable to PT

## 2019-07-17 NOTE — PROGRESS NOTES
Occupational Therapy  OCCUPATIONAL THERAPY INITIAL EVALUATION      Date:2019  Patient Name: Chalino Mitchell. MRN: [id number]  : 1933  Room: [address] [place] [address]    Evaluating OT: Micaela Soto OTR/L #[id number]     AM-PAC Daily Activity Raw Score:     Recommended Adaptive Equipment:  TBD     Diagnosis: Bradycardia    Patient presented to ED for bradycardia from [facility]     Pertinent Medical History: Arthritis, CA, Dementia, HTN, Parkinson's Disease, PVD    Precautions:  Falls, TSM, bed alarm, peg tube, mccoy  Per previous admission:  R air cast & shoes  (family requesting brace & shoes must be on for therapy) WBAT     Home Living: Pt is a poor historian:  Pt admitted from [facility]; per hospital records: pt previously living with 2 daughters  Bathroom setup: tub/shower combo with bench  Equipment owned: w/w, w/w, tub bench, CPAP    Prior Level of Function: Independent with ADLs , Independent with IADLs; ambulated with w/w  Occupation: retired steel     Pain Level: Pt denies pain this session  Cognition: A&O: 2/4 (self and place); Follows 1 step directions   Memory:  P+   Sequencing:  P+   Problem solving:  P+   Judgement/safety:  P+     Functional Assessment:   Initial Eval Status  Date: 19 Treatment Status  Date: Short Term Goals  Treatment frequency: PRN   Feeding Peg tube      Grooming Minimal Assist   Stand by Assist    UB Dressing Minimal Assist   Stand by Assist    LB Dressing Dependent   Moderate Assist    Bathing Maximal Assist  Moderate Assist    Toileting Dependent   Moderate Assist    Bed Mobility  Supine to sit: Maximal Assist   Sit to supine: Maximal Assist   Supine to sit: Minimal Assist   Sit to supine: Minimal Assist    Functional Transfers Maximal Assist     Minimal Assist    Functional Mobility NT  (deferred due to air cast / shoes not being present)   Minimal Assist    Balance Sitting:     Static:  Min A     Dynamic:Min A  Standing:  Max A [x] IADLs [x] Safety Awareness [x]  Endurance [x]  Fine Motor Coordination [] Balance [x] Vision/perception [] Sensation []   Gross Motor Coordination [] ROM [] Delirium []                  Motor Control []    Plan of Care:   ADL retraining [x]   Equipment needs [x]   Neuromuscular re-education [x] Energy Conservation Techniques [x]  Functional Transfer training [x] Patient and/or Family Education [x]  Functional Mobility training [x]  Environmental Modifications [x]  Cognitive re-training []   Compensatory techniques for ADLs [x]  Splinting Needs []   Positioning to improve overall function [x]   Therapeutic Activity [x]  Therapeutic Exercise  [x]  Visual/Perceptual: []    Delirium prevention/treatment  []   Other:  []    Rehab Potential: Good for established goals     Patient / Family Goal:  Not stated     Patient and/or family were instructed diagnosis, prognosis/goals and plan of care. Demonstrated fair understanding. [] Malnutrition indicators have been identified and nursing has been notified to ensure a dietitian consult is ordered.        AM-PAC Daily Activity Inpatient   How much help for putting on and taking off regular lower body clothing?: Total  How much help for Bathing?: A Lot  How much help for Toileting?: Total  How much help for putting on and taking off regular upper body clothing?: A Little  How much help for taking care of personal grooming?: A Little  How much help for eating meals?: Total  AM-PAC Inpatient Daily Activity Raw Score: 11  AM-PAC Inpatient ADL T-Scale Score : 29.04  ADL Inpatient CMS 0-100% Score: 70.42  ADL Inpatient CMS G-Code Modifier : CL       mod Evaluation + 13 timed treatment minutes  Tx Time in: 912  Tx Time out: [facility] OTR/L #[id number]

## 2019-07-17 NOTE — PROGRESS NOTES
Regular rate and rhythm without murmur, gallop or rub. Abdomen:  Soft, non-tender. Extremities: Extremities warm to touch, pink, with no edema. Neuro/musculosketal:  Unremarkable. LABS:    Recent Labs     07/15/19  1330 07/15/19  1614 07/16/19  0603     --  140   CREATININE 1.9* 2.1* 1.7*       Recent Labs     07/15/19  1330 07/16/19  0603   HGB 8.6* 8.1*       No results for input(s): INR in the last 72 hours. IMPRESSION:    1.  Junctional bradycardia-now resolved after discontinuation of propranolol and digoxin. Would not restart any beta-blockers, clonidine, or rate slowing calcium channel blockers. Would also note that Aricept can also cause bradycardia at times. No current indication for pacemaker. 2.  Coronary disease-no indication of unstable angina and not currently candidate for stress testing given dementia and chronic kidney disease. 3.  Paroxysmal atrial fibrillation-no evidence of this on telemetry. No new antiarrhythmics added. 4.  Anticoagulation status-appears to be a poor candidate for anticoagulation but would continue aspirin and clopidogrel. 5.  Hypertension-blood pressure controlled. No new medication changes made. 6.  Hyperlipidemia- continue statin. 7.  No further inpatient cardiac recommendations pending unless high-grade AV block or profound bradycardia develops. Sign off.

## 2019-07-18 NOTE — DISCHARGE SUMMARY
Physician Discharge Summary     Patient ID:  Hailee Ghosh  [id number]  80 y.o.  8/29/1933    Admit date: 7/15/2019    Discharge date and time: 7/17/2019  4:43 PM     Admitting Physician: Cale Linder MD     Discharge Physician: Cale Linder MD.    Admission Diagnoses: Bradycardia [R00.1]  Bradycardia [R00.1]  Bradycardia [R00.1]  Bradycardia with 31-40 beats per minute [R00.1]    Discharge Diagnoses: same and  Junctional rhythm   CAD  MARCOS  Dehydration   Dementia  Lung CA    Discharged Condition: fair    Hospital Course: NH patient admitted with marked bradycardia dehydration seen by cardiology his dig and B blocker where held given IV fluid responded well was stable with no indication for pacer thus was transferred to [facility]     Consults: cardiology    Disposition: SNF    In process/preliminary results:  Outstanding Order Results     Date and Time Order Name Status Description    7/15/2019 1833 EKG 12 Lead Preliminary     7/15/2019 1614 POCT Venous Preliminary     7/15/2019 1328 POCT Venous Preliminary           Patient Instructions:   Discharge Medication List as of 7/17/2019  2:45 PM      CONTINUE these medications which have NOT CHANGED    Details   acetaminophen (TYLENOL 8 HOUR ARTHRITIS PAIN) 650 MG extended release tablet Take 650 mg by mouth every 8 hours as needed for PainHistorical Med      carbidopa-levodopa (SINEMET)  MG per tablet Take 1 tablet by mouth 3 times dailyHistorical Med      mirtazapine (REMERON) 7.5 MG tablet Take 1 tablet by mouth nightly, Disp-30 tablet, R-0NO PRINT      pantoprazole (PROTONIX) 40 MG tablet Take 1 tablet by mouth every morning (before breakfast), Disp-30 tablet, R-0NO PRINT      aspirin 81 MG EC tablet Take 81 mg by mouth dailyHistorical Med      clopidogrel (PLAVIX) 75 MG tablet Take 75 mg by mouth dailyHistorical Med      gabapentin (NEURONTIN) 100 MG capsule Take 100 mg by mouth 2 times daily.  Indications: will slowly increase to 300mg Historical Med

## 2019-07-24 PROBLEM — N39.0 UTI (URINARY TRACT INFECTION): Status: RESOLVED | Noted: 2019-01-01 | Resolved: 2019-01-01

## 2019-09-11 PROBLEM — A41.9 SEPTIC SHOCK (HCC): Status: ACTIVE | Noted: 2019-01-01

## 2019-09-11 PROBLEM — R65.21 SEPTIC SHOCK (HCC): Status: ACTIVE | Noted: 2019-01-01

## 2019-09-11 NOTE — ED PROVIDER NOTES
Dry gangrenous appearance to the second toe of the left foot. No purulence or bleeding. No crepitus or induration. Neurological: He is alert. He has normal strength. He is disoriented. He displays tremor. No sensory deficit. He exhibits normal muscle tone. Coordination normal.   Moves all 4 extremities. Answer simple questions. Skin: Skin is warm and dry. Sacral decubitus ulcer noted. Nursing note and vitals reviewed. Procedures     Central Line Placement Procedure Note    Indication: poor peripheral access, centrally administered medications and need for frequent blood draws    Consent: The daughter was counseled regarding the procedure, its indications, risks, potential complications and alternatives, and any questions were answered. Consent was obtained to proceed. Procedure: The patient was positioned appropriately and the skin over the right internal jugular vein was prepped with chlorhexidine and draped in a sterile fashion. Local anesthesia was obtained by infiltration using 1% Lidocaine without epinephrine. A large bore needle was used to identify the vein. A guide wire was then inserted into the vein through the needle. A triple lumen catheter was then inserted into the vessel over the guide wire using the Seldinger technique. All ports showed good, free flowing blood return and were flushed with saline solution. The catheter was then securely fastened to the skin with suture at 16 cm. Two sutures were placed into the kit included tube clamp and proximal eyelets. An antibiotic disk was placed and the site was then covered with a sterile dressing. A post procedure X-ray was ordered and showed good line position. The patient tolerated the procedure well.     Complications: None    Intubation Procedure Note    Indication: impending respiratory failure    Consent: The daughter was counseled regarding the procedure, its indications, risks, potential complications and alternatives, Not Detected  *  *  Normal Range: Not Detected      Film Array Chlamydophilia Pneumoniae       Result: Not Detected  *  *  Normal Range: Not Detected      Film Array Mycoplasma Pneumoniae       Result: Not Detected  *  *  Normal Range: Not Detected     CBC Auto Differential   Result Value Ref Range    WBC 17.4 (H) 4.5 - 11.5 E9/L    RBC 2.84 (L) 3.80 - 5.80 E12/L    Hemoglobin 9.1 (L) 12.5 - 16.5 g/dL    Hematocrit 29.4 (L) 37.0 - 54.0 %    .5 (H) 80.0 - 99.9 fL    MCH 32.0 26.0 - 35.0 pg    MCHC 31.0 (L) 32.0 - 34.5 %    RDW 15.1 (H) 11.5 - 15.0 fL    Platelets 361 136 - 505 E9/L    MPV 10.9 7.0 - 12.0 fL    Neutrophils % 93.9 (H) 43.0 - 80.0 %    Lymphocytes % 0.9 (L) 20.0 - 42.0 %    Monocytes % 3.5 2.0 - 12.0 %    Eosinophils % 1.7 0.0 - 6.0 %    Basophils % 0.2 0.0 - 2.0 %    Neutrophils Absolute 16.36 (H) 1.80 - 7.30 E9/L    Lymphocytes Absolute 0.17 (L) 1.50 - 4.00 E9/L    Monocytes Absolute 0.70 0.10 - 0.95 E9/L    Eosinophils Absolute 0.30 0.05 - 0.50 E9/L    Basophils Absolute 0.00 0.00 - 0.20 E9/L    Anisocytosis 1+     Polychromasia 1+     Hypochromia 1+     Poikilocytes 1+     Ovalocytes 1+     Tear Drop Cells 1+    Comprehensive Metabolic Panel   Result Value Ref Range    Sodium 131 (L) 132 - 146 mmol/L    Potassium 6.1 (H) 3.5 - 5.0 mmol/L    Chloride 92 (L) 98 - 107 mmol/L    CO2 26 22 - 29 mmol/L    Anion Gap 13 7 - 16 mmol/L    Glucose 126 (H) 74 - 99 mg/dL    BUN 91 (H) 8 - 23 mg/dL    CREATININE 2.6 (H) 0.7 - 1.2 mg/dL    GFR Non-African American 24 >=60 mL/min/1.73    GFR African American 28     Calcium 9.4 8.6 - 10.2 mg/dL    Total Protein 6.5 6.4 - 8.3 g/dL    Alb 2.8 (L) 3.5 - 5.2 g/dL    Total Bilirubin 0.3 0.0 - 1.2 mg/dL    Alkaline Phosphatase 255 (H) 40 - 129 U/L    ALT 7 0 - 40 U/L    AST 40 (H) 0 - 39 U/L   Lactate, Sepsis   Result Value Ref Range    Lactic Acid, Sepsis 3.9 (H) 0.5 - 1.9 mmol/L   Lactate, Sepsis   Result Value Ref Range    Lactic Acid, Sepsis 5.0 (HH) 0.5 - 1.9

## 2019-09-12 PROBLEM — E43 SEVERE PROTEIN-CALORIE MALNUTRITION (HCC): Chronic | Status: ACTIVE | Noted: 2019-01-01

## 2019-09-12 NOTE — CONSULTS
status discussion; family support and symptom management. Palliative Medicine Encounter:   Currently, Mr Cindy Ram is intubated and on vent. AC/VC; FIO2 50%; SPO2 78%. He is requiring pressors. SBP . Pt is responsive and following simple commands. LE still somewhat mottled. He denies pain. Has FCS in place with diarrhea. Continues on antibiotics. Met with daughter Rosenda Lobato at bedside. Explained role of PM; have seen pt and family in the past.  She is appreciative for support. Update provided. Discussed code- wants to continue full code for now. GOAL- continue current care; antibiotics; fluids. Hopes to wean. Past Medical History:   Diagnosis Date    Arthritis     Atherosclerosis of native arteries of right leg with ulceration of other part of foot ([facility] [address] 3/28/2018    Cancer (HCC)     lung    Carotid artery stenosis     Dementia     HIGH CHOLESTEROL     treated with medication    Hypertension     Parkinson disease ([facility] [address]     Peripheral vascular disease ([facility] [address]        Past Surgical History:   Procedure Laterality Date    BRONCHOSCOPY      CARDIAC CATHETERIZATION  04/04/2018    Left transfemoral right superficial femoral artery catheterization, right lower extremity runoff  Aixa Basurto MD   [address]      triple bypass    COLONOSCOPY  2011    [facility]. Andres Dupree at [facility]    HIP FRACTURE SURGERY Right 09/06/2011    Right hip hemiarthroplasty RAJAT Romano MD         Allergies:    Fexofenadine-pseudoephed er      Social history:  Social History     Socioeconomic History    Marital status:       Spouse name: None    Number of children: None    Years of education: None    Highest education level: None   Occupational History    None   Social Needs    Financial resource strain: None    Food insecurity:     Worry: None     Inability: None    Transportation needs:     Medical: None     Non-medical: None   Tobacco Use    Smoking status: - met with daughter Bianka Zapata at bedside; updated/ support offered      Shan Vasquez  MSN, APRN-CNS, [facility]  Palliative Medicine    Time in minutes: 70    More than 50% of this interaction was related to counseling and information given r/t disease process; plan of care; and code status. Discussed patient and the plan of care with the other IDT members of Palliative Care Team as well as with patient, family and staff nurse.      Thank you for allowing Palliative Medicine to participate in this patient's care.

## 2019-09-12 NOTE — CONSULTS
BILITOT 1.5 2019    BILIDIR <0.2 2019    IBILI see below 2019    LABALBU 2.2 2019    LABALBU 4.1 02/15/2012     Albumin:    Lab Results   Component Value Date    LABALBU 2.2 2019    LABALBU 4.1 02/15/2012     LDH:  No results found for: LDH  Uric Acid:  No results found for: LABURIC, URICACID  PT/INR:    Lab Results   Component Value Date    PROTIME 12.0 2017    PROTIME 19.6 10/10/2011    INR 1.1 2017     Last 3 Troponin:    Lab Results   Component Value Date    TROPONINI 0.21 2019    TROPONINI 0.02 2019    TROPONINI 0.02 07/15/2019       Imaging Studies:     Ct Abdomen Pelvis Wo Contrast    Result Date: 2019  Patient MRN:  [id number] : 1933 Age: 80 years Gender: Male Order Date:  2019 3:00 PM EXAM: CT ABDOMEN PELVIS WO CONTRAST COMPARISON: 2019 INDICATION:  sepsis, diarrhea  TECHNIQUE:  Low-dose CT acquisition technique included one of the following options; 1. Automated exposure control, 2. Adjustment of mA and/or kV according to the patient's size or 3. Use of iterative reconstruction. FINDINGS: There are multiple mildly distended loops of small bowel notable in the left abdomen containing fluid and gas which measure up to 2.8 cm. Moderate amount of stool seen throughout the colon. There is redemonstration of horseshoe kidney with multiple peripelvic cysts. No evidence of hydronephrosis. The appendix is not definitively visualized. There is limited visualization of the pelvis dated metallic artifact from right hip arthroplasty. No evidence of free air. Liver is unremarkable. Gallbladder is unremarkable. No evidence of acute pancreatitis. There is redemonstration of prominent diameter of common bile duct measuring up to 11 mm. Spleen is nonenlarged. View of the lung bases shows no evidence of pneumonia or pleural effusion. Gastric PEG tube is present with balloon tip located within the stomach.      1. Gas and fluid-filled mildly distended loops of small bowel notable in left abdomen could indicate adynamic ileus. Moderate amount of stool seen throughout the colon. 2. Limited visualization of the pelvis due to metallic artifact from right hip arthroplasty. 3. Redemonstration of horseshoe kidney with multiple bilateral parapelvic cysts. Xr Chest Portable    Result Date: 2019  Patient MRN:  [id number] : 1933 Age: 80 years Gender: Male Order Date:  2019 7:00 PM EXAM: XR CHEST PORTABLE one image INDICATION:  confirm ETT confirm ETT COMPARISON: 2019 FINDINGS: There is borderline cardiac size. There is persistent soft tissue prominence in the right hilum with  patchy and nodular infiltrative density in the right perihilar region. There is COPD. Right IJ central line is unchanged. Endotracheal tube is present with the tip 4.7 cm above the christopher. Stable abnormal chest with  persistent  soft tissue density and nodular density in the right perihilar region concerning for pneumonia and/or residual malignancy. Endotracheal tube approximately 4.7 cm above the christopher. Xr Chest Portable    Result Date: 2019  Patient MRN:  [id number] : 1933 Age: 80 years Gender: Male Order Date:  2019 5:45 PM EXAM: XR CHEST PORTABLE one image INDICATION:  IJ placement verification IJ placement verification COMPARISON: 2019 FINDINGS: There is borderline cardiac size. There is soft tissue prominence in right hilum with  nodular infiltrative density in the right perihilar region extending to right upper lobe. There is COPD. Right IJ central line is in place with the tip in the superior vena cava. There is no pneumothorax. Right IJ central line with the tip in the superior vena cava. There is no pneumothorax. Soft tissue prominence in the right hilum with  nodular infiltrative density in the right perihilar region which may due to pneumonia and/or residual malignancy.      Xr Chest Portable    Result ventilation, daily ABGs, wean as able   2. Continue levophed and vasopressin  3. Check Nicom--> fluid responsive, give 1 L of NS bolus then start 150 cc/hr NS    4. Start Vitamin C protocol   5. Send blood cx, urine cx, resend UA, check procal  6. Continue Vanc and Cefepime  7. Start oral vancomycin , check C.dif   8. Send urine electrolytes and urine osm/ crea  9. Recheck BMP, replace electrolytes as needed  10. Check cortisol level in the setting of hyponatremia, hyperkalemia, hypotension  11. Maintain IVF, Monitor UO and renal function   12. Trend LA q6   13. Start Oral vancomycin  For C.dif, check C. Dif antigen, contact isolation for now  14. Serial abdominal exam     PT/OT evaluation: pending   DVT prophylaxis/ GI prophylaxis: heparin/ protonix   Disposition: admit to MICU    I personally saw, examined and provided care for the patient. Radiographs, labs and medication list were reviewed by me independently. I spoke with bedside nursing, therapists and consultants. Critical care services and times documented are independent of procedures and multidisciplinary rounds with Residents. Additionally comprehensive, multidisciplinary rounds were conducted with the MICU team. The case was discussed in detail and plans for care were established. Review of Residents documentation was conducted and revisions were made as appropriate. I agree with the above documented exam, problem list and plan of care. C diff colitis and sepsis/septic shock   Dilated colon   ID consult  Already on Flagyl and Vancomycin ,PO  GS consult  On Vent   Continue fluid resuscitation ,pressors  discuss with family code status ,we will know this pm     Care reviewed with nursing staff, medical and surgical specialty care, primary care and the patient's family as available.      Chart review/lab review/X-ray viewing/documentation and had long Conversation with patient/family re: prognosis, care options and any end of life issues:      Critical

## 2019-09-13 NOTE — PROGRESS NOTES
non-tender. No guarding or rebound noted. Bowel sounds active. Extremities: Mild edema to both ankles and feet. Diffuse muscle wasting noted throughout. Neurologic: Unable to assess at this time. Patient is orally intubated and sedated. Active Problems:    Septic shock [facility])    Palliative care by specialist  Resolved Problems:    * No resolved hospital problems. *      Palliative Medicine Plan:  Plan is to continue current medical treatment in the medical intensive care unit. A limited code was established by the daughters. Goals are to eventually extubate the patient and get patient back to the nursing home, where he resides. Palliative medicine will continue to follow for goals of care CODE STATUS this patient condition changes. [facility] .  I have reviewed the documentation completed by the Resident   Please see my additional contributions to the HPI, physical exam, assessment, and/or medical decision making. I discussed pertinent history, exam findings, and treatment plan with the Resident in our morning IDT meetings. I directly participated in the medical-decision making, ordering tests, and/or medication adjustments as documented today. Electronically signed by Anil Davila DO on 9/13/19 at 11:36 AM .    ________________________________________________________________________________________       RESIDENT PROGRESS NOTE:    Chief Complaint: Myar is a 80 y.o. male with chief complaint of Respiratory Distress and C diff colitis. Assessment/Plan        Active Hospital Problems    Diagnosis Date Noted    Palliative care by specialist [Z51.5]     Septic shock ([facility] [address] [A41.9, R65.21] 09/11/2019       Palliative Care Goals or Care/Code Status:   - CODE Status: Patient is to remain limited code, no chest compressions, yes to defibrillation, yes to cardiac process of medications, yes to intubation. Patient. Electronically signed by Arabella Mcgregor DO on 9/13/2019 at 11:03 AM      Thank you for allowing Palliative Medicine to participate in the care of [facility]. .    Note: This report was completed using computerInveni voiced recognition software. Every effort has been made to ensure accuracy; however, inadvertent computerized transcription errors may be present.

## 2019-09-13 NOTE — FLOWSHEET NOTE
Wound Depth (cm) 0.1 cm   Wound Surface Area (cm^2) 0.16 cm^2   Change in Wound Size % (l*w) 84   Wound Volume (cm^3) 0.02 cm^3   Wound Assessment Red   Zakia-wound Assessment Purple   Red%Wound Bed 100   Wound 09/11/19 Heel Left; Outer   Date First Assessed/Time First Assessed: 09/11/19 2300   Present on Hospital Admission: Yes  Location: Heel  Wound Location Orientation: Left; Outer   Wound Image    Wound Deep tissue/Injury   Dressing/Treatment Open to air   Wound Length (cm) 0.8 cm   Wound Width (cm) 0.8 cm   Wound Depth (cm)   (obscured)   Wound Surface Area (cm^2) 0.64 cm^2   Change in Wound Size % (l*w) 20   Wound Assessment Purple   Drainage Amount None   Zakia-wound Assessment Blanchable erythema   Purple%Wound Bed 100     **Informed Consent**    The patient has given verbal consent to have photos taken and inserted into their chart as part of their permanent medical record for purposes of documentation, treatment management and/or medical review. All Images taken on 9/13/19 of patient name: Anali Cochran. were transmitted and stored on secured [employer] located within [email or web address] Tab by a registered [employer] Mobile Application Device. Impression:    Left heel DTI  Left 2nd toes arterial wounds    Plan:  Antifungal ointment to scrotum and buttocks  Bactroban to 2nd left toe  FMS in place and patent  Continue with preventive care.  Louie Hoang 9/13/2019 3:16 PM

## 2019-09-13 NOTE — PLAN OF CARE
Patient's daughters discussed father's care with team.    They would like to change code status to limited - no chest compressions, but we may continue intubation, give meds, and administer shock and resuscitative medications if needed.       Electronically signed by Rashmi Brown MD on 9/12/2019 at 8:14 PM

## 2019-09-13 NOTE — PROGRESS NOTES
[address] [facility]   Department of Internal Medicine   Internal Medicine Residency  MICU Progress Note    Patient:  Drake Lafleur. 80 y.o. male   MRN: [id number]       Date of Service: 9/13/2019    Allergy: Fexofenadine-pseudoephed er    Subjective   Overnight: Hb dropped to 6.8, received 1 unit PRBC    Pt seen, met lying in bed, remains sedated, intubated and mechanically ventilated. Remains on pressor support with Levophed at 14 mcg, Vaso at 0.03. Will continue to wean down. Currently on fentanyl as well. Post-transfusion H and H- 7.8  Cr down to 2.4, LA resolved  ID following, wbc down to 39.2. On Oral Vanc 500 mg q 6 and IV flagyl 500 mg q 8. Blood cultures now growing GPC in chains. Objective     Vitals:    09/13/19 0600 09/13/19 0620 09/13/19 0700 09/13/19 0745   BP: (!) 123/53   (!) 86/39   Pulse: 99 103 102 104   Resp: 28 24 22 22   Temp:    100.4 °F (38 °C)   TempSrc:    (P) Esophageal   SpO2: (!) 68%  (!) 76% (P) 98%   Weight:       Height:           Physical Exam:  · I & O - 24hr:No intake/output data recorded. Physical Exam   Constitutional: No distress. Cachectic appearing with some contractures   HENT:   Head: Normocephalic and atraumatic. Eyes: Pupils are equal, round, and reactive to light. Conjunctivae are normal. No scleral icterus. Cardiovascular: S1 normal, S2 normal and normal heart sounds. No murmur heard. Pulmonary/Chest: Effort normal and breath sounds normal. No respiratory distress. He has no wheezes. He has no rales. Abdominal: Soft. He exhibits no distension and no mass. There is tenderness. There is no guarding. Musculoskeletal: He exhibits no edema or tenderness. Neurological:   Intermittently responsive and moves all extremities spontaneously   Skin: Skin is warm and dry. He is not diaphoretic.          Medications     Continuous Infusions:   sodium chloride 150 mL/hr at 09/12/19 2331    dextrose      norepinephrine 12 mcg/min (09/13/19 0405)   Dc [place] fentaNYL 5 mcg/ml in 0.9%  ml infusion 50 mcg/hr (09/13/19 0124)    vasopressin (Septic Shock) infusion 0.03 Units/min (09/12/19 2210)     Scheduled Meds:   sodium chloride  250 mL Intravenous Once    ascorbic acid  1,500 mg Intravenous Q6H    sodium chloride flush  10 mL Intravenous 2 times per day    heparin (porcine)  5,000 Units Subcutaneous 3 times per day    pantoprazole  40 mg Intravenous Daily    And    sodium chloride (PF)  10 mL Intravenous Daily    metroNIDAZOLE  500 mg Intravenous Q8H    vancomycin  500 mg Per G Tube 4 times per day     PRN Meds: sodium chloride flush, magnesium hydroxide, glucose, dextrose, glucagon (rDNA), dextrose  Nutrition:       Labs and Imaging Studies     CBC:   Recent Labs     09/11/19  1315 09/12/19  0612 09/12/19  1600 09/13/19  0154 09/13/19  0500   WBC 17.4* 60.5*  --   --  39.2*   RBC 2.84* 2.11*  --   --  2.10*   HGB 9.1* 7.1* 7.5* 6.8* 6.8*   HCT 29.4* 22.4* 23.5* 21.7* 21.5*   .5* 106.2*  --   --  102.4*   MCH 32.0 33.6  --   --  32.4   MCHC 31.0* 31.7*  --   --  31.6*   RDW 15.1* 15.4*  --   --  15.5*    195  --   --  162   MPV 10.9 11.3  --   --  11.5       BMP:    Recent Labs     09/11/19  1315  09/12/19  0148 09/12/19  0612 09/12/19  1600 09/13/19  0154 09/13/19  0500   *  --  134 134 134 135  --    K 6.1*   < > 5.6* 5.1* 6.1* 5.6*  --    CL 92*  --  96* 100 101 101  --    CO2 26  --  14* 15* 17* 19*  --    BUN 91*  --  84* 85* 87* 81*  --    CREATININE 2.6*   < > 3.0* 2.9* 2.9* 2.7*  --    GLUCOSE 126*  --  100* 233* 154* 127*  --    CALCIUM 9.4  --  8.2* 7.9* 8.1* 7.8*  --    PROT 6.5  --  5.2*  --   --   --   --    LABALBU 2.8*  --  2.2* 2.1*  --   --  1.9*   BILITOT 0.3  --  1.5*  --   --   --   --    ALKPHOS 255*  --  745*  --   --   --   --    AST 40*  --  772*  --   --   --   --    ALT 7  --  109*  --   --   --   --     < > = values in this interval not displayed.        LIVER PROFILE:   Recent Labs     09/11/19  8861 09/12/19  0148 09/12/19  0612   AST 40* 772*  --    ALT 7 109*  --    BILIDIR  --   --  1.1*   BILITOT 0.3 1.5*  --    ALKPHOS 255* 745*  --        PT/INR:   No results for input(s): PROTIME, INR in the last 72 hours. APTT:   No results for input(s): APTT in the last 72 hours. Fasting Lipid Panel:    Lab Results   Component Value Date    CHOL 168 08/19/2015    TRIG 93 08/19/2015    HDL 64 08/19/2015       Cardiac Enzymes:    Lab Results   Component Value Date    CKTOTAL 516 (H) 09/12/2019    CKMB 6.0 09/12/2019    TROPONINI 0.21 (H) 09/12/2019    TROPONINI 0.02 09/11/2019    TROPONINI 0.02 07/15/2019       Notable Cultures:      Blood cultures   Blood Culture, Routine   Date Value Ref Range Status   09/11/2019 24 Hours- no growth  Preliminary     Respiratory cultures No results found for: RESPCULTURE No results found for: LABGRAM  Urine   Urine Culture, Routine   Date Value Ref Range Status   06/23/2019 >100,000 CFU/ml  Final     Legionella No results found for: LABLEGI  C Diff PCR No results found for: CDIFPCR  Wound culture/abscess: No results for input(s): WNDABS in the last 72 hours. Tip culture:No results for input(s): CXCATHTIP in the last 72 hours. Antibiotic  Days  Day started   Oral Vanc 9/12/19      IV Metronidazole   9/12/19                   Imaging Studies:  CT abdomen pelvis 9/11/19       1. Gas and fluid-filled mildly distended loops of small bowel notable   in left abdomen could indicate adynamic ileus. Moderate amount of   stool seen throughout the colon.       2. Limited visualization of the pelvis due to metallic artifact from   right hip arthroplasty.       3. 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         Resident's Assessment and PLan     Assessment:  1. AMS 2/2 sepsis, likely 2/2 fulminant cdiff colitis plus UTI vs Meningoencephalitis ( AMS+ fever)  2. Acute hypoxic respiratory failure 2/2 AMS, s/p intubation (day2)  3. Septic shock 2/2 C. plans for care were established. Review of Residents documentation was conducted and revisions were made as appropriate. I agree with the above documented exam, problem list and plan of care.     C yasir Ornelas  better/  Adjust IVF  Liberate from MV  Discuss with daughter  Wean pressors  ID following

## 2019-09-14 NOTE — PROGRESS NOTES
norepinephrine 14 mcg/min (09/13/19 1609)    fentaNYL 5 mcg/ml in 0.9%  ml infusion 125 mcg/hr (09/13/19 1450)     Scheduled Meds:   sodium chloride  250 mL Intravenous Once    mupirocin   Nasal BID    mupirocin   Topical Daily    miconazole nitrate   Topical BID    ascorbic acid  1,500 mg Intravenous Q6H    sodium chloride flush  10 mL Intravenous 2 times per day    heparin (porcine)  5,000 Units Subcutaneous 3 times per day    pantoprazole  40 mg Intravenous Daily    And    sodium chloride (PF)  10 mL Intravenous Daily    metroNIDAZOLE  500 mg Intravenous Q8H    vancomycin  500 mg Per G Tube 4 times per day     PRN Meds: sodium chloride flush, magnesium hydroxide, glucose, dextrose, glucagon (rDNA), dextrose  Nutrition:       Labs and Imaging Studies     CBC:   Recent Labs     09/11/19  1315 09/12/19  0612 09/13/19  0500 09/13/19  1030 09/13/19  1610   WBC 17.4* 60.5*  --  39.2*  --   --    RBC 2.84* 2.11*  --  2.10*  --   --    HGB 9.1* 7.1*   < > 6.8* 7.8* 8.1*   HCT 29.4* 22.4*   < > 21.5* 24.2* 25.4*   .5* 106.2*  --  102.4*  --   --    MCH 32.0 33.6  --  32.4  --   --    MCHC 31.0* 31.7*  --  31.6*  --   --    RDW 15.1* 15.4*  --  15.5*  --   --     195  --  162  --   --    MPV 10.9 11.3  --  11.5  --   --     < > = values in this interval not displayed.        BMP:    Recent Labs     09/11/19  1315  09/12/19  0148 09/12/19  0612  09/13/19  0154 09/13/19  0500 09/13/19  1030 09/13/19  1610   *  --  134 134   < > 135  --  139 139   K 6.1*   < > 5.6* 5.1*   < > 5.6*  --  5.0 4.7   CL 92*  --  96* 100   < > 101  --  108* 107   CO2 26  --  14* 15*   < > 19*  --  15* 18*   BUN 91*  --  84* 85*   < > 81*  --  76* 74*   CREATININE 2.6*   < > 3.0* 2.9*   < > 2.7*  --  2.4* 2.5*   GLUCOSE 126*  --  100* 233*   < > 127*  --  106* 79   CALCIUM 9.4  --  8.2* 7.9*   < > 7.8*  --  7.6* 7.7*   PROT 6.5  --  5.2*  --   --   --   --   --   --    LABALBU 2.8*  --  2.2* 2.1*  --   --  1.9* amount of   stool seen throughout the colon.       2. Limited visualization of the pelvis due to metallic artifact from   right hip arthroplasty.       3. 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         Resident's Assessment and PLan     Assessment:  1. AMS 2/2 sepsis, likely 2/2 fulminant cdiff colitis plus UTI vs Meningoencephalitis ( AMS+ fever)  2. Acute hypoxic respiratory failure 2/2 AMS, s/p intubation (day2)  3. Septic shock 2/2 C. Diff, UTI and GPC bacteremia. On levo and vaso, attempting to wean. Wbc 60.5-->39.2, procal 94.44, UA showing packed wbc, (+) LE, Nitrite+   4. Recurrent C. Diff- S/p oral vancomycin for 14 days at the [facility], Cdiff repeat positive with CT abdomen showing dilated bowel loops  5. Hx of PAF- rate controlled  6. MARCOS on CKD-improving: likely prerenal, hemodynamically mediated from diarrhea and hypovolemia (baseline crea 1.4-1.7), crea on admission 2.6-->2.4, FeNa  7. Hyperkalemia  2/2 ARF: 5.6-->6.1--->5.0 s/p calcium gluconate, dextrose and Insulin   8. HAGMA 2/2 LA vs uremia vs starvation ketosis(LA resolved, uremia improving)   9. Acute on chronic anemia, baseline 8-9: (7.1-->6.8--s/p 1 unit PRBC-->7. 8)     Plan:    1. Continue mechanical ventilation, daily ABGs, attempt weaning tomorrow am   2. Continue levophed and vasopressin, wean as able. 3. Continue IVF (D5NS at 100 cc/hr), start TF tomorrow if Hb remain stable in the next 24 hours   4. F/U GPC identification, daily CBC  5. Continue oral Vanc and IV metronidazole with cdiff precaution  6. Daily BMP, monitor renal function and urine output  7. Check cortisol level in the setting of hyponatremia, hyperkalemia, hypotension  8. Trend H and H Q 8, transfuse with goal >7, F/U FOBT  9. Serial abdominal exam and abdominal XRays       DVT/GI: PCD (Resume heparin if Hb remains stablein the next 24 hours) /Protonix    Disposition: MICU    Bradd Marcie M.D.     Internal Medicine Resident, PGY

## 2019-09-14 NOTE — PROGRESS NOTES
General Progress Note  9/14/2019 9:53 AM  Subjective:   Admit Date: 9/11/2019  PCP: Cale Linder MD  Interval History: covering for Dr. Anne Lopez. No acute issues overnight. Diet: Diet NPO Effective Now Exceptions are: Sips with Meds  Pain is:None  Nausea:None  Bowel Movement/Flatus yes    Data:   Scheduled Meds:   sodium chloride  250 mL Intravenous Once    mupirocin   Nasal BID    mupirocin   Topical Daily    miconazole nitrate   Topical BID    ascorbic acid  1,500 mg Intravenous Q6H    sodium chloride flush  10 mL Intravenous 2 times per day    heparin (porcine)  5,000 Units Subcutaneous 3 times per day    pantoprazole  40 mg Intravenous Daily    And    sodium chloride (PF)  10 mL Intravenous Daily    metroNIDAZOLE  500 mg Intravenous Q8H    vancomycin  500 mg Per G Tube 4 times per day     Continuous Infusions:   dextrose 5 % and 0.9 % NaCl 100 mL/hr at 09/14/19 0508    dextrose      norepinephrine 18 mcg/min (09/14/19 0620)    fentaNYL 5 mcg/ml in 0.9%  ml infusion 100 mcg/hr (09/14/19 0340)     PRN Meds:sodium chloride flush, magnesium hydroxide, glucose, dextrose, glucagon (rDNA), dextrose  I/O last 3 completed shifts: In: 0405 [I.V.:5850]  Out: 2127 [Urine:1967; Stool:160]  No intake/output data recorded.     Intake/Output Summary (Last 24 hours) at 9/14/2019 0953  Last data filed at 9/14/2019 0600  Gross per 24 hour   Intake 5850 ml   Output 1850 ml   Net 4000 ml       CBC with Differential:    Lab Results   Component Value Date    WBC 25.3 09/14/2019    RBC 2.78 09/14/2019    HGB 8.5 09/14/2019    HCT 26.6 09/14/2019     09/14/2019    .4 09/14/2019    MCH 32.0 09/14/2019    MCHC 31.9 09/14/2019    RDW 16.9 09/14/2019    SEGSPCT 71 11/15/2013    BLASTSPCT 2 09/09/2011    METASPCT 4.3 09/12/2019    LYMPHOPCT 0.9 09/14/2019    MONOPCT 1.7 09/14/2019    MYELOPCT 0.9 06/26/2019    BASOPCT 0.2 09/14/2019    MONOSABS 0.51 09/14/2019    LYMPHSABS 0.25 09/14/2019    EOSABS 0.00 09/14/2019    BASOSABS 0.00 09/14/2019     CMP:    Lab Results   Component Value Date     09/14/2019    K 4.2 09/14/2019     09/14/2019    CO2 15 09/14/2019    BUN 67 09/14/2019    CREATININE 2.0 09/14/2019    GFRAA 39 09/14/2019    LABGLOM 32 09/14/2019    GLUCOSE 115 09/14/2019    GLUCOSE 117 02/15/2012    PROT 5.2 09/12/2019    LABALBU 1.8 09/14/2019    LABALBU 4.1 02/15/2012    CALCIUM 8.0 09/14/2019    BILITOT 1.5 09/12/2019    ALKPHOS 745 09/12/2019     09/12/2019     09/12/2019     Magnesium:    Lab Results   Component Value Date    MG 2.3 07/16/2019     Phosphorus:    Lab Results   Component Value Date    PHOS 4.4 09/12/2019     PT/INR:    Lab Results   Component Value Date    PROTIME 12.0 02/11/2017    PROTIME 19.6 10/10/2011    INR 1.1 02/11/2017     Last 3 Troponin:    Lab Results   Component Value Date    TROPONINI 0.21 09/12/2019    TROPONINI 0.02 09/11/2019    TROPONINI 0.02 07/15/2019     U/A:    Lab Results   Component Value Date    COLORU Yellow 09/11/2019    PHUR 5.0 09/11/2019    WBCUA PACKED 09/11/2019    RBCUA 1-3 09/11/2019    BACTERIA MANY 09/11/2019    CLARITYU TURBID 09/11/2019    SPECGRAV 1.025 09/11/2019    LEUKOCYTESUR LARGE 09/11/2019    UROBILINOGEN 1.0 09/11/2019    BILIRUBINUR MODERATE 09/11/2019    BLOODU LARGE 09/11/2019    GLUCOSEU 100 09/11/2019     HgBA1c:  No components found for: HGBA1C  TSH:    Lab Results   Component Value Date    TSH 1.620 08/15/2014     -----------------------------------------------------------------    Objective:   Vitals: BP (!) 132/54   Pulse 99   Temp 98.1 °F (36.7 °C) (Esophageal)   Resp 21   Ht 5' 8\" (1.727 m)   Wt 144 lb 9 oz (65.6 kg)   SpO2 100%   BMI 21.98 kg/m²   General appearance: alert, appears stated age and cooperative  Skin: Skin color, texture, turgor normal. No rashes or lesions  HEENT: Head: Normal, normocephalic, atraumatic.   Neck: no adenopathy, no carotid bruit, no JVD, supple, symmetrical,

## 2019-09-14 NOTE — PROGRESS NOTES
infusion 100 mcg/hr (09/14/19 0340)     Scheduled Meds:   sodium chloride  250 mL Intravenous Once    mupirocin   Nasal BID    mupirocin   Topical Daily    miconazole nitrate   Topical BID    ascorbic acid  1,500 mg Intravenous Q6H    sodium chloride flush  10 mL Intravenous 2 times per day    heparin (porcine)  5,000 Units Subcutaneous 3 times per day    pantoprazole  40 mg Intravenous Daily    And    sodium chloride (PF)  10 mL Intravenous Daily    metroNIDAZOLE  500 mg Intravenous Q8H    vancomycin  500 mg Per G Tube 4 times per day     PRN Meds: sodium chloride flush, magnesium hydroxide, glucose, dextrose, glucagon (rDNA), dextrose  Nutrition:       Labs and Imaging Studies     CBC:   Recent Labs     09/12/19  0612  09/13/19  0500  09/14/19  0013 09/14/19 0528 09/14/19 0715   WBC 60.5*  --  39.2*  --   --  25.3*  --    RBC 2.11*  --  2.10*  --   --  2.78*  --    HGB 7.1*   < > 6.8*   < > 8.2* 8.9* 8.5*   HCT 22.4*   < > 21.5*   < > 24.8* 27.9* 26.6*   .2*  --  102.4*  --   --  100.4*  --    MCH 33.6  --  32.4  --   --  32.0  --    MCHC 31.7*  --  31.6*  --   --  31.9*  --    RDW 15.4*  --  15.5*  --   --  16.9*  --      --  162  --   --  145  --    MPV 11.3  --  11.5  --   --  11.3  --     < > = values in this interval not displayed.        BMP:    Recent Labs     09/11/19  1315  09/12/19  0148 09/12/19  0612  09/13/19  0500  09/13/19  1610 09/14/19  0013 09/14/19 0528 09/14/19 0715   *  --  134 134   < >  --    < > 139 142  --  146   K 6.1*   < > 5.6* 5.1*   < >  --    < > 4.7 4.3  --  4.2   CL 92*  --  96* 100   < >  --    < > 107 111*  --  114*   CO2 26  --  14* 15*   < >  --    < > 18* 17*  --  15*   BUN 91*  --  84* 85*   < >  --    < > 74* 70*  --  67*   CREATININE 2.6*   < > 3.0* 2.9*   < >  --    < > 2.5* 2.2*  --  2.0*   GLUCOSE 126*  --  100* 233*   < >  --    < > 79 105*  --  115*   CALCIUM 9.4  --  8.2* 7.9*   < >  --    < > 7.7* 7.6*  --  8.0*   PROT 6.5  --  5.2* 9/12/19                   Imaging Studies:  CT abdomen pelvis 9/11/19       1. Gas and fluid-filled mildly distended loops of small bowel notable   in left abdomen could indicate adynamic ileus. Moderate amount of   stool seen throughout the colon.       2. Limited visualization of the pelvis due to metallic artifact from   right hip arthroplasty.       3. 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         Resident's Assessment and PLan     Assessment:  1. AMS 2/2 sepsis, likely 2/2 fulminant cdiff colitis plus UTI vs Meningoencephalitis ( AMS+ fever)  2. Acute hypoxic respiratory failure 2/2 AMS, s/p intubation (day 3)  3. Septic shock 2/2 C. Diff, UTI and GPC bacteremia. · On Cristhian, attempting to wean. · Wbc 60.5-->39.2->25.3, procal 94.44,   · UA showing packed wbc, (+) LE, Nitrite+   · Urine cultures + for E Coli and Enterococcus. Blood culture 2/2 + for Enterococcus  4. Recurrent C. Diff- S/p oral vancomycin for 14 days at the [facility], Cdiff repeat positive with CT abdomen showing dilated bowel loops  5. A Fib RVR  · Hx of PAF  · S/p 5 mg cardizem and 250 mcg digoxin  6. MARCOS on CKD-improving: likely prerenal, hemodynamically mediated from diarrhea and hypovolemia (baseline crea 1.4-1.7), crea on admission 2.6-->2.4->1.9  7. Hyperkalemia  2/2 ARF, resolved: 5.6-->6.1--->5.0->4.2 s/p calcium gluconate, dextrose and Insulin   8. HAGMA 2/2 LA vs uremia vs starvation ketosis(LA resolved, uremia improving), resolved   9. Acute on chronic anemia, baseline 8-9: (7.1-->6.8--s/p 1 unit PRBC-->7.8->8. 5)     Plan:    1. Continue mechanical ventilation, daily ABGs, attempt weaning   2. Continue Cristhian, wean as able. 3. Continue IVF (D5NS at 100 cc/hr), start TF tomorrow if Hb remain stable in the next 24 hours  4. Continue Flagyl 500 mg IV q 8 H + Oral vancomycin 500 mg q 6 H   5. F/U culture sensitivity   6. C. diff precaution, F/U CBC   7.  Daily BMP, monitor renal function and urine

## 2019-09-14 NOTE — PROGRESS NOTES
failure   CARDIOVASCULAR:  Denies palpitation  GASTROINTESTINAL:  Diarrhea   GENITOURINARY:  Denies burning urination or frequency of urination  INTEGUMENT: no rash   HEMATOLOGIC/LYMPHATIC:  Denies lymph node swelling, gum bleeding or easy bruising. MUSCULOSKELETAL:  Denies leg pain , joint pain , joint swelling  NEUROLOGICAL:  Awake     PHYSICAL EXAM:      Vitals:     Vitals:    09/14/19 1600   BP:    Pulse: 117   Resp: 21   Temp: 98.2 °F (36.8 °C)   SpO2:        General Appearance:    Awake, alert ,intubated . Head:    Normocephalic, atraumatic   Eyes:    No pallor, no icterus,   Ears:    No obvious deformity or drainage.    Nose:   No nasal drainage   Throat:   Mucosa  dry   Neck:   Supple, no lymphadenopathy   Back:     no CVA tenderness   Lungs:     Clear to auscultation bilaterally, no wheeze    Heart:    Regular rate and rhythm,   Abdomen:     Soft, non-tender, bowel sounds present, mild distention  ,loose stool - FMS    Extremities:    No edema,non tender    Pulses:   Dorsalis pedis palpable    Skin:   no rashes or lesions       CBC with Differential:      Lab Results   Component Value Date    WBC 25.3 09/14/2019    RBC 2.78 09/14/2019    HGB 9.1 09/14/2019    HCT 28.7 09/14/2019     09/14/2019    .4 09/14/2019    MCH 32.0 09/14/2019    MCHC 31.9 09/14/2019    RDW 16.9 09/14/2019    SEGSPCT 71 11/15/2013    BLASTSPCT 2 09/09/2011    METASPCT 4.3 09/12/2019    LYMPHOPCT 0.9 09/14/2019    MONOPCT 1.7 09/14/2019    MYELOPCT 0.9 06/26/2019    BASOPCT 0.2 09/14/2019    MONOSABS 0.51 09/14/2019    LYMPHSABS 0.25 09/14/2019    EOSABS 0.00 09/14/2019    BASOSABS 0.00 09/14/2019       CMP     Lab Results   Component Value Date     09/14/2019    K 4.2 09/14/2019     09/14/2019    CO2 15 09/14/2019    BUN 67 09/14/2019    CREATININE 2.0 09/14/2019    GFRAA 39 09/14/2019    LABGLOM 32 09/14/2019    GLUCOSE 115 09/14/2019    GLUCOSE 117 02/15/2012    PROT 5.2 09/12/2019    LABALBU 1.8 Flagyl 500 mg IV q 8 hrs /Oral vancomycin 500 mg po q 6 hrs  2. Serial CBC with diff   3. Contact isolation   4. Bactroban nares   5.   Discussed with daughter at bedside

## 2019-09-14 NOTE — PROGRESS NOTES
DAILY VENTILATOR WEANING ASSESSMENT PERFORMED    P/FIO2 Ratio =  299        (<100= do not Wean)                  Cs =  51                        (<32= Instability)  Plat. Pressure = 14  MV =7.11  RSBI =    Instabilities:       Cardiovascular =       CNS =2       Respiratory = 4       Metabolic = 2 (27,143)    Parameters    no    Wean per protocol  no    Ask Physician for a weaning plan no    Additional Comments:     Performed by Bailey Mcdonald RRT      Reference Table:    Cardiovascular     CNS      1. Mean BP less than or equal to 75   1. Neuromuscular blockade  2. Heart Rate greater than 130   2. RASS of -3, -4, -5  3. Myocardial Ischemia    3. RASS of +3, +4  4. Mechanical Assist Device    4. ICP greater than 15 or             Intracranial Hypertension         Respiratory      Metabolic  1. PEEP equal to or greater than 10cm/H20  1. Temp. (8hrs) less than 95 or > 103  2. Respiratory Rate greater than 35   2. WBC < 5000 or > 66079  3. Minute Volume greater than 15L  4. pH less than 7.30  5.  Deteriorating chest X-ray

## 2019-09-14 NOTE — PROGRESS NOTES
General Surgery Attending Progress Note    Subjective: sedated, family at bed side, intubated  Objective:      Vitals: BP (!) 132/54   Pulse 135   Temp 97.9 °F (36.6 °C)   Resp 18   Ht 5' 8\" (1.727 m)   Wt 144 lb 9 oz (65.6 kg)   SpO2 97%   BMI 21.98 kg/m²     I/O:   I/O last 3 completed shifts: In: 4241 [I.V.:5850]  Out: 2127 [XHHRV:3745; Stool:160]    Gen: alert, oriented  Abdomen: soft, non-tender, non-distended, bowel sounds active with gastrostomy tube in place in left upper abdomen. Labs:   CBC:   Recent Labs     09/12/19  0612  09/13/19  0500  09/14/19  0013 09/14/19  0528 09/14/19  0715   WBC 60.5*  --  39.2*  --   --  25.3*  --    RBC 2.11*  --  2.10*  --   --  2.78*  --    HGB 7.1*   < > 6.8*   < > 8.2* 8.9* 8.5*    < > = values in this interval not displayed. CMP:    Recent Labs     09/11/19  1315  09/12/19  0148 09/12/19  0612  09/13/19  0500  09/13/19  1610 09/14/19  0013 09/14/19  0528 09/14/19  0715   *  --  134 134   < >  --    < > 139 142  --  146   K 6.1*   < > 5.6* 5.1*   < >  --    < > 4.7 4.3  --  4.2   CL 92*  --  96* 100   < >  --    < > 107 111*  --  114*   CO2 26  --  14* 15*   < >  --    < > 18* 17*  --  15*   BUN 91*  --  84* 85*   < >  --    < > 74* 70*  --  67*   CREATININE 2.6*   < > 3.0* 2.9*   < >  --    < > 2.5* 2.2*  --  2.0*   GLUCOSE 126*  --  100* 233*   < >  --    < > 79 105*  --  115*   LABALBU 2.8*  --  2.2* 2.1*  --  1.9*  --   --   --  1.8*  --    CALCIUM 9.4  --  8.2* 7.9*   < >  --    < > 7.7* 7.6*  --  8.0*   BILITOT 0.3  --  1.5*  --   --   --   --   --   --   --   --    ALKPHOS 255*  --  745*  --   --   --   --   --   --   --   --    AST 40*  --  772*  --   --   --   --   --   --   --   --    ALT 7  --  109*  --   --   --   --   --   --   --   --     < > = values in this interval not displayed. Radiology:ANIVAL reviewed. Assesment: 80 y.o. male with septic shock and C. Diff colitis    Plan:  · Resume tube feedings when medically stable.

## 2019-09-15 NOTE — PROGRESS NOTES
and alert   Skin: Skin is warm and dry. He is not diaphoretic. Medications     Continuous Infusions:   phenylephrine (MARY-SYNEPHRINE) 50mg/250mL infusion 175 mcg/min (09/14/19 2102)    dextrose      fentaNYL 5 mcg/ml in 0.9%  ml infusion 125 mcg/hr (09/14/19 1658)     Scheduled Meds:   sodium chloride  250 mL Intravenous Once    mupirocin   Nasal BID    mupirocin   Topical Daily    miconazole nitrate   Topical BID    ascorbic acid  1,500 mg Intravenous Q6H    sodium chloride flush  10 mL Intravenous 2 times per day    heparin (porcine)  5,000 Units Subcutaneous 3 times per day    pantoprazole  40 mg Intravenous Daily    And    sodium chloride (PF)  10 mL Intravenous Daily    metroNIDAZOLE  500 mg Intravenous Q8H    vancomycin  500 mg Per G Tube 4 times per day     PRN Meds: sodium chloride flush, magnesium hydroxide, glucose, dextrose, glucagon (rDNA), dextrose  Nutrition:       Labs and Imaging Studies     CBC:   Recent Labs     09/13/19  0500  09/14/19  0528  09/14/19  1615 09/15/19  0015 09/15/19  0512   WBC 39.2*  --  25.3*  --   --   --  27.6*   RBC 2.10*  --  2.78*  --   --   --  2.70*   HGB 6.8*   < > 8.9*   < > 9.1* 9.2* 8.5*   HCT 21.5*   < > 27.9*   < > 28.7* 29.1* 27.2*   .4*  --  100.4*  --   --   --  100.7*   MCH 32.4  --  32.0  --   --   --  31.5   MCHC 31.6*  --  31.9*  --   --   --  31.3*   RDW 15.5*  --  16.9*  --   --   --  17.0*     --  145  --   --   --  140   MPV 11.5  --  11.3  --   --   --  12.0    < > = values in this interval not displayed.        BMP:    Recent Labs     09/13/19  0500  09/14/19  0528 09/14/19  0715 09/14/19  1615 09/15/19  0015 09/15/19  0512   NA  --    < >  --  146 142 141  --    K  --    < >  --  4.2 4.2 4.3  --    CL  --    < >  --  114* 113* 113*  --    CO2  --    < >  --  15* 17* 15*  --    BUN  --    < >  --  67* 62* 65*  --    CREATININE  --    < >  --  2.0* 1.9* 1.9*  --    GLUCOSE  --    < >  --  115* 146* 166*  -- CALCIUM  --    < >  --  8.0* 7.9* 8.1*  --    LABALBU 1.9*  --  1.8*  --   --   --  1.5*    < > = values in this interval not displayed. LIVER PROFILE:   No results for input(s): AST, ALT, LIPASE, BILIDIR, BILITOT, ALKPHOS in the last 72 hours. Invalid input(s): AMYLASE,  ALB    PT/INR:   No results for input(s): PROTIME, INR in the last 72 hours. APTT:   No results for input(s): APTT in the last 72 hours. Fasting Lipid Panel:    Lab Results   Component Value Date    CHOL 168 08/19/2015    TRIG 93 08/19/2015    HDL 64 08/19/2015       Cardiac Enzymes:    Lab Results   Component Value Date    CKTOTAL 516 (H) 09/12/2019    CKMB 6.0 09/12/2019    TROPONINI 0.21 (H) 09/12/2019    TROPONINI 0.02 09/11/2019    TROPONINI 0.02 07/15/2019       Notable Cultures:      Blood cultures   Blood Culture, Routine   Date Value Ref Range Status   09/11/2019 (A)  Preliminary    24 Hours- no growth  Gram stain performed from blood culture bottle media  Gram positive cocci in chains     09/11/2019 Identification and sensitivity to follow  Preliminary     Respiratory cultures No results found for: RESPCULTURE No results found for: LABGRAM  Urine   Urine Culture, Routine   Date Value Ref Range Status   09/11/2019   Final    >100,000 CFU/ml  Refer to previous culture for susceptibility results  See CXURN collected 9/11/19 @13:00     09/11/2019   Final    >100,000 CFU/ml  Refer to previous culture for susceptibility results  See CXURN collected 9/11/19 @13:00       Legionella No results found for: LABLEGI  C Diff PCR No results found for: CDIFPCR  Wound culture/abscess: No results for input(s): WNDABS in the last 72 hours. Tip culture:No results for input(s): CXCATHTIP in the last 72 hours. Antibiotic  Days  Day started   Oral Vanc 9/12/19      IV Metronidazole   9/12/19                   Imaging Studies:  CT abdomen pelvis 9/11/19       1.  Gas and fluid-filled mildly distended loops of small bowel notable   in left

## 2019-09-15 NOTE — PROGRESS NOTES
MYELOPCT 0.9 06/26/2019    BASOPCT 0.1 09/15/2019    MONOSABS 0.28 09/15/2019    LYMPHSABS 0.83 09/15/2019    EOSABS 0.00 09/15/2019    BASOSABS 0.00 09/15/2019     CMP:    Lab Results   Component Value Date     09/15/2019    K 4.3 09/15/2019     09/15/2019    CO2 15 09/15/2019    BUN 65 09/15/2019    CREATININE 1.9 09/15/2019    GFRAA 41 09/15/2019    LABGLOM 34 09/15/2019    GLUCOSE 166 09/15/2019    GLUCOSE 117 02/15/2012    PROT 5.2 09/12/2019    LABALBU 1.5 09/15/2019    LABALBU 4.1 02/15/2012    CALCIUM 8.1 09/15/2019    BILITOT 1.5 09/12/2019    ALKPHOS 745 09/12/2019     09/12/2019     09/12/2019     Magnesium:    Lab Results   Component Value Date    MG 2.3 07/16/2019     Phosphorus:    Lab Results   Component Value Date    PHOS 4.4 09/12/2019     PT/INR:    Lab Results   Component Value Date    PROTIME 12.0 02/11/2017    PROTIME 19.6 10/10/2011    INR 1.1 02/11/2017     Last 3 Troponin:    Lab Results   Component Value Date    TROPONINI 0.21 09/12/2019    TROPONINI 0.02 09/11/2019    TROPONINI 0.02 07/15/2019     U/A:    Lab Results   Component Value Date    COLORU Yellow 09/11/2019    PHUR 5.0 09/11/2019    WBCUA PACKED 09/11/2019    RBCUA 1-3 09/11/2019    BACTERIA MANY 09/11/2019    CLARITYU TURBID 09/11/2019    SPECGRAV 1.025 09/11/2019    LEUKOCYTESUR LARGE 09/11/2019    UROBILINOGEN 1.0 09/11/2019    BILIRUBINUR MODERATE 09/11/2019    BLOODU LARGE 09/11/2019    GLUCOSEU 100 09/11/2019     HgBA1c:  No components found for: HGBA1C  TSH:    Lab Results   Component Value Date    TSH 1.620 08/15/2014     -----------------------------------------------------------------    Objective:   Vitals: /73   Pulse 132   Temp 100.4 °F (38 °C)   Resp 13   Ht 5' 8\" (1.727 m)   Wt 144 lb 9 oz (65.6 kg)   SpO2 91%   BMI 21.98 kg/m²   General appearance: alert, appears stated age and cooperative  Skin: Skin color, texture, turgor normal. No rashes or lesions  HEENT: Head: Normal,

## 2019-09-15 NOTE — PROGRESS NOTES
Department of Internal Medicine  Infectious Diseases  Progress  Note      C/C : Septic /Toxic shock sec to C diff infection     Discussed with the pt's daughter,   Pt is intubated , awake and less toxic today  Denies fever   Denies abdomen pain   Hypotensive on vasopressin and levophed    Fevers resolved however he still has a leukocytosis    No current facility-administered medications on file prior to encounter. Current Outpatient Medications on File Prior to Encounter   Medication Sig Dispense Refill    busPIRone (BUSPAR) 10 MG tablet Take 10 mg by mouth 2 times daily      lansoprazole (PREVACID) 30 MG delayed release capsule Take 30 mg by mouth daily      acetaminophen (TYLENOL 8 HOUR ARTHRITIS PAIN) 650 MG extended release tablet Take 650 mg by mouth every 8 hours as needed for Pain      carbidopa-levodopa (SINEMET)  MG per tablet Take 1 tablet by mouth 3 times daily      mirtazapine (REMERON) 7.5 MG tablet Take 1 tablet by mouth nightly 30 tablet 0    pantoprazole (PROTONIX) 40 MG tablet Take 1 tablet by mouth every morning (before breakfast) 30 tablet 0    aspirin 81 MG EC tablet Take 81 mg by mouth daily      clopidogrel (PLAVIX) 75 MG tablet Take 75 mg by mouth daily      gabapentin (NEURONTIN) 100 MG capsule Take 100 mg by mouth 2 times daily. Indications: will slowly increase to 300mg       Memantine HCl (NAMENDA PO) Take 5 mg by mouth 2 times daily       Donepezil HCl (ARICEPT PO) Take 10 mg by mouth daily       Vitamin D (CHOLECALCIFEROL) 1000 UNITS CAPS capsule Take 2,000 Units by mouth daily       pravastatin (PRAVACHOL) 40 MG tablet Take 40 mg by mouth daily.  doxazosin (CARDURA) 1 MG tablet   Take 2 mg by mouth daily       lisinopril (PRINIVIL;ZESTRIL) 20 MG tablet Take 10 mg by mouth daily       Multiple Vitamins-Minerals (TREVOR MULTIVITAMIN FOR MEN PO) Take  by mouth.          REVIEW OF SYSTEMS:      CONSTITUTIONAL:  fever  HEENT: denies blurring of vision or double extremity left  6. Enterococcus bacteremia associated with urinary tract as well as E. coli cystitis        RECOMMENDATIONS:      1. /Oral vancomycin 500 mg po q 6 hrs  2. Serial CBC with diff   3. Contact isolation   4. Bactroban nares   5. Discussed with daughter at bedside  6. Add ampicillin every 8 for the bacteremia and fosfomycin for the ESBL E. coli cystitis  7. Ultrasound of the abdomen rule out hydronephrosis  8.   Follow-up blood cultures

## 2019-09-15 NOTE — PROGRESS NOTES
17   Temp: 98.6 °F (37 °C)   SpO2:       TEMP:Current: Temp: 98.6 °F (37 °C)  Max: Temp  Av.9 °F (37.2 °C)  Min: 98.2 °F (36.8 °C)  Max: 100 °F (37.8 °C)    BP Range: Systolic (59[facility]), MIX:865 , Min:91 , BIM:836     Diastolic (45YDQ), ETL:33, Min:43, Max:64      General appearance: Sedated on fentanyl    appears stated age and cooperative  In no acute distress  Skin: No rashes or lesions  HEENT: mucous membranes are moist orally intubated  Neck: No JVD  Lungs: symmetrical expansion, Rales at right base to auscultation, no use of accessory muscles  Heart: S1S2 no murmurs,  Abdomen: soft, non tender,   Extremities: no peripheral edema left first 2 toes purple  Neurologic: Sedated   Affect: Calm      Assessment:   Patient Active Problem List:      68-year-old man with PMH of HTN, HLD, CAD s/p CABG, PAF, h/o lung CA s/p XRT, PD presents with change of mental status and fever. 2019 was treated for pyelonephritis E. coli bacteremia received 12 days of ceftriaxone. diagnosed with C. difficile was on oral vancomycin       CT abdomen pelvis showing gas fluid-filled distended small  bowel loops   chest x-ray clear  9/15 switch to Cristhian-Synephrine due to tachycardia. Chest x-ray with basilar atelectasis   Patient became hypotensive was placed on pressors and was intubated. 1. septic shock on Cristhian-Synephrine/vitamin C  2. Urosepsis with E. coli enterococcus with bacteremia   3. Severe C. difficile infection  4. Acute hypoxic respiratory failure on mechanical ventilation currently on AC 12/40% 10 of PEEP  5. Acute on chronic kidney injury with metabolic acidosis  6. Anemia  7. PAF  8. H/o HTN  9. HLD   10. CAD history of CABG  11. Squamous cell lung cancer s/p radiation right perihilar 2 x 2 right suprahilar and left lung adjacent to aorta  12. Second toe gangrene  13. Old lacunar CVA seen on CT head  14. History of dysphasia  15. DNR CCA  16.  Recent admission 7/15-7/17 2017 bradycardia due to digoxin and

## 2019-09-16 NOTE — PROGRESS NOTES
symptoms:  as evidenced by Moderate loss of subcutaneous fat, Severe muscle loss    Objective Information:  · Nutrition-Focused Physical Findings: Pt remains intubated, hypotension on pressor x2, +I/O's 13L, +1 generalized edema, active BS, diarrhea w/ noted Cdiff, PEG w/ TF      · Wound Type: Multiple, Deep Tissue Injury(gangrene toe wounds noted)     · Current Nutrition Therapies:  · Oral Diet Orders: NPO   · Tube Feeding (TF) Orders:   · Feeding Route: Gastrostomy  · Formula: Low Calorie, High Protein  · Rate (ml/hr):40 ml/hr     · Volume (ml/day): 960 ml tv   · Duration: Continuous  · Water Flushes: 30 ml q 4 hr = 180 ml water  · Current TF & Flush Orders Provides: 960 kcals, 84 gm pro, 803 ml free water, 983 ml total water w/ flushes   · Goal TF & Flush Orders Provides: at goal on pump      · Anthropometric Measures:  · Ht: 5' 8\" (172.7 cm)   · Current Body Wt: 145 lb (65.8 kg)(9/16 actual )  · Admission Body Wt: 134 lb (60.8 kg)(9/12 actual )  · Usual Body Wt: 132 lb (59.9 kg)(1/2019 actual per EMR; noted 7/2019 per EMR )  · Weight Change: CBW elevated since admit +I/O's 13L at this time.  Appears stable x 2 mon per EMR, unable to assess further hx/changes    · Ideal Body Wt: 154 lb (69.9 kg), % Ideal Body 87%(using adm wt )  · BMI Classification: (using adm wt 20.4)    Nutrition Interventions:   Continued Inpatient Monitoring, Education not appropriate at this time, Coordination of Care(Pt status & TF formula change d/w RN)    Nutrition Evaluation:   · Evaluation: Progressing toward goals   · Goals: Pt to tolerate TF at goal rate    · Monitoring: TF Intake, TF Tolerance, Skin Integrity, Wound Healing, I&O, Mental Status/Confusion, Monitor Hemodynamic Status, Weight, Monitor Bowel Function, Patient/Family Education      Electronically signed by Tyree Perez RD, LD on 9/16/19 at 1:45 PM    Contact Number: [phone]

## 2019-09-16 NOTE — PROGRESS NOTES
79 yo with known history of EVETTE, lung cancer admitted with c diff diarrhea, sepsis, dehydration and respiratory failure    CC/Overnight events:   Remains on full ventilator support (AC/VC) with RR 18 and FiO2 increased overnight from 40 to 50%. Also back on pressors this am. No indication of shortness of breath. He is sedated but able to get a barely perceptible nod when asked about breathing.     Current Facility-Administered Medications   Medication Dose Route Frequency Provider Last Rate Last Dose    norepinephrine (LEVOPHED) 16 mg in dextrose 5% 250 mL infusion  2 mcg/min Intravenous Continuous Schuyler Bai MD 1.9 mL/hr at 09/16/19 0730 2 mcg/min at 09/16/19 0730    ampicillin 2 g ivpb mini bag  2 g Intravenous 3 times per day Domitila Gee MD   Stopped at 09/16/19 0620    fosfomycin tromethamine (MONUROL) 3 g PACK 1 packet  3 g Oral Q3 Days Domitila Gee MD   1 packet at 09/15/19 1450    lactobacillus (CULTURELLE) capsule 1 capsule  1 capsule Oral Daily Domitila Gee MD   1 capsule at 09/16/19 0857    acetaminophen (TYLENOL) tablet 650 mg  650 mg Oral Q4H PRN Schuyler Bai MD   650 mg at 09/15/19 2152    diltiazem 100 mg in dextrose 5 % 100 mL infusion ([facility])  5 mg/hr Intravenous Continuous Schuyler Bai MD   Stopped at 09/16/19 0036    phenylephrine (MARY-SYNEPHRINE) 50 mg in dextrose 5 % 250 mL infusion  100 mcg/min Intravenous Continuous Tiara Huber MD 22.5 mL/hr at 09/16/19 0829 75 mcg/min at 09/16/19 0829    0.9 % sodium chloride bolus  250 mL Intravenous Once Mitchell Zuñiga MD        mupirocin (BACTROBAN) 2 % ointment   Nasal BID Ary Rowland MD        mupirocin Lisset Sniff) 2 % ointment   Topical Daily Jazmine Figueroa MD        miconazole nitrate 2 % ointment   Topical BID Jazmine Figueroa MD        sodium chloride flush 0.9 % injection 10 mL  10 mL Intravenous 2 times per day Rafael Villaseñor MD   10 mL at 09/16/19 0859    sodium chloride flush 0.9 %

## 2019-09-16 NOTE — PROGRESS NOTES
100 mcg/min (09/16/19 0530)    dextrose      fentaNYL 5 mcg/ml in 0.9%  ml infusion 75 mcg/hr (09/16/19 0622)       CBC with Differential:    Lab Results   Component Value Date    WBC 18.8 09/16/2019    RBC 2.65 09/16/2019    HGB 8.5 09/16/2019    HCT 26.1 09/16/2019     09/16/2019    MCV 98.5 09/16/2019    MCH 32.1 09/16/2019    MCHC 32.6 09/16/2019    RDW 16.8 09/16/2019    SEGSPCT 71 11/15/2013    BLASTSPCT 2 09/09/2011    METASPCT 4.3 09/12/2019    LYMPHOPCT 1.7 09/16/2019    MONOPCT 0.9 09/16/2019    MYELOPCT 0.9 06/26/2019    BASOPCT 0.2 09/16/2019    MONOSABS 0.19 09/16/2019    LYMPHSABS 0.38 09/16/2019    EOSABS 0.00 09/16/2019    BASOSABS 0.00 09/16/2019     CMP:    Lab Results   Component Value Date     09/16/2019    K 3.7 09/16/2019     09/16/2019    CO2 15 09/16/2019    BUN 65 09/16/2019    CREATININE 2.2 09/16/2019    GFRAA 35 09/16/2019    LABGLOM 29 09/16/2019    PROT 5.2 09/12/2019    LABALBU 1.8 09/16/2019    LABALBU 4.1 02/15/2012    CALCIUM 7.8 09/16/2019    BILITOT 1.5 09/12/2019    ALKPHOS 745 09/12/2019     09/12/2019     09/12/2019     PT/INR:    Lab Results   Component Value Date    PROTIME 12.0 02/11/2017    PROTIME 19.6 10/10/2011    INR 1.1 02/11/2017       Assessment:     Active Problems:    Septic shock ([facility] [address]    Palliative care by specialist    Acute respiratory failure with hypoxia (HCC)    MARCOS (acute kidney injury) ([facility] [address]    C. Diff colitis     Leukocytosis     Respiratory failure    Lung CA    Plan:   Continue same

## 2019-09-16 NOTE — CONSULTS
obtained from EMR and nursing staff. Past Medical History:   Diagnosis Date    Arthritis     Atherosclerosis of native arteries of right leg with ulceration of other part of foot ([facility] [address] 3/28/2018    Cancer (HCC)     lung    Carotid artery stenosis     Dementia     HIGH CHOLESTEROL     treated with medication    Hypertension     Parkinson disease ([facility] [address]     Peripheral vascular disease ([facility]          Past Surgical History:   Procedure Laterality Date    BRONCHOSCOPY      CARDIAC CATHETERIZATION  04/04/2018    Left transfemoral right superficial femoral artery catheterization, right lower extremity runoff  Mei Guzmán MD   [address]      triple bypass    COLONOSCOPY  2011    Dr. Tamara Patel at [facility]    HIP FRACTURE SURGERY Right 09/06/2011    Right hip hemiarthroplasty RAJAT Back MD       Medications Prior to Admission:    Medications Prior to Admission: busPIRone (BUSPAR) 10 MG tablet, Take 10 mg by mouth 2 times daily  lansoprazole (PREVACID) 30 MG delayed release capsule, Take 30 mg by mouth daily  acetaminophen (TYLENOL 8 HOUR ARTHRITIS PAIN) 650 MG extended release tablet, Take 650 mg by mouth every 8 hours as needed for Pain  carbidopa-levodopa (SINEMET)  MG per tablet, Take 1 tablet by mouth 3 times daily  mirtazapine (REMERON) 7.5 MG tablet, Take 1 tablet by mouth nightly  pantoprazole (PROTONIX) 40 MG tablet, Take 1 tablet by mouth every morning (before breakfast)  aspirin 81 MG EC tablet, Take 81 mg by mouth daily  clopidogrel (PLAVIX) 75 MG tablet, Take 75 mg by mouth daily  gabapentin (NEURONTIN) 100 MG capsule, Take 100 mg by mouth 2 times daily.  Indications: will slowly increase to 300mg   Memantine HCl (NAMENDA PO), Take 5 mg by mouth 2 times daily   Donepezil HCl (ARICEPT PO), Take 10 mg by mouth daily   Vitamin D (CHOLECALCIFEROL) 1000 UNITS CAPS capsule, Take 2,000 Units by mouth daily   pravastatin (PRAVACHOL) 40 MG There is mild to   moderate pelvocaliectasis. The bladder is collapsed around a Jiemnez catheter.           Impression   Mild to moderate left-sided pelvocaliectasis of   indeterminate etiology. Consider contrast enhanced CT or retrograde   pyelography for further evaluation.                            Order Date:  9/11/2019 3:00 PM       EXAM: CT ABDOMEN PELVIS WO CONTRAST       COMPARISON: June 23, 2019       INDICATION:  sepsis, diarrhea         TECHNIQUE:  Low-dose CT acquisition technique included one of the   following options; 1. Automated exposure control, 2. Adjustment of mA   and/or kV according to the patient's size or 3. Use of iterative   reconstruction.       FINDINGS:       There are multiple mildly distended loops of small bowel notable in   the left abdomen containing fluid and gas which measure up to 2.8 cm. Moderate amount of stool seen throughout the colon. There is   redemonstration of horseshoe kidney with multiple peripelvic cysts. No   evidence of hydronephrosis. The appendix is not definitively   visualized. There is limited visualization of the pelvis dated   metallic artifact from right hip arthroplasty. No evidence of free   air. Liver is unremarkable. Gallbladder is unremarkable. No evidence   of acute pancreatitis. There is redemonstration of prominent diameter   of common bile duct measuring up to 11 mm. Spleen is nonenlarged. View   of the lung bases shows no evidence of pneumonia or pleural effusion. Gastric PEG tube is present with balloon tip located within the   stomach.           Impression           1. Gas and fluid-filled mildly distended loops of small bowel notable   in left abdomen could indicate adynamic ileus. Moderate amount of   stool seen throughout the colon.       2. Limited visualization of the pelvis due to metallic artifact from   right hip arthroplasty.       3. 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 ASSESSMENT / PLAN:    Septic shock  Multi system organ failure   UTI   Bacteremia of urinary source   Bilateral parapelvic cysts   ? left sided pelvocaliectasis  Acute on CKD   Recurrent C- Diff  Leukocytosis with low grade fevers the past 24 hours    Per ID septic shock thought to be secondary to severe C-diff however he now has + blood cultures for enterococcus as well as UTI with enterococcus and E Coli. Ct did not  show evidence of obstructive uropathy from admission but it is difficult to assess distal ureters due to hardware reflection. There is  question of dilated left renal  calyces on renal us from 9/15. Leukocytosis is multifactorial with multiple infectious processes along with steroid therapy. His leukocytosis is  improving but overall picture is still poor. Renal function is stable. It is not clear if he has left ureteral obstruction however comparing CT to previous imaging there does not appear to be acute changes within the  system. Case was reviewed in detail with Dr JOHN Bales and it is felt risk of procedure would be greater than benefit at this point. We will continue to follow closely. If he stabilizes we will plan for renal nuclear scan. If ICU team feels he warrants a stent we will definitely reconsider our current plan. We will reevaluate in the morning. SHERIE Hennessy  6:38 PM  9/16/2019     The patient was seen and examined by me today. I agree with the assessment and plan of KENDY Hennessy CNP.

## 2019-09-16 NOTE — PROGRESS NOTES
Nicom    Pre-   BP- 117/67 (84)  HR- 110   CO- 4.7  TPR- 1331  CI- 2.7  TPRI- 2366  SV- 43.7  SVV- 19  SVI- 25    Post-  BP- 130/69 (89)  HR- 96  CO-4.1  TPR- 1533  CI- 2.3  TPRI- 2724  SV-39.3  SVV- 21  SVI- 22       4.8%

## 2019-09-16 NOTE — PROGRESS NOTES
09/12/2019    LABALBU 1.8 09/16/2019    LABALBU 4.1 02/15/2012    CALCIUM 7.8 09/16/2019    BILITOT 1.5 09/12/2019    ALKPHOS 745 09/12/2019     09/12/2019     09/12/2019          Microbiology :  No results for input(s): BC in the last 72 hours. No results for input(s): Giovanni Loup in the last 72 hours. No results for input(s): LABURIN in the last 72 hours. No results for input(s): CULTRESP in the last 72 hours. No results for input(s): WNDABS in the last 72 hours. Radiology :  XR ABDOMEN (KUB) (SINGLE AP VIEW)   Final Result   Nonspecific abdomen. XR CHEST PORTABLE   Final Result      Stable opacities bilaterally notable at left lung base suggestive of   pneumonia or atelectasis and probable left pleural effusion. Continued   follow-up could be helpful for further evaluation. US RETROPERITONEAL COMPLETE   Final Result   Mild to moderate left-sided pelvocaliectasis of   indeterminate etiology. Consider contrast enhanced CT or retrograde   pyelography for further evaluation. XR ABDOMEN (KUB) (SINGLE AP VIEW)   Final Result   Nonspecific bowel gas pattern. XR CHEST PORTABLE   Final Result   Bibasilar atelectasis. Life-saving devices are in stable   position as detailed above. XR CHEST PORTABLE   Final Result   Findings compatible with atherosclerotic disease    Density in both lung fields suggestive of scar, superimposed on   chronic lung disease                     XR ABDOMEN (KUB) (SINGLE AP VIEW)   Final Result   Nonspecific bowel gas pattern. XR ABDOMEN (KUB) (SINGLE AP VIEW)   Final Result   Nonspecific bowel gas pattern with  ileus. Colitis is difficult to   exclude. Consider CT scan.             XR CHEST PORTABLE   Final Result   No interval change               XR ABDOMEN (KUB) (SINGLE AP VIEW)   Final Result   No acute process               XR CHEST PORTABLE   Final Result   Stable abnormal chest with  persistent soft tissue prominence and   infiltrative nodular density in the right perihilar region without   significant change. XR Chest Abdomen Ng Placement   Final Result      Satisfactory placement of nasogastric tube. XR CHEST PORTABLE   Final Result   Stable abnormal chest with  persistent  soft tissue density and    nodular density in the right perihilar region concerning for pneumonia   and/or residual malignancy. Endotracheal tube approximately 4.7 cm above the christopher. XR CHEST PORTABLE   Final Result   Right IJ central line with the tip in the superior vena cava. There is   no pneumothorax. Soft tissue prominence in the right hilum with  nodular infiltrative   density in the right perihilar region which may due to pneumonia   and/or residual malignancy. CT ABDOMEN PELVIS WO CONTRAST   Final Result         1. Gas and fluid-filled mildly distended loops of small bowel notable   in left abdomen could indicate adynamic ileus. Moderate amount of   stool seen throughout the colon. 2. Limited visualization of the pelvis due to metallic artifact from   right hip arthroplasty. 3. 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 XR CHEST PORTABLE   Final Result   Findings compatible with atherosclerotic disease    No new acute infiltrate                     XR CHEST PORTABLE    (Results Pending)   XR CHEST PORTABLE    (Results Pending)   XR ABDOMEN (KUB) (SINGLE AP VIEW)    (Results Pending)         URINARY CATHETER OUTPUT (Jimenez):  Urethral Catheter Coude-Output (mL): 45 mL    DRAIN/TUBE OUTPUT:  [REMOVED] NG/OG/NJ/NE Tube Center mouth-Output (mL): 0 ml     IMPRESSION:      1. Toxic / Septic shock sec to severe C diff infection ( recurrent )  3. Leukocytosis   from septic shock trending down   4. MRSA nasal colonization   5.   Gangrene of the lower extremity second toe with exposed bone; atherosclerotic vascular disease lower

## 2019-09-17 NOTE — PROGRESS NOTES
[address] [facility]   Department of Internal Medicine   Internal Medicine Residency  MICU Progress Note    Patient:  Jose Ramon Jamison. 80 y.o. male   MRN: [id number]       Date of Service: 9/17/2019    Allergy: Fexofenadine-pseudoephed er     follow on septic shock     Subjective     Patient seen and examined this mornning. Patient currenlty intubated and sedated  -off sedation patient awake, alert and responsive,following command. Interval event/overnight event  - able wean off neosynephrine and levophed,currenlty on vitamin c protocol, intermittent labile BP  - ID following, wbc trending down to 18.7>8.9  - Urine cultures + for E Coli and Enterococcus. Blood culture 2/2 + for Enterococcus, afebrile over the last 24 hours. On Oral Vanc , ampicillin and fosfomycin  -TTE order to r/o infective endocarditis pending  - Bowel movement improving  - US kidney showed left-sided pelvocaliectasis , urology consulted>possible intervention as OP    Objective     Vitals:    09/17/19 1300 09/17/19 1317 09/17/19 1335 09/17/19 1400   BP:       Pulse: 94 106 121 117   Resp: 16 19 21 19   Temp:       TempSrc:       SpO2: 100% 98% 97% 97%   Weight:       Height:           Physical Exam:  · I & O - 24hr:No intake/output data recorded. Physical Exam   Constitutional: No distress. Cachectic appearing with some contractures   HENT:   Head: Normocephalic and atraumatic. Eyes: Pupils are equal, round, and reactive to light. Conjunctivae are normal. No scleral icterus. Cardiovascular: Normal rate, S1 normal, S2 normal and normal heart sounds. No murmur heard. Afib   Pulmonary/Chest: Effort normal and breath sounds normal. No respiratory distress. He has no wheezes. He has no rales. Abdominal: Soft. He exhibits no distension and no mass. There is no tenderness. There is no guarding. Musculoskeletal: He exhibits no edema or tenderness. Neurological: He is alert.    Moves all extremities spontaneously, awake and multidisciplinary rounds with Residents. Additionally comprehensive, multidisciplinary rounds were conducted with the MICU team. The case was discussed in detail and plans for care were established. Review of Residents documentation was conducted and revisions were made as appropriate. I agree with the above documented exam, problem list and plan of care. Julianne Ramirez M.D.    Pulmonary/Critical Care Medicine   37 min cct excluding procedures

## 2019-09-17 NOTE — PROGRESS NOTES
phenylephrine (MARY-SYNEPHRINE) 50mg/250mL infusion Stopped (09/16/19 0233)    dextrose      fentaNYL 5 mcg/ml in 0.9%  ml infusion 125 mcg/hr (09/17/19 0279)       CBC with Differential:    Lab Results   Component Value Date    WBC 8.9 09/17/2019    RBC 2.45 09/17/2019    HGB 7.9 09/17/2019    HCT 24.4 09/17/2019     09/17/2019    MCV 99.6 09/17/2019    MCH 32.2 09/17/2019    MCHC 32.4 09/17/2019    RDW 16.7 09/17/2019    SEGSPCT 71 11/15/2013    BLASTSPCT 2 09/09/2011    METASPCT 4.3 09/12/2019    LYMPHOPCT 3.5 09/17/2019    MONOPCT 4.3 09/17/2019    MYELOPCT 0.9 06/26/2019    BASOPCT 0.1 09/17/2019    MONOSABS 0.36 09/17/2019    LYMPHSABS 0.36 09/17/2019    EOSABS 0.00 09/17/2019    BASOSABS 0.00 09/17/2019     CMP:    Lab Results   Component Value Date     09/17/2019    K 3.6 09/17/2019     09/17/2019    CO2 18 09/17/2019    BUN 68 09/17/2019    CREATININE 2.1 09/17/2019    GFRAA 36 09/17/2019    LABGLOM 30 09/17/2019    PROT 5.2 09/12/2019    LABALBU 1.9 09/17/2019    LABALBU 4.1 02/15/2012    CALCIUM 7.9 09/17/2019    BILITOT 1.5 09/12/2019    ALKPHOS 745 09/12/2019     09/12/2019     09/12/2019     PT/INR:    Lab Results   Component Value Date    PROTIME 12.0 02/11/2017    PROTIME 19.6 10/10/2011    INR 1.1 02/11/2017       Assessment:     Active Problems:    Severe protein-calorie malnutrition (HCC)    Septic shock ([facility] [address]    Palliative care by specialist    Acute respiratory failure with hypoxia (HCC)    MARCOS (acute kidney injury) ([facility] [address]    C.  Diff colitis    Leukocytosis resolved       Plan:   Improvement ,continue same

## 2019-09-17 NOTE — PROGRESS NOTES
able to follow some commands; he continues on fentanyl. Clinically pt is improving. White count WNL but has thick tan resp secretions. Afebrile. Urology is following- no plans for further stenting at this time. He continues on IV antibiotics. ID is following. Daughters visited- were updated by ICU team;  requested pt not return to  [address] [place] [address]; referrals to other facilities. Limited code now- no CPR but ok for meds; shock and intubation. Spoke with staff nurse. GOAL- continue current care; antibiotics; fluids. Hopes to wean soon. Allergies:    Fexofenadine-pseudoephed er      Family history:  Family History   Problem Relation Age of Onset    Cancer Mother         [address] unconfirmed    Cancer Other         sister - skin       ----- REVIEW OF SYSTEMS -----  Unable to answer ROS questions    Vitals:   Vitals:    09/17/19 1200 09/17/19 1206 09/17/19 1253 09/17/19 1300   BP:       Pulse: 93 100 97 94   Resp: 19 18 17 16   Temp: 98.4 °F (36.9 °C)      TempSrc: Esophageal      SpO2: 100% 99% 98%    Weight:       Height:           Physical Exam:    Gen:   intubated/ on vent; following commands, in no acute distress  HEENT: Pupils equal and round; reactive to light  Neck: Supple  Lungs: CTA bilaterally; no audible rhonchi or wheezes noted; thick resp secretions; remains on vent  Heart: irregular; A-fib;  no murmur, rub, or gallop noted during exam  Abd: Soft, non tender, non distended, BS+; FCS in place with diarrhea; G tube in place;    Ext: Moving all extremities, no edema, pulses present; LLE with dressings on  Skin:  Warm and dry  Neuro: intubated/ on vent; on fentanyl/   following some commands; weak     Cultures:    Specimen: Blood Updated: 09/17/19 0802      Culture, Blood 2 Previously positive blood culture called Abnormal     Organism Enterococcus faecalisAbnormal     Culture, Blood 2 --    Refer to previous blood culture collected   9/11/19 @ 1300 for susceptibility results    Narrative:

## 2019-09-17 NOTE — PROGRESS NOTES
[facility] PROGRESS NOTE                                                                       2019        CHIEF UROLOGIC COMPLAINT: Hydronephrosis, horseshoe kidney    HISTORY OF PRESENT ILLNESS:  Patient intubated. In the intensive care unit. Daughter at his bedside. He is improved today per his nurse and off of pressor medications. Jimenez catheter is indwelling and draining clear, yellow urine. No hematuria. No bladder spasm. REVIEW OF SYSTEMS:   Unable to obtain due to patient being intubated    PAST FAMILY HISTORY:    Family History   Problem Relation Age of Onset    Cancer Mother         [address] unconfirmed    Cancer Other         sister - skin     PAST SOCIAL HISTORY:    Social History     Socioeconomic History    Marital status:       Spouse name: None    Number of children: None    Years of education: None    Highest education level: None   Occupational History    None   Social Needs    Financial resource strain: None    Food insecurity:     Worry: None     Inability: None    Transportation needs:     Medical: None     Non-medical: None   Tobacco Use    Smoking status: Former Smoker     Packs/day: 1.25     Types: Cigarettes     Last attempt to quit: 1985     Years since quittin.0    Smokeless tobacco: Never Used   Substance and Sexual Activity    Alcohol use: No     Comment: remote Hx of moderate alcohol use, quit     Drug use: No    Sexual activity: Not Currently   Lifestyle    Physical activity:     Days per week: None     Minutes per session: None    Stress: None   Relationships    Social connections:     Talks on phone: None     Gets together: None     Attends Yazidi service: None     Active member of club or organization: None     Attends meetings of clubs or organizations: None     Relationship status: None    Intimate partner

## 2019-09-17 NOTE — PROGRESS NOTES
ID Progress Note                [address], [place], [address] [facility]            Phone [phone]     Fax [phone]      Chief complaint   unchanged    Subjective:  Weaning  pressors  Remains intubated  Occasional low-grade fever  Clinically  improving        Objective:    Vitals:    09/17/19 0900   BP:    Pulse: 99   Resp: 17   Temp:    SpO2: 99%     VENT SETTINGS:   Vent Information  $Ventilation: $Subsequent Day  Ventilator Started: Yes  Equipment ID: ([id number]  Vent Type: 980  Vent Mode: AC/VC+  Vt Ordered: 400 mL  Rate Set: 16 bmp  Peak Flow: 0 L/min  Pressure Support: 0 cmH20  FiO2 : 45 %  Sensitivity: 3  PEEP/CPAP: 5  I Time/ I Time %: 0 s  Cuff Pressure (cm H2O): 29 cm H2O  Humidification Source: Heated wire  Humidification Temp: 37  Humidification Temp Measured: 37  General Appearance:   Intubated and sedated   HEENT:    Normocephalic,PERRL,neck supple, no JVD, mucosa moist, no thrush   Lungs:     Clear to auscultation bilaterally, no wheeze , crackles   Heart:    Regular rate and rhythm, no murmur   Abdomen:     Soft, non-tender, not distended  bowel sounds present,   Extremities:   No edema,no open wound,no erythema, non  tender   Skin:   no rashes or lesions   FMS system +    Labs:  Recent Labs     09/15/19  0512 09/16/19  0546 09/17/19  0415   WBC 27.6* 18.8* 8.9   RBC 2.70* 2.65* 2.45*   HGB 8.5* 8.5* 7.9*   HCT 27.2* 26.1* 24.4*   .7* 98.5 99.6   MCH 31.5 32.1 32.2   MCHC 31.3* 32.6 32.4   RDW 17.0* 16.8* 16.7*    119* 117*   MPV 12.0 11.2 12.1*     CMP:    Lab Results   Component Value Date     09/17/2019    K 3.6 09/17/2019     09/17/2019    CO2 18 09/17/2019    BUN 68 09/17/2019    CREATININE 2.1 09/17/2019    GFRAA 36 09/17/2019    LABGLOM 30 09/17/2019    GLUCOSE 247 09/17/2019    GLUCOSE 117 02/15/2012    PROT 5.2 09/12/2019    LABALBU 1.9 09/17/2019    LABALBU 4.1 02/15/2012    CALCIUM 7.9 09/17/2019    BILITOT 1.5 09/12/2019    ALKPHOS 745

## 2019-09-18 NOTE — PROGRESS NOTES
Spontaneous Parameters performed    VT = 700 ml  f = 17  B/M  Ve = 7.1 L/M  NIF = 37  cmH2O  VC = 760 ML  RSBI = 49      Performed by Darrell Allan

## 2019-09-18 NOTE — PLAN OF CARE
Problem: Restraint Use - Nonviolent/Non-Self-Destructive Behavior:  Goal: Absence of restraint-related injury  Description  Absence of restraint-related injury  9/18/2019 1500 by Rosalba Cheng RN  Outcome: Met This Shift     Problem: Restraint Use - Nonviolent/Non-Self-Destructive Behavior:  Goal: Absence of restraint indications  Description  Absence of restraint indications  Outcome: Not Met This Shift

## 2019-09-18 NOTE — SIGNIFICANT EVENT
Death Note    I was called at the bedside due to patient's limited breathing sounds after extubation. Upon arrival he was unresponsive to painful stimuli. He was breathing around 25-30s per minute on nasal cannula. We put him on AVAPS mode of ventilation as soon as possible and he was bagged until he was placed on AVAPS. His heart rate decreased down to 30s and we lost pulse. Telemetry showed asystole at 15.06. patient is noted to be DNR CCA. No chest compressions or resuscitative measures started. Death pronounced after patient was noted to be in asystole at 15.06 on 9/18/2019. NO pupillary, corneal, doll's eye or gag reflex noted. NO heart sounds or pulse noted. NO chest rise or lung sounds noted. NO response to painful stimuli. Family notified. Attending notified. Death pronounced at 15.06.      Sharonda Solorio MD PGY 3  Internal Medicine

## 2019-09-18 NOTE — PROGRESS NOTES
Pt seen. Remains intubated/sedated; FIO2 45%; responsive and reaches for lines/ETT. Follows commands when off sedation. Now off pressors. -156. No immediate needs at this time for Palliative medicine. No family present. Will follow along.

## 2019-09-18 NOTE — PROGRESS NOTES
fluid-filled distended small  bowel loops  9/11 chest x-ray clear  9/15 switch to Cristhian-Synephrine due to tachycardia.  Chest x-ray with basilar atelectasis. Patient became hypotensive was placed on pressors and was intubated. Ultrasound kidneys showing moderate left pelvicaliectasis 40% 10 of PEEP  9/17 45% +5 PEEP  9/18 off pressors      1. Acute hypoxic respiratory failure on mechanical ventilation  2. septic shock off pressors   3. Urosepsis with E. coli enterococcus with bacteremia continued positive questionable endocarditis  4. Severe C. difficile infection  5. Acute on chronic kidney injury with metabolic acidosis  6. 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 Second toe gangrene  8. Anemia  9. PAF  10. H/o HTN  11. HLD   12. CAD history of CABG  13. Squamous cell lung cancer s/p radiation right perihilar 2 x 2 right suprahilar and left lung adjacent to aorta  14. Old lacunar CVA seen on CT head  15. History of dysphasia  16. DNR CCA  17. Recent admission 7/15-7/17 2017 bradycardia due to digoxin and beta-blocker  Fracture of hip, right, closed ([address]     Carotid artery stenosis     PVD (peripheral vascular disease) with claudication (HCC)     General weakness     Dementia due to Parkinson's disease without behavioral disturbance ([address]     [id number]     Goals of care, counseling/discussion     Palliative care encounter        Plan:   1. HILTON planned  2. Wean FiO2 as tolerated  3.  Intubated l HILTON   4. follow hemoglobin    Raysa JacksonWenatchee Valley Medical CenterZOIE

## 2019-09-18 NOTE — PROGRESS NOTES
[facility] Quality Flow/Interdisciplinary Rounds Progress Note        Quality Flow Rounds held on September 18, 2019    Disciplines Attending:  Bedside Nurse,  and Physical Therapy    Ashleedali Jurado. was admitted on 9/11/2019 12:26 PM    Anticipated Discharge Date:  Expected Discharge Date: 09/18/19    Disposition:    Hugh Score:  Hugh Scale Score: 10    Readmission Risk              Risk of Unplanned Readmission:        46           Discussed patient goal for the day, patient clinical progression, and barriers to discharge. The following Goal(s) of the Day/Commitment(s) have been identified:  Wean vent and sedation, monitor BP.        Marialuisa Jones  September 18, 2019

## 2019-09-18 NOTE — PROGRESS NOTES
exhibits no edema or tenderness. Neurological: He is alert. Moves all extremities spontaneously, awake and alert   Skin: Skin is warm and dry. He is not diaphoretic.          Medications     Continuous Infusions:   dextrose      fentaNYL 5 mcg/ml in 0.9%  ml infusion 100 mcg/hr (09/17/19 5859)     Scheduled Meds:   insulin lispro  0-12 Units Subcutaneous TID WC    insulin lispro  0-6 Units Subcutaneous Nightly    ascorbic acid  1,500 mg Intravenous Q6H    thiamine (VITAMIN B1) IVPB  200 mg Intravenous BID    ampicillin IV  2 g Intravenous 3 times per day    fosfomycin tromethamine  3 g Oral Q3 Days    lactobacillus  1 capsule Oral Daily    sodium chloride  250 mL Intravenous Once    mupirocin   Nasal BID    mupirocin   Topical Daily    miconazole nitrate   Topical BID    sodium chloride flush  10 mL Intravenous 2 times per day    heparin (porcine)  5,000 Units Subcutaneous 3 times per day    pantoprazole  40 mg Intravenous Daily    And    sodium chloride (PF)  10 mL Intravenous Daily    vancomycin  500 mg Per G Tube 4 times per day     PRN Meds: perflutren lipid microspheres, acetaminophen, sodium chloride flush, magnesium hydroxide, glucose, dextrose, glucagon (rDNA), dextrose  Nutrition:       Labs and Imaging Studies     CBC:   Recent Labs     09/16/19  0546 09/17/19 0415 09/18/19  0335   WBC 18.8* 8.9 10.9   RBC 2.65* 2.45* 2.40*   HGB 8.5* 7.9* 7.5*   HCT 26.1* 24.4* 24.1*   MCV 98.5 99.6 100.4*   MCH 32.1 32.2 31.3   MCHC 32.6 32.4 31.1*   RDW 16.8* 16.7* 16.6*   * 117* 136   MPV 11.2 12.1* 12.1*       BMP:    Recent Labs     09/16/19  0546 09/17/19  0415 09/18/19  0335    143 145   K 3.7 3.6 3.7   * 112* 111*   CO2 15* 18* 18*   BUN 65* 68* 78*   CREATININE 2.2* 2.1* 2.3*   GLUCOSE 161* 247* 362*   CALCIUM 7.8* 7.9* 7.8*   LABALBU 1.8* 1.9* 1.9*       LIVER PROFILE:   No results for input(s): AST, ALT, LIPASE, BILIDIR, BILITOT, ALKPHOS in the last 72 hours.    Invalid input(s): AMYLASE,  ALB    PT/INR:   No results for input(s): PROTIME, INR in the last 72 hours. APTT:   No results for input(s): APTT in the last 72 hours. Fasting Lipid Panel:    Lab Results   Component Value Date    CHOL 168 08/19/2015    TRIG 93 08/19/2015    HDL 64 08/19/2015       Cardiac Enzymes:    Lab Results   Component Value Date    CKTOTAL 516 (H) 09/12/2019    CKMB 6.0 09/12/2019    TROPONINI 0.21 (H) 09/12/2019    TROPONINI 0.02 09/11/2019    TROPONINI 0.02 07/15/2019       Notable Cultures:      Blood cultures   Blood Culture, Routine   Date Value Ref Range Status   09/15/2019 24 Hours- no growth  Preliminary     Respiratory cultures No results found for: RESPCULTURE No results found for: LABGRAM  Urine   Urine Culture, Routine   Date Value Ref Range Status   09/11/2019   Final    >100,000 CFU/ml  Refer to previous culture for susceptibility results  See CXURN collected 9/11/19 @13:00     09/11/2019   Final    >100,000 CFU/ml  Refer to previous culture for susceptibility results  See CXURN collected 9/11/19 @13:00       Legionella No results found for: LABLEGI  C Diff PCR No results found for: CDIFPCR  Wound culture/abscess: No results for input(s): WNDABS in the last 72 hours. Tip culture:No results for input(s): CXCATHTIP in the last 72 hours. Antibiotic  Days  Day started   Oral Vanc 9/12/19      IV Metronidazole   9/12/19                   Imaging Studies:  CT abdomen pelvis 9/11/19       1. Gas and fluid-filled mildly distended loops of small bowel notable   in left abdomen could indicate adynamic ileus. Moderate amount of   stool seen throughout the colon.       2. Limited visualization of the pelvis due to metallic artifact from   right hip arthroplasty.       3. 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         Resident's Assessment and PLan     Assessment:  1.  Acute encephalopathy 2/2 sepsis, likely 2/2 severe c.diff colitis

## 2019-09-20 LAB — BLOOD CULTURE, ROUTINE: NORMAL

## 2019-09-23 LAB
EKG ATRIAL RATE: 136 BPM
EKG Q-T INTERVAL: 328 MS
EKG QRS DURATION: 86 MS
EKG QTC CALCULATION (BAZETT): 488 MS
EKG R AXIS: -31 DEGREES
EKG T AXIS: 79 DEGREES
EKG VENTRICULAR RATE: 133 BPM

## 2023-09-19 NOTE — PROGRESS NOTES
Microbiology :  No results for input(s): BC in the last 72 hours. No results for input(s): [place] Candace in the last 72 hours. No results for input(s): LABURIN in the last 72 hours. No results for input(s): CULTRESP in the last 72 hours. No results for input(s): WNDABS in the last 72 hours. Radiology :  FL MODIFIED BARIUM SWALLOW W VIDEO   Final Result   Study is remarkable for laryngeal penetration and   aspiration with thin consistency. This procedure was performed by Junaid Cam PA-C under the   indirect supervision of Rodney Rios MD.      The exam has been dictated and signed by Junaid Cam PA-C. Delma Bolton MD, Radiologist, have reviewed the images and   report and concur with these findings. Fluoroscopy modified barium swallow with video   Final Result   Study was remarkable for both oral and pharyngeal delay. There was evidence of retention within the vallecula. Patient   demonstrated persistent cough throughout the test but there was no   evidence of aspiration or laryngeal penetration seen in relation to   coughing. This procedure was performed by Ty High. Kate Lagunas CNP under the   indirect supervision of  Rodney Rios MD.      The exam has been dictated and signed by Ty High. Kate Lagunas CNP. Delma Bolton MD, Radiologist, have reviewed the images and   report and concur with these findings. XR CHEST PORTABLE   Final Result      Redemonstration of opacities overlying right upper lobe and medial   left lower lobe which could suggest persistent pneumonia or areas of   atelectasis appearing similar since previous examination. Continued   follow-up could be helpful for further evaluation. CT ABDOMEN PELVIS WO CONTRAST Additional Contrast? None   Final Result   Large amount retained fecal matter concerning for constipation. Thickened urinary bladder concerning for cystitis.       Distended gallbladder with the persistent common bile duct dilatation. CT CHEST WO CONTRAST   Final Result   Persistent nodular soft tissue density in the right perihilar region   with the strandy and patchy densities extending to the right upper   lobe and right middle lobe which may represent residual malignancy   with scarring and atelectasis. There may be some superimposed   pneumonic infiltrates in the right upper lobe. XR CHEST PORTABLE   Final Result   Persistent slightly progressive patchy and nodular infiltrative   density in the right suprahilar region and right upper lobe which may   represent persistent malignancy with  progressive superimposed   pneumonic infiltrates.                   Assessment and Plan:      Pyelonephritis  Ecoli bactremia from above  CA lung with radiation therapy/keytruda     Plan  Leukocytosis from volume contraction, hypernatremic, increase free fluid through PEG tube  Dysuria improving  ceftiraxone 2gm q daily day 8 of appropriate therapy  To p.o. antibiotics at discharge through PEG tube  And Pyridium for dysuria      Electronically signed by Chastity Cantu MD on 6/30/2019 at 2:56 PM Consent (Marginal Mandibular)/Introductory Paragraph: The rationale for Mohs was explained to the patient and consent was obtained. The risks, benefits and alternatives to therapy were discussed in detail. Specifically, the risks of damage to the marginal mandibular branch of the facial nerve, infection, scarring, bleeding, prolonged wound healing, incomplete removal, allergy to anesthesia, and recurrence were addressed. Prior to the procedure, the treatment site was clearly identified and confirmed by the patient. All components of Universal Protocol/PAUSE Rule completed.

## (undated) DEVICE — GAUZE,SPONGE,POST-OP,4X3,STRL,LF: Brand: MEDLINE

## (undated) DEVICE — BINDER ABD SM MED 30 IN - 45 IN HT 12 IN

## (undated) DEVICE — CANNULA NSL ORAL AD FOR CAPNOFLEX CO2 O2 AIRLFE

## (undated) DEVICE — DEFENDO AIR WATER SUCTION AND BIOPSY VALVE KIT FOR  OLYMPUS: Brand: DEFENDO AIR/WATER/SUCTION AND BIOPSY VALVE

## (undated) DEVICE — BLOCK BITE 60FR RUBBER ADLT DENTAL

## (undated) DEVICE — Device